# Patient Record
Sex: FEMALE | Race: WHITE | NOT HISPANIC OR LATINO | Employment: OTHER | URBAN - METROPOLITAN AREA
[De-identification: names, ages, dates, MRNs, and addresses within clinical notes are randomized per-mention and may not be internally consistent; named-entity substitution may affect disease eponyms.]

---

## 2017-04-10 ENCOUNTER — GENERIC CONVERSION - ENCOUNTER (OUTPATIENT)
Dept: OTHER | Facility: OTHER | Age: 72
End: 2017-04-10

## 2017-04-19 DIAGNOSIS — I10 ESSENTIAL (PRIMARY) HYPERTENSION: ICD-10-CM

## 2017-04-19 DIAGNOSIS — E03.9 HYPOTHYROIDISM: ICD-10-CM

## 2017-04-19 DIAGNOSIS — R31.9 HEMATURIA: ICD-10-CM

## 2017-04-19 DIAGNOSIS — D47.3 ESSENTIAL HEMORRHAGIC THROMBOCYTHEMIA (HCC): ICD-10-CM

## 2017-04-19 DIAGNOSIS — Z12.11 ENCOUNTER FOR SCREENING FOR MALIGNANT NEOPLASM OF COLON: ICD-10-CM

## 2017-05-02 ENCOUNTER — TRANSCRIBE ORDERS (OUTPATIENT)
Dept: ADMINISTRATIVE | Age: 72
End: 2017-05-02

## 2017-05-02 ENCOUNTER — APPOINTMENT (OUTPATIENT)
Dept: LAB | Age: 72
End: 2017-05-02
Payer: COMMERCIAL

## 2017-05-02 DIAGNOSIS — I10 ESSENTIAL (PRIMARY) HYPERTENSION: ICD-10-CM

## 2017-05-02 DIAGNOSIS — D47.3 ESSENTIAL HEMORRHAGIC THROMBOCYTHEMIA (HCC): ICD-10-CM

## 2017-05-02 DIAGNOSIS — E03.9 HYPOTHYROIDISM: ICD-10-CM

## 2017-05-02 LAB
ALBUMIN SERPL BCP-MCNC: 3.5 G/DL (ref 3.5–5)
ALP SERPL-CCNC: 88 U/L (ref 46–116)
ALT SERPL W P-5'-P-CCNC: 23 U/L (ref 12–78)
ANION GAP SERPL CALCULATED.3IONS-SCNC: 9 MMOL/L (ref 4–13)
AST SERPL W P-5'-P-CCNC: 17 U/L (ref 5–45)
BACTERIA UR QL AUTO: NORMAL /HPF
BASOPHILS # BLD AUTO: 0.09 THOUSANDS/ΜL (ref 0–0.1)
BASOPHILS NFR BLD AUTO: 1 % (ref 0–1)
BILIRUB SERPL-MCNC: 0.62 MG/DL (ref 0.2–1)
BILIRUB UR QL STRIP: NEGATIVE
BUN SERPL-MCNC: 15 MG/DL (ref 5–25)
CALCIUM SERPL-MCNC: 9.7 MG/DL (ref 8.3–10.1)
CHLORIDE SERPL-SCNC: 102 MMOL/L (ref 100–108)
CHOLEST SERPL-MCNC: 182 MG/DL (ref 50–200)
CLARITY UR: CLEAR
CO2 SERPL-SCNC: 26 MMOL/L (ref 21–32)
COLOR UR: YELLOW
CREAT SERPL-MCNC: 0.85 MG/DL (ref 0.6–1.3)
EOSINOPHIL # BLD AUTO: 0.21 THOUSAND/ΜL (ref 0–0.61)
EOSINOPHIL NFR BLD AUTO: 2 % (ref 0–6)
ERYTHROCYTE [DISTWIDTH] IN BLOOD BY AUTOMATED COUNT: 13.9 % (ref 11.6–15.1)
GFR SERPL CREATININE-BSD FRML MDRD: >60 ML/MIN/1.73SQ M
GLUCOSE P FAST SERPL-MCNC: 99 MG/DL (ref 65–99)
GLUCOSE UR STRIP-MCNC: NEGATIVE MG/DL
HCT VFR BLD AUTO: 44.4 % (ref 34.8–46.1)
HDLC SERPL-MCNC: 44 MG/DL (ref 40–60)
HGB BLD-MCNC: 15.1 G/DL (ref 11.5–15.4)
HGB UR QL STRIP.AUTO: ABNORMAL
HYALINE CASTS #/AREA URNS LPF: NORMAL /LPF
KETONES UR STRIP-MCNC: NEGATIVE MG/DL
LDLC SERPL CALC-MCNC: 115 MG/DL (ref 0–100)
LEUKOCYTE ESTERASE UR QL STRIP: NEGATIVE
LYMPHOCYTES # BLD AUTO: 1.82 THOUSANDS/ΜL (ref 0.6–4.47)
LYMPHOCYTES NFR BLD AUTO: 19 % (ref 14–44)
MCH RBC QN AUTO: 30.8 PG (ref 26.8–34.3)
MCHC RBC AUTO-ENTMCNC: 34 G/DL (ref 31.4–37.4)
MCV RBC AUTO: 91 FL (ref 82–98)
MONOCYTES # BLD AUTO: 0.86 THOUSAND/ΜL (ref 0.17–1.22)
MONOCYTES NFR BLD AUTO: 9 % (ref 4–12)
NEUTROPHILS # BLD AUTO: 6.36 THOUSANDS/ΜL (ref 1.85–7.62)
NEUTS SEG NFR BLD AUTO: 69 % (ref 43–75)
NITRITE UR QL STRIP: NEGATIVE
NON-SQ EPI CELLS URNS QL MICRO: NORMAL /HPF
NRBC BLD AUTO-RTO: 0 /100 WBCS
PH UR STRIP.AUTO: 6.5 [PH] (ref 4.5–8)
PLATELET # BLD AUTO: 678 THOUSANDS/UL (ref 149–390)
PMV BLD AUTO: 10 FL (ref 8.9–12.7)
POTASSIUM SERPL-SCNC: 3.3 MMOL/L (ref 3.5–5.3)
PROT SERPL-MCNC: 7.5 G/DL (ref 6.4–8.2)
PROT UR STRIP-MCNC: NEGATIVE MG/DL
RBC # BLD AUTO: 4.9 MILLION/UL (ref 3.81–5.12)
RBC #/AREA URNS AUTO: NORMAL /HPF
SODIUM SERPL-SCNC: 137 MMOL/L (ref 136–145)
SP GR UR STRIP.AUTO: 1.01 (ref 1–1.03)
TRIGL SERPL-MCNC: 114 MG/DL
TSH SERPL DL<=0.05 MIU/L-ACNC: 3.48 UIU/ML (ref 0.36–3.74)
UROBILINOGEN UR QL STRIP.AUTO: 0.2 E.U./DL
WBC # BLD AUTO: 9.37 THOUSAND/UL (ref 4.31–10.16)
WBC #/AREA URNS AUTO: NORMAL /HPF

## 2017-05-02 PROCEDURE — 80053 COMPREHEN METABOLIC PANEL: CPT

## 2017-05-02 PROCEDURE — 84443 ASSAY THYROID STIM HORMONE: CPT

## 2017-05-02 PROCEDURE — 36415 COLL VENOUS BLD VENIPUNCTURE: CPT

## 2017-05-02 PROCEDURE — 81001 URINALYSIS AUTO W/SCOPE: CPT

## 2017-05-02 PROCEDURE — 80061 LIPID PANEL: CPT

## 2017-05-02 PROCEDURE — 85025 COMPLETE CBC W/AUTO DIFF WBC: CPT

## 2017-05-05 ENCOUNTER — ALLSCRIPTS OFFICE VISIT (OUTPATIENT)
Dept: OTHER | Facility: OTHER | Age: 72
End: 2017-05-05

## 2017-05-06 ENCOUNTER — APPOINTMENT (OUTPATIENT)
Dept: LAB | Age: 72
End: 2017-05-06
Payer: COMMERCIAL

## 2017-05-06 ENCOUNTER — TRANSCRIBE ORDERS (OUTPATIENT)
Dept: ADMINISTRATIVE | Age: 72
End: 2017-05-06

## 2017-05-06 DIAGNOSIS — R31.9 HEMATURIA: ICD-10-CM

## 2017-05-06 LAB
BILIRUB UR QL STRIP: NEGATIVE
CLARITY UR: CLEAR
COLOR UR: NORMAL
GLUCOSE UR STRIP-MCNC: NEGATIVE MG/DL
HGB UR QL STRIP.AUTO: NEGATIVE
KETONES UR STRIP-MCNC: NEGATIVE MG/DL
LEUKOCYTE ESTERASE UR QL STRIP: NEGATIVE
NITRITE UR QL STRIP: NEGATIVE
PH UR STRIP.AUTO: 6 [PH] (ref 4.5–8)
PROT UR STRIP-MCNC: NEGATIVE MG/DL
SP GR UR STRIP.AUTO: 1.02 (ref 1–1.03)
UROBILINOGEN UR QL STRIP.AUTO: 0.2 E.U./DL

## 2017-05-06 PROCEDURE — 81003 URINALYSIS AUTO W/O SCOPE: CPT

## 2017-06-14 ENCOUNTER — ALLSCRIPTS OFFICE VISIT (OUTPATIENT)
Dept: OTHER | Facility: OTHER | Age: 72
End: 2017-06-14

## 2017-06-14 DIAGNOSIS — D47.3 ESSENTIAL HEMORRHAGIC THROMBOCYTHEMIA (HCC): ICD-10-CM

## 2017-08-16 ENCOUNTER — APPOINTMENT (OUTPATIENT)
Dept: LAB | Age: 72
End: 2017-08-16
Payer: COMMERCIAL

## 2017-08-16 ENCOUNTER — HOSPITAL ENCOUNTER (OUTPATIENT)
Dept: RADIOLOGY | Age: 72
Discharge: HOME | End: 2017-08-16
Payer: COMMERCIAL

## 2017-08-16 ENCOUNTER — TRANSCRIBE ORDERS (OUTPATIENT)
Dept: ADMINISTRATIVE | Age: 72
End: 2017-08-16

## 2017-08-16 DIAGNOSIS — D47.3 THROMBOCYTHEMIA, ESSENTIAL (HCC): Primary | ICD-10-CM

## 2017-08-16 DIAGNOSIS — D47.3 ESSENTIAL HEMORRHAGIC THROMBOCYTHEMIA (HCC): ICD-10-CM

## 2017-08-16 DIAGNOSIS — D47.3 THROMBOCYTHEMIA, ESSENTIAL (HCC): ICD-10-CM

## 2017-08-16 LAB
BASOPHILS # BLD AUTO: 0.06 THOUSANDS/ΜL (ref 0–0.1)
BASOPHILS NFR BLD AUTO: 1 % (ref 0–1)
EOSINOPHIL # BLD AUTO: 0.41 THOUSAND/ΜL (ref 0–0.61)
EOSINOPHIL NFR BLD AUTO: 5 % (ref 0–6)
ERYTHROCYTE [DISTWIDTH] IN BLOOD BY AUTOMATED COUNT: 14.3 % (ref 11.6–15.1)
HCT VFR BLD AUTO: 46.6 % (ref 34.8–46.1)
HGB BLD-MCNC: 15.9 G/DL (ref 11.5–15.4)
LYMPHOCYTES # BLD AUTO: 1.69 THOUSANDS/ΜL (ref 0.6–4.47)
LYMPHOCYTES NFR BLD AUTO: 21 % (ref 14–44)
MCH RBC QN AUTO: 30.8 PG (ref 26.8–34.3)
MCHC RBC AUTO-ENTMCNC: 34.1 G/DL (ref 31.4–37.4)
MCV RBC AUTO: 90 FL (ref 82–98)
MONOCYTES # BLD AUTO: 0.74 THOUSAND/ΜL (ref 0.17–1.22)
MONOCYTES NFR BLD AUTO: 9 % (ref 4–12)
NEUTROPHILS # BLD AUTO: 5.2 THOUSANDS/ΜL (ref 1.85–7.62)
NEUTS SEG NFR BLD AUTO: 64 % (ref 43–75)
NRBC BLD AUTO-RTO: 0 /100 WBCS
PLATELET # BLD AUTO: 666 THOUSANDS/UL (ref 149–390)
PMV BLD AUTO: 9.9 FL (ref 8.9–12.7)
RBC # BLD AUTO: 5.16 MILLION/UL (ref 3.81–5.12)
WBC # BLD AUTO: 8.12 THOUSAND/UL (ref 4.31–10.16)

## 2017-08-16 PROCEDURE — 36415 COLL VENOUS BLD VENIPUNCTURE: CPT

## 2017-08-16 PROCEDURE — 85025 COMPLETE CBC W/AUTO DIFF WBC: CPT

## 2017-08-16 PROCEDURE — 76700 US EXAM ABDOM COMPLETE: CPT

## 2017-08-16 PROCEDURE — 81270 JAK2 GENE: CPT

## 2017-08-16 PROCEDURE — 86038 ANTINUCLEAR ANTIBODIES: CPT

## 2017-08-16 PROCEDURE — 86430 RHEUMATOID FACTOR TEST QUAL: CPT

## 2017-08-17 LAB — RHEUMATOID FACT SER QL LA: NEGATIVE

## 2017-08-18 LAB — RYE IGE QN: NEGATIVE

## 2017-08-22 LAB — SCAN RESULT: NORMAL

## 2017-08-23 ENCOUNTER — ALLSCRIPTS OFFICE VISIT (OUTPATIENT)
Dept: OTHER | Facility: OTHER | Age: 72
End: 2017-08-23

## 2017-08-23 DIAGNOSIS — D47.3 ESSENTIAL HEMORRHAGIC THROMBOCYTHEMIA (HCC): ICD-10-CM

## 2017-10-02 ENCOUNTER — TRANSCRIBE ORDERS (OUTPATIENT)
Dept: ADMINISTRATIVE | Age: 72
End: 2017-10-02

## 2017-10-02 ENCOUNTER — APPOINTMENT (OUTPATIENT)
Dept: LAB | Age: 72
End: 2017-10-02
Payer: COMMERCIAL

## 2017-10-02 DIAGNOSIS — E03.9 HYPOTHYROIDISM: ICD-10-CM

## 2017-10-02 DIAGNOSIS — I10 ESSENTIAL (PRIMARY) HYPERTENSION: ICD-10-CM

## 2017-10-02 DIAGNOSIS — D47.3 ESSENTIAL HEMORRHAGIC THROMBOCYTHEMIA (HCC): ICD-10-CM

## 2017-10-02 LAB
ALBUMIN SERPL BCP-MCNC: 3.4 G/DL (ref 3.5–5)
ALP SERPL-CCNC: 80 U/L (ref 46–116)
ALT SERPL W P-5'-P-CCNC: 21 U/L (ref 12–78)
ANION GAP SERPL CALCULATED.3IONS-SCNC: 7 MMOL/L (ref 4–13)
AST SERPL W P-5'-P-CCNC: 16 U/L (ref 5–45)
BASOPHILS # BLD AUTO: 0.08 THOUSANDS/ΜL (ref 0–0.1)
BASOPHILS NFR BLD AUTO: 1 % (ref 0–1)
BILIRUB SERPL-MCNC: 0.29 MG/DL (ref 0.2–1)
BUN SERPL-MCNC: 14 MG/DL (ref 5–25)
CALCIUM SERPL-MCNC: 9.7 MG/DL (ref 8.3–10.1)
CHLORIDE SERPL-SCNC: 104 MMOL/L (ref 100–108)
CO2 SERPL-SCNC: 26 MMOL/L (ref 21–32)
CREAT SERPL-MCNC: 0.79 MG/DL (ref 0.6–1.3)
EOSINOPHIL # BLD AUTO: 0.26 THOUSAND/ΜL (ref 0–0.61)
EOSINOPHIL NFR BLD AUTO: 3 % (ref 0–6)
ERYTHROCYTE [DISTWIDTH] IN BLOOD BY AUTOMATED COUNT: 14.2 % (ref 11.6–15.1)
GFR SERPL CREATININE-BSD FRML MDRD: 76 ML/MIN/1.73SQ M
GLUCOSE SERPL-MCNC: 86 MG/DL (ref 65–140)
HCT VFR BLD AUTO: 46.6 % (ref 34.8–46.1)
HGB BLD-MCNC: 15.7 G/DL (ref 11.5–15.4)
LYMPHOCYTES # BLD AUTO: 2.7 THOUSANDS/ΜL (ref 0.6–4.47)
LYMPHOCYTES NFR BLD AUTO: 27 % (ref 14–44)
MCH RBC QN AUTO: 30.6 PG (ref 26.8–34.3)
MCHC RBC AUTO-ENTMCNC: 33.7 G/DL (ref 31.4–37.4)
MCV RBC AUTO: 91 FL (ref 82–98)
MONOCYTES # BLD AUTO: 0.7 THOUSAND/ΜL (ref 0.17–1.22)
MONOCYTES NFR BLD AUTO: 7 % (ref 4–12)
NEUTROPHILS # BLD AUTO: 6.37 THOUSANDS/ΜL (ref 1.85–7.62)
NEUTS SEG NFR BLD AUTO: 62 % (ref 43–75)
NRBC BLD AUTO-RTO: 0 /100 WBCS
PLATELET # BLD AUTO: 649 THOUSANDS/UL (ref 149–390)
PMV BLD AUTO: 9.8 FL (ref 8.9–12.7)
POTASSIUM SERPL-SCNC: 4 MMOL/L (ref 3.5–5.3)
PROT SERPL-MCNC: 7.5 G/DL (ref 6.4–8.2)
RBC # BLD AUTO: 5.13 MILLION/UL (ref 3.81–5.12)
SODIUM SERPL-SCNC: 137 MMOL/L (ref 136–145)
TSH SERPL DL<=0.05 MIU/L-ACNC: 1.02 UIU/ML (ref 0.36–3.74)
WBC # BLD AUTO: 10.14 THOUSAND/UL (ref 4.31–10.16)

## 2017-10-02 PROCEDURE — 84443 ASSAY THYROID STIM HORMONE: CPT

## 2017-10-02 PROCEDURE — 36415 COLL VENOUS BLD VENIPUNCTURE: CPT

## 2017-10-02 PROCEDURE — 85025 COMPLETE CBC W/AUTO DIFF WBC: CPT

## 2017-10-02 PROCEDURE — 80053 COMPREHEN METABOLIC PANEL: CPT

## 2017-10-06 ENCOUNTER — ALLSCRIPTS OFFICE VISIT (OUTPATIENT)
Dept: OTHER | Facility: OTHER | Age: 72
End: 2017-10-06

## 2017-10-06 DIAGNOSIS — R73.03 PREDIABETES: ICD-10-CM

## 2017-10-06 DIAGNOSIS — I10 ESSENTIAL (PRIMARY) HYPERTENSION: ICD-10-CM

## 2017-10-06 DIAGNOSIS — E03.9 HYPOTHYROIDISM: ICD-10-CM

## 2017-10-07 NOTE — PROGRESS NOTES
Assessment  1  Benign essential hypertension (401 1) (I10)   2  Hypothyroidism (244 9) (E03 9)   3  Essential thrombocythemia (238 71) (D47 3)   4  Fatty liver (571 8) (K76 0)    Plan  Benign essential hypertension    · Follow-up visit in 6 months Evaluation and Treatment  AWV + F/U  Status: Hold For - Scheduling   Requested for: 14FHV2592   · (1) COMPREHENSIVE METABOLIC PANEL; Status:Active; Requested for:06Oct2017;    · (1) LIPID PANEL FASTING W DIRECT LDL REFLEX; Status:Active; Requested for:06Oct2017;   Hypothyroidism    · (1) TSH WITH FT4 REFLEX; Status:Active; Requested for:06Oct2017;   Prediabetes    · (1) HEMOGLOBIN A1C; Status:Active; Requested for:06Oct2017;     Discussion/Summary  Discussion Summary:   essential HTN - controlled and stable on amlodipine 5mg daily and triamterene/hctz 37 5/25mg daily  Encouraged weight loss  ET - on observation with ASA per Heme hypothyroidism - TSH WNL  Continue on levothyroxine 125mcg daily  fatty liver - will recheck lipid panel  Discussed likely statin therapy in the future  hypokalemia - secondary to diuretics  On potassium supplement daily  prediabetes - recheck A1c at next visit  Encouraged to lose weight  Medication SE Review and Pt Understands Tx: Possible side effects of new medications were reviewed with the patient/guardian today  The treatment plan was reviewed with the patient/guardian  The patient/guardian understands and agrees with the treatment plan      Chief Complaint  Chief [facility]: Patient is here today for follow up of chronic conditions described in HPI  History of Present Illness  HPI: 71yo female with HTN, hypothyroidism, insomnia, prediabetes, fatty liver, osteopenia and ET here for follow up care  was diagnosed with ET with Jacinto 2 mutation  She gets hot flashes and feels fatigued  Abd u/s negative for splenomegaly but showed fatty liver  She is on ASA 81mg two tabs daily       Hypertension (Follow-Up):  The patient presents for follow-up of essential hypertension  The patient states she has been stable with her blood pressure control since the last visit  She has no comorbid illnesses  She has no significant interval events  Symptoms: The patient is currently asymptomatic  Home monitoring: The patient checks her blood pressure sporadically  Medications: the patient is adherent with her medication regimen -She denies medication side effects  Medication(s): a diuretic-and-a calcium channel blocker  Additional History: Home bp reportedly good  Hypothyroidism (Follow-Up): The patient is being seen for follow-up of hypothyroidism of undetermined etiology  The patient reports doing well  Interval symptoms:  denies weight gain-and-denies fatigue-   The patient presents with complaints of new onset of sweats  Symptoms are resolved  Associated symptoms: no palpitations  Medications include levothyroxine  Medications:  the patient is adherent to her medication regimen, but-she denies medication side effects  Review of Systems  Complete-Female:   Constitutional: feeling tired-and-weight stable, but-no fever  Cardiovascular: No complaints of slow heart rate, no fast heart rate, no chest pain, no palpitations, no leg claudication, no lower extremity edema  Respiratory: No complaints of shortness of breath, no wheezing, no cough, no SOB on exertion, no orthopnea, no PND  Gastrointestinal: No complaints of abdominal pain, no constipation, no nausea or vomiting, no diarrhea, no bloody stools  Genitourinary: No complaints of dysuria, no incontinence, no pelvic pain, no dysmenorrhea, no vaginal discharge or bleeding  Neurological: headache-and-dizziness  Endocrine: hot flashes  Active Problems  1  Benign essential hypertension (401 1) (I10)   2  Breast cancer screening (V76 10) (Z12 39)   3  Encounter for screening for malignant neoplasm of colon (V76 51) (Z12 11)   4   Essential thrombocythemia (238 71) (D47 3)   5  Hypokalemia (276 8) (E87 6)   6  Hypothyroidism (244 9) (E03 9)   7  Impaired fasting glucose (790 21) (R73 01)   8  Insomnia (780 52) (G47 00)   9  Need for immunization against influenza (V04 81) (Z23)   10  Osteopenia (733 90) (M85 80)   11  Prediabetes (790 29) (R73 03)   12  Screening for genitourinary condition (V81 6) (Z13 89)   13  Screening for neurological condition (V80 09) (Z13 89)   14  Thrombocytosis (238 71) (D47 3)    Past Medical History  1  History of biliary colic (D31 18) (O08 68)   2  History of hematuria (V13 09) (Z87 448)   3  History of leukocytosis (V12 3) (Z86 2)   4  History of Toxic nodular goiter (242 30) (E05 20)   5  History of Uterine fibroid (218 9) (D25 9)  Active Problems And Past Medical History Reviewed: The active problems and past medical history were reviewed and updated today  Surgical History  1  History of Cholecystectomy   2  History of Total Abdominal Hysterectomy With Removal Of Both Ovaries   3  History of Tubal Ligation  Surgical History Reviewed: The surgical history was reviewed and updated today  Family History  Mother    1  Family history of hypothyroidism (V18 19) (Z83 49)  Brother    2  Family history of cardiac disorder (V17 49) (Z82 49)  Family History Reviewed: The family history was reviewed and updated today  Social History   · Former smoker (J34 85) (T88 222)   · No alcohol use   · Retired   ·   Social History Reviewed: The social history was reviewed and is unchanged  Current Meds   1  AmLODIPine Besylate 5 MG Oral Tablet; TAKE 1 TABLET DAILY; Therapy: 28AQC9611 to (Evaluate:56Ilo0263)  Requested for: 94Bli2605; Last Rx:89Ixi4607   Ordered   2  Aspirin 81 MG TABS; TAKE 1 TABLET DAILY; Therapy: 34SGY3491 to Recorded   3  Aspirin 81 MG TABS; take 2 tablets daily; Therapy: (Recorded:06Oct2017) to Recorded   4   Klor-Con M20 20 MEQ Oral Tablet Extended Release; TAKE 1 TABLET EVERY DAY;   Therapy: 94JAN8884 to (Evaluate:13Nov2017)  Requested for: 62JFP4628; Last Rx:67Brf6930   Ordered   5  Levothyroxine Sodium 125 MCG Oral Tablet; TAKE 1 TABLET DAILY; Therapy: 63WQV9608 to (Evaluate:17Nov2017)  Requested for: 26Wsf4945; Last Rx:21Xqv1114   Ordered   6  Multivitamins Oral Capsule Recorded   7  Triamterene-HCTZ 37 5-25 MG Oral Tablet; TAKE 1 TABLET DAILY AS DIRECTED; Therapy: 23BQY1227 to (Evaluate:11Jan2018)  Requested for: 64VWA0955; Last Rx:16Jan2017   Ordered  Medication List Reviewed: The medication list was reviewed and updated today  Allergies  1  No Known Drug Allergies    Vitals  Vital Signs    Recorded: 86YVQ3585 09:48AM   Temperature 98 F   Heart Rate 91   Respiration 16   Systolic 346   Diastolic 80   Height 5 ft 3 5 in   Weight 179 lb 2 0 oz   BMI Calculated 31 23   BSA Calculated 1 86   O2 Saturation 94     Physical Exam    Constitutional   General appearance: No acute distress, well appearing and well nourished  Eyes wears glasses  Ears, Nose, Mouth, and Throat   Oropharynx: Normal with no erythema, edema, exudate or lesions  Pulmonary   Respiratory effort: No increased work of breathing or signs of respiratory distress  Auscultation of lungs: Clear to auscultation  Cardiovascular   Auscultation of heart: Normal rate and rhythm, normal S1 and S2, without murmurs  Examination of extremities for edema and/or varicosities: Normal  -post tib 2/4 bilaterally  Abdomen   Abdomen: Non-tender, no masses  Lymphatic   Palpation of lymph nodes in neck: No lymphadenopathy  Neurologic No focal deficit     Psychiatric   Orientation to person, place, and time: Normal     Mood and affect: Normal          Results/Data  (1) COMPREHENSIVE METABOLIC PANEL 97AQY2557 92:33BM Yari ROY Order Number: [id number]     Test Name Result Flag Reference   GLUCOSE,RANDM 86 mg/dL     If the patient is fasting, the ADA then defines impaired fasting glucose as > 100 mg/dL and diabetes as > or equal to 123 mg/dL  Specimen collection should occur prior to Sulfasalazine administration due to the potential for falsely depressed results  Specimen collection should occur prior to Sulfapyridine administration due to the potential for falsely elevated results  SODIUM 137 mmol/L  136-145   POTASSIUM 4 0 mmol/L  3 5-5 3   CHLORIDE 104 mmol/L  100-108   CARBON DIOXIDE 26 mmol/L  21-32   ANION GAP (CALC) 7 mmol/L  4-13   BLOOD UREA NITROGEN 14 mg/dL  5-25   CREATININE 0 79 mg/dL  0 60-1 30   Standardized to IDMS reference method   CALCIUM 9 7 mg/dL  8 3-10 1   BILI, TOTAL 0 29 mg/dL  0 20-1 00   ALK PHOSPHATAS 80 U/L     ALT (SGPT) 21 U/L  12-78   Specimen collection should occur prior to Sulfasalazine and/or Sulfapyridine administration due to the potential for falsely depressed results  AST(SGOT) 16 U/L  5-45   Specimen collection should occur prior to Sulfasalazine administration due to the potential for falsely depressed results  ALBUMIN 3 4 g/dL L 3 5-5 0   TOTAL PROTEIN 7 5 g/dL  6 4-8 2   eGFR 76 ml/min/1 73sq m     [facility] Disease Education Program recommendations are as follows:  GFR calculation is accurate only with a steady state creatinine  Chronic Kidney disease less than 60 ml/min/1 73 sq  meters  Kidney failure less than 15 ml/min/1 73 sq  meters  (1) TSH WITH FT4 REFLEX 02Oct2017 11:42AM Clair Husbands    Order Number: [id number]     Test Name Result Flag Reference   TSH 1 020 uIU/mL  0 358-3 740   Patients undergoing fluorescein dye angiography may retain small amounts of fluorescein in the body for 48-72 hours post procedure  Samples containing fluorescein can produce falsely depressed TSH values  If the patient had this procedure,a specimen should be resubmitted post fluorescein clearance            The recommended reference ranges for TSH during pregnancy are as follows:  First trimester 0 1 to 2 5 uIU/mL  Second trimester  0 2 to 3 0 uIU/mL  Third trimester 0 3 to 3 0 uIU/m     (1) CBC/PLT/DIFF 02Oct2017 11:42AM Tahmina Carrero    Order Number: [id number]     Test Name Result Flag Reference   WBC COUNT 10 14 Thousand/uL  4 31-10 16   RBC COUNT 5 13 Million/uL H 3 81-5 12   HEMOGLOBIN 15 7 g/dL H 11 5-15 4   HEMATOCRIT 46 6 % H 34 8-46  1   MCV 91 fL  82-98   MCH 30 6 pg  26 8-34 3   MCHC 33 7 g/dL  31 4-37 4   RDW 14 2 %  11 6-15 1   MPV 9 8 fL  8 9-12 7   PLATELET COUNT 970 Thousands/uL H 149-390   nRBC AUTOMATED 0 /100 WBCs     NEUTROPHILS RELATIVE PERCENT 62 %  43-75   LYMPHOCYTES RELATIVE PERCENT 27 %  14-44   MONOCYTES RELATIVE PERCENT 7 %  4-12   EOSINOPHILS RELATIVE PERCENT 3 %  0-6   BASOPHILS RELATIVE PERCENT 1 %  0-1   NEUTROPHILS ABSOLUTE COUNT 6 37 Thousands/? ??L  1 85-7 62   LYMPHOCYTES ABSOLUTE COUNT 2 70 Thousands/? ??L  0 60-4 47   MONOCYTES ABSOLUTE COUNT 0 70 Thousand/? ??L  0 17-1 22   EOSINOPHILS ABSOLUTE COUNT 0 26 Thousand/? ??L  0 00-0 61   BASOPHILS ABSOLUTE COUNT 0 08 Thousands/? ??L  0 00-0 10     Health Management  Encounter for screening for malignant neoplasm of colon   COLONOSCOPY; every 1 year; Last 27PIQ6368; Next Due: 39PWX6332; Overdue  Health Maintenance   COLONOSCOPY; every 1 year; Last 31OBN8682; Next Due: 65JRG6727; Overdue  Medicare Annual Wellness Visit; every 1 year; Last 38RKE1323; Next Due: 83BQU0170;  Overdue    Future Appointments    Date/Time Provider Specialty Site   12/20/2017 09:00 AM Yumiko Gee [facility] Hematology [facility]     Signatures   Electronically signed by : Suzan Arriola DO; Oct  6 2017 10:21AM EST                       (Author)

## 2018-01-11 ENCOUNTER — TRANSCRIBE ORDERS (OUTPATIENT)
Dept: ADMINISTRATIVE | Facility: HOSPITAL | Age: 73
End: 2018-01-11

## 2018-01-11 ENCOUNTER — APPOINTMENT (OUTPATIENT)
Dept: LAB | Age: 73
End: 2018-01-11
Payer: COMMERCIAL

## 2018-01-11 DIAGNOSIS — D47.3 ESSENTIAL HEMORRHAGIC THROMBOCYTHEMIA (HCC): ICD-10-CM

## 2018-01-11 LAB
BASOPHILS # BLD AUTO: 0.07 THOUSANDS/ΜL (ref 0–0.1)
BASOPHILS NFR BLD AUTO: 1 % (ref 0–1)
EOSINOPHIL # BLD AUTO: 0.15 THOUSAND/ΜL (ref 0–0.61)
EOSINOPHIL NFR BLD AUTO: 2 % (ref 0–6)
ERYTHROCYTE [DISTWIDTH] IN BLOOD BY AUTOMATED COUNT: 14.1 % (ref 11.6–15.1)
HCT VFR BLD AUTO: 47.1 % (ref 34.8–46.1)
HGB BLD-MCNC: 16.1 G/DL (ref 11.5–15.4)
LYMPHOCYTES # BLD AUTO: 1.99 THOUSANDS/ΜL (ref 0.6–4.47)
LYMPHOCYTES NFR BLD AUTO: 21 % (ref 14–44)
MCH RBC QN AUTO: 31 PG (ref 26.8–34.3)
MCHC RBC AUTO-ENTMCNC: 34.2 G/DL (ref 31.4–37.4)
MCV RBC AUTO: 91 FL (ref 82–98)
MONOCYTES # BLD AUTO: 0.81 THOUSAND/ΜL (ref 0.17–1.22)
MONOCYTES NFR BLD AUTO: 8 % (ref 4–12)
NEUTROPHILS # BLD AUTO: 6.53 THOUSANDS/ΜL (ref 1.85–7.62)
NEUTS SEG NFR BLD AUTO: 68 % (ref 43–75)
NRBC BLD AUTO-RTO: 0 /100 WBCS
PLATELET # BLD AUTO: 732 THOUSANDS/UL (ref 149–390)
PMV BLD AUTO: 9.6 FL (ref 8.9–12.7)
RBC # BLD AUTO: 5.19 MILLION/UL (ref 3.81–5.12)
URATE SERPL-MCNC: 4.6 MG/DL (ref 2–6.8)
WBC # BLD AUTO: 9.59 THOUSAND/UL (ref 4.31–10.16)

## 2018-01-11 PROCEDURE — 36415 COLL VENOUS BLD VENIPUNCTURE: CPT

## 2018-01-11 PROCEDURE — 84550 ASSAY OF BLOOD/URIC ACID: CPT

## 2018-01-11 PROCEDURE — 85025 COMPLETE CBC W/AUTO DIFF WBC: CPT

## 2018-01-11 NOTE — RESULT NOTES
Verified Results  (1) TSH WITH FT4 REFLEX 93DUY3697 10:20AM Cathryne Lent   TW Order Number: [id number]  TW Order Number: [id number]     Test Name Result Flag Reference   TSH 8 330 uIU/mL H 0 358-3 740   The recommended reference ranges for TSH during pregnancy are as follows:  First trimester 0 1 to 2 5 uIU/mL  Second trimester  0 2 to 3 0 uIU/mL  Third trimester 0 3 to 3 0 uIU/m   T4,FREE 1 07 ng/dL  0 76-1 46     (1) CBC/PLT/DIFF 05SWP9524 10:20AM Cathryne Lent   TW Order Number: [id number]  TW Order Number: [id number]     Test Name Result Flag Reference   WBC COUNT 10 13 Thousand/uL  4 31-10 16   RBC COUNT 4 65 Million/uL  3 81-5 12   HEMOGLOBIN 14 5 g/dL  11 5-15 4   HEMATOCRIT 42 8 %  34 8-46  1   MCV 92 fL  82-98   MCH 31 2 pg  26 8-34 3   MCHC 33 9 g/dL  31 4-37 4   RDW 14 4 %  11 6-15 1   MPV 9 9 fL  8 9-12 7   PLATELET COUNT 213 Thousands/uL H 149-390   nRBC AUTOMATED 0 /100 WBCs     NEUTROPHILS RELATIVE PERCENT 69 %  43-75   LYMPHOCYTES RELATIVE PERCENT 20 %  14-44   MONOCYTES RELATIVE PERCENT 9 %  4-12   EOSINOPHILS RELATIVE PERCENT 1 %  0-6   BASOPHILS RELATIVE PERCENT 1 %  0-1   NEUTROPHILS ABSOLUTE COUNT 7 02 Thousands/?L  1 85-7 62   LYMPHOCYTES ABSOLUTE COUNT 2 00 Thousands/?L  0 60-4 47   MONOCYTES ABSOLUTE COUNT 0 89 Thousand/?L  0 17-1 22   EOSINOPHILS ABSOLUTE COUNT 0 14 Thousand/?L  0 00-0 61   BASOPHILS ABSOLUTE COUNT 0 05 Thousands/?L  0 00-0 10       Discussion/Summary   discussed results with patient, now TSH is elevated  Plan to return to levothyroxine 125mcg daily  Has lab slips to repeat TFTs prior to next visit in July

## 2018-01-12 VITALS
WEIGHT: 182 LBS | HEIGHT: 64 IN | TEMPERATURE: 98.7 F | HEART RATE: 98 BPM | DIASTOLIC BLOOD PRESSURE: 80 MMHG | BODY MASS INDEX: 31.07 KG/M2 | OXYGEN SATURATION: 96 % | RESPIRATION RATE: 16 BRPM | SYSTOLIC BLOOD PRESSURE: 130 MMHG

## 2018-01-13 VITALS
HEIGHT: 64 IN | RESPIRATION RATE: 16 BRPM | TEMPERATURE: 98 F | SYSTOLIC BLOOD PRESSURE: 120 MMHG | OXYGEN SATURATION: 94 % | WEIGHT: 179.13 LBS | DIASTOLIC BLOOD PRESSURE: 80 MMHG | BODY MASS INDEX: 30.58 KG/M2 | HEART RATE: 91 BPM

## 2018-01-13 VITALS
HEIGHT: 64 IN | BODY MASS INDEX: 30.22 KG/M2 | SYSTOLIC BLOOD PRESSURE: 122 MMHG | OXYGEN SATURATION: 97 % | HEART RATE: 96 BPM | WEIGHT: 177 LBS | TEMPERATURE: 96.9 F | RESPIRATION RATE: 16 BRPM | DIASTOLIC BLOOD PRESSURE: 80 MMHG

## 2018-01-14 VITALS
OXYGEN SATURATION: 96 % | RESPIRATION RATE: 16 BRPM | DIASTOLIC BLOOD PRESSURE: 80 MMHG | WEIGHT: 182.13 LBS | SYSTOLIC BLOOD PRESSURE: 142 MMHG | BODY MASS INDEX: 31.09 KG/M2 | HEIGHT: 64 IN | TEMPERATURE: 97.3 F | HEART RATE: 110 BPM

## 2018-01-15 NOTE — PROGRESS NOTES
History of Present Illness  Care Coordination Encounter Information:   Type of Encounter: Telephonic   Contact: Initial Contact   Last Office Visit: 1/5/2016   Spoke to Patient   Outreached patient to see how she is doing post OP cholecystectomy from 3/9/2016  She sates she is doing good  taking it easy with the diet  Eating more of a liquid diet right now  Will increase as she feels necessary  Right side of abdomen hurts more than other side  No vomiting  To follow up with Dr Roman Douglas on 3/22/16  Will call his office if she has any questions or concerns about her surgery or incision  She will call primary care office as soon as she gets her mammogram done and takes care of some other appointments  She also knows she has to get blood work before she comes to see Dr Kaia Mcdonald  Active Problems    1  Benign essential hypertension (401 1) (I10)   2  Biliary colic (674 64) (D48 69)   3  Breast cancer screening (V76 10) (Z12 39)   4  Encounter for screening for malignant neoplasm of colon (V76 51) (Z12 11)   5  Hypokalemia (276 8) (E87 6)   6  Hypothyroidism (244 9) (E03 9)   7  Impaired fasting glucose (790 21) (R73 01)   8  Insomnia (780 52) (G47 00)   9  Need for immunization against influenza (V04 81) (Z23)   10  Osteopenia (733 90) (M85 80)   11  Screening for genitourinary condition (V81 6) (Z13 89)   12  Screening for glaucoma (V80 1) (Z13 5)   13  Screening for neurological condition (V80 09) (Z13 89)    Past Medical History    1  History of Toxic nodular goiter (242 30) (E05 20)   2  History of Uterine fibroid (218 9) (D25 9)    Surgical History    1  History of Total Abdominal Hysterectomy With Removal Of Both Ovaries   2  History of Tubal Ligation    Family History    1  Family history of hypothyroidism (V18 19) (Z83 49)    2  Family history of cardiac disorder (V17 49) (Z82 49)    Social History    · Former smoker (Q63 97) (Q18 300)   · No alcohol use   · Retired   ·     Current Meds    1   AmLODIPine Besylate 5 MG Oral Tablet; TAKE 1 TABLET DAILY; Therapy: 19EJR6992 to (Evaluate:13Jun2016)  Requested for: 62YEE7991; Last   Rx:10Lvx2489 Ordered   2  Triamterene-HCTZ 37 5-25 MG Oral Tablet; TAKE 1 TABLET DAILY AS DIRECTED; Therapy: 20XSO5670 to (Jacquiline December)  Requested for: 20-33-70-48; Last   Rx:12Vij3297 Ordered    3  Aspirin 81 MG Oral Tablet; TAKE 1 TABLET DAILY; Therapy: 69QQR3248 to Recorded    4  Klor-Con M20 20 MEQ Oral Tablet Extended Release; TAKE 2 TABLETS DAILY; Therapy: 25XKV0379 to (Evaluate:16Jun2016)  Requested for: 18HTX4759; Last   Rx:57Yzd1600 Ordered    5  Levothyroxine Sodium 125 MCG Oral Tablet; TAKE 1 TABLET DAILY; Therapy: 39NFH5362 to (Jacquiline December)  Requested for: 20-33-70-48; Last   Rx:50Coj6389 Ordered    6  Centrum Oral Tablet; Therapy: 30MXS5725 to Recorded    Allergies    1  No Known Drug Allergies    Health Management   COLONOSCOPY; every 1 year; Last 48ZAL9372; Next Due: 30HCZ4079; Overdue   COLONOSCOPY; every 1 year; Last 33WYB0085; Next Due: 77CWP6772; Overdue  Medicare Annual Wellness Visit; every 1 year; Last 51JCZ2368; Next Due: 14AOE4091; Overdue    End of Encounter Meds    1  AmLODIPine Besylate 5 MG Oral Tablet; TAKE 1 TABLET DAILY; Therapy: 45CJM4452 to (Evaluate:13Jun2016)  Requested for: 84BDS1211; Last   Rx:65Wzp6473 Ordered   2  Triamterene-HCTZ 37 5-25 MG Oral Tablet; TAKE 1 TABLET DAILY AS DIRECTED; Therapy: 37MSC3517 to (Jacquiline December)  Requested for: 20-33-70-48; Last   Rx:83Vlr7347 Ordered    3  Aspirin 81 MG Oral Tablet; TAKE 1 TABLET DAILY; Therapy: 52JUF2110 to Recorded    4  Klor-Con M20 20 MEQ Oral Tablet Extended Release; TAKE 2 TABLETS DAILY; Therapy: 66OFD3664 to (Evaluate:16Jun2016)  Requested for: 38YOU3136; Last   Rx:27Hog9109 Ordered    5  Levothyroxine Sodium 125 MCG Oral Tablet; TAKE 1 TABLET DAILY; Therapy: 12OQV9810 to (Jacquiline December)  Requested for: 20-33-70-48; Last   Rx:28Oct2015 Ordered    6   Centrum Oral Tablet;    Therapy: 94VRW5943 to Recorded    Signatures   Electronically signed by : Rodríguez Larry RN; Mar 11 2016 10:54AM EST                       (Author)

## 2018-01-15 NOTE — MISCELLANEOUS
History of Present Illness  TCM Communication [facility]: The patient is being contacted for [facility]  Hospital records were reviewed  She was hospitalized at and [facility]  The date of admission: 1/21/2016, date of discharge: 1/24/2016  She was discharged to home  She refused a follow up appointment due to Dennie Jabs with waiting until March for next appt  Follow-up appointments with other specialists: Dr Mary Pringle  The patient is currently experiencing symptoms  Some diarhia (Dr Mary Pringle said she will return to hospital in 4-6 weeks to have gall blader removed) Referrals Needed:  None  Counseling was provided to the patient  Communication performed and completed by Romana Miranda   HPI: [id number]      Active Problems    1  Benign essential hypertension (401 1) (I10)   2  Biliary colic (750 44) (X95 80)   3  Breast cancer screening (V76 10) (Z12 39)   4  Encounter for screening for malignant neoplasm of colon (V76 51) (Z12 11)   5  Hypokalemia (276 8) (E87 6)   6  Hypothyroidism (244 9) (E03 9)   7  Impaired fasting glucose (790 21) (R73 01)   8  Insomnia (780 52) (G47 00)   9  Need for immunization against influenza (V04 81) (Z23)   10  Osteopenia (733 90) (M85 80)   11  Screening for genitourinary condition (V81 6) (Z13 89)   12  Screening for glaucoma (V80 1) (Z13 5)   13  Screening for neurological condition (V80 09) (Z13 89)    Past Medical History    1  History of Toxic nodular goiter (242 30) (E05 20)   2  History of Uterine fibroid (218 9) (D25 9)    Surgical History    1  History of Total Abdominal Hysterectomy With Removal Of Both Ovaries   2  History of Tubal Ligation    Family History    1  Family history of hypothyroidism (V18 19) (Z83 49)    2  Family history of cardiac disorder (V17 49) (Z82 49)    Social History    · Former smoker (Q46 84) (M32 420)   · No alcohol use   · Retired   ·     Current Meds   1  AmLODIPine Besylate 5 MG Oral Tablet; TAKE 1 TABLET DAILY;    Therapy: 88LUQ5875 to (Evaluate:13Jun2016)  Requested for: 50DGF9191; Last   Rx:78Nit1857 Ordered   2  Aspirin 81 MG Oral Tablet; TAKE 1 TABLET DAILY; Therapy: 55CAR0730 to Recorded   3  Centrum Oral Tablet; Therapy: 32VRR4631 to Recorded   4  Klor-Con M20 20 MEQ Oral Tablet Extended Release; take 3 tablets x 2 days then one   tablet thereafter; Therapy: 06XDR8706 to (Evaluate:44Huw1188)  Requested for: 37JUI0260; Last   Rx:13Jan2016 Ordered   5  Levothyroxine Sodium 125 MCG Oral Tablet; TAKE 1 TABLET DAILY; Therapy: 07KTB8068 to (Socorro Manzano)  Requested for: 20-33-70-48; Last   Rx:28Oct2015 Ordered   6  Triamterene-HCTZ 37 5-25 MG Oral Tablet; TAKE 1 TABLET DAILY AS DIRECTED; Therapy: 60HDA0762 to (Socorro Manzano)  Requested for: 20-33-70-48; Last   Rx:28Oct2015 Ordered    Allergies    1  No Known Drug Allergies    Health Management  Encounter for screening for malignant neoplasm of colon   COLONOSCOPY; every 1 year; Last 79DMQ8174; Next Due: 75IVU9391; Overdue  Health Maintenance   COLONOSCOPY; every 1 year; Last 75JOK1808; Next Due: 13BGG9980; Overdue  Medicare Annual Wellness Visit; every 1 year; Last 61GLM0251; Next Due: 78WSY6861;  Overdue    Future Appointments    Date/Time Provider Specialty Site   03/09/2016 10:00 AM Faviola Kidd DO Internal Medicine [facility]     Signatures   Electronically signed by : Alfredo Almodovar OM; Jan 25 2016 11:10AM EST                       (Author)    Electronically signed by : Anushka Asencio DO; Jan 26 2016 12:01PM EST                       (Author)

## 2018-01-16 NOTE — RESULT NOTES
Verified Results  * [facility]PAT[facility]ILI[facility] [id number] 02:11PM Vini Cheung     Test Name Result Flag Reference   [facility] HEPAT[facility]ILI[facility] (Report)     HEPATOBILIARY SCAN     INDICATION: Right upper quadrant pain     COMPARISON: None available     TECHNIQUE:  Following the intravenous administration of 5 2 mCi Tc-99m labeled mebrofenin, dynamic abdominal imaging was obtained in the anterior projection over a 60 minute time period  FINDINGS:      There is prompt, uniform accumulation of radiotracer within the liver  There were no regions of photopenia  There is prompt clearance into the biliary tree and small bowel  The gallbladder was not visualized at the end of the 60 minutes of imaging and   therefore additional static images were acquired over a 4 hour timeframe  There is suggestion of faint activity within the expected location of the gallbladder during these delayed images suggesting chronic cholecystitis  Underlying acute component    cannot be excluded  If clinically warranted ultrasound could be performed for further evaluation  Abnormal filling of the gallbladder may suggest a chronic cholecystitis however underlying acute component cannot be completely excluded on this exam  Recommend ultrasound for further evaluation if clinically warranted  ##sigslh##sigslh     (1) COMPREHENSIVE METABOLIC PANEL [id number] 93:12EC Martha, 164Dionne Mejias Kidney Disease Education Program recommendations are as follows:rnGFR calculation is accurate only with a steady state creatininernChronic Kidney disease less than 60 ml/min/1 73 sq  metersrnKidney failure less than 15 ml/min/1 73 sq  meters  Test Name Result Flag Reference   GLUCOSE,RANDM 99 mg/dL     If the patient is fasting, the ADA then defines impaired fasting glucose as > 100 mg/dL and diabetes as > or equal to 123 mg/dL     SODIUM 136 mmol/L  136-145   POTASSIUM 2 8 mmol/L L 3 5-5 3   CHLORIDE 97 mmol/L L 100-108   CARBON DIOXIDE 29 mmol/L 21-32   ANION GAP (CALC) 10 mmol/L  4-13   BLOOD UREA NITROGEN 14 mg/dL  5-25   CREATININE 1 08 mg/dL  0 60-1 30   Standardized to IDMS reference method   CALCIUM 9 5 mg/dL  8 3-10 1   BILI, TOTAL 0 46 mg/dL  0 20-1 00   ALK PHOSPHATAS 84 U/L     ALT (SGPT) 25 U/L  12-78   AST(SGOT) 15 U/L  5-45   ALBUMIN 3 6 g/dL  3 5-5 0   TOTAL PROTEIN 7 6 g/dL  6 4-8 2   eGFR Non-African American 50 5 ml/min/1 73sq m         Plan  Biliary colic    · 2 - Carlos CARABALLO, Tony Cotter  (General Surgery) Physician Referral  Consult  Status: Hold For -  Scheduling  Requested for: [id number]  Care Summary provided  : Yes  Hypokalemia    · Klor-Con M20 20 MEQ Oral Tablet Extended Release; take 3 tablets x 2 days then  one tablet thereafter   · (1) MAGNESIUM; Status:Active; Requested GEB:[id number];    · (1) POTASSIUM; Status:Active; Requested [id number];     Discussion/Summary   Discussed results with patient  Has hypokalemia, potassium of 2 8  She feels fatigued  No chest pain  Advised holding triamterene/hctz x2 days, will send rx to her pharmacy for potassium 60meq x2 days  She will restart triamterene/hctz after two days along with maintenance potassium of 20meq daily  She will check potassium and magnesium level in 3 days  Biliary colic - HIDA scan with possible chronic cholecystitis  Will refer to general surgery for further evaluation       Signatures   Electronically signed by : Luz Marina Willams DO; 2016  3:15PM EST                       (Author)

## 2018-01-18 NOTE — RESULT NOTES
Verified Results  (1) CBC/ PLT (NO DIFF) [id number] 09:43AM [facility]     Test Name Result Flag Reference   HEMATOCRIT 42 9 %  34 8-46 1   HEMOGLOBIN 14 5 g/dL  11 5-15 4   MCHC 33 8 g/dL  31 4-37 4   MCH 30 7 pg  26 8-34 3   MCV 91 fL  82-98   PLATELET COUNT 466 Thousands/uL H 149-390   RBC COUNT 4 72 Million/uL  3 81-5 12   RDW 13 8 %  11 6-15 1   WBC COUNT 11 55 Thousand/uL H 4 31-10 16   MPV 9 7 fL  8 9-12 7     (1) LIPID PANEL FASTING W DIRECT LDL REFLEX [id number] 09:43AM [facility]   Triglyceride:         Normal              <150 mg/dl       Borderline High    150-199 mg/dl       High               200-499 mg/dl       Very High          >499 mg/dl  Cholesterol:         Desirable        <200 mg/dl      Borderline High  200-239 mg/dl      High             >239 mg/dl  HDL Cholesterol:        High    >59 mg/dL      Low     <41 mg/dL  LDL Cholesterol:        Optimal          <100 mg/dl         Near Optimal     100-129 mg/dl        Above Optimal          Borderline High   130-159 mg/dl          High              160-189 mg/dl          Very High        >189 mg/dl  LDL CALCULATED:    This screening LDL is a calculated result  It does not have the accuracy of the Direct Measured LDL in the monitoring of patients with hyperlipidemia and/or statin therapy  Direct Measure LDL ([id number]) must be ordered separately in these patients  Test Name Result Flag Reference   CHOLESTEROL 200 mg/dL     LDL CHOLESTEROL CALCULATED 131 mg/dL H 0-100   TRIGLYCERIDES 114 mg/dL  <=150   Specimen collection should occur prior to N-Acetylcysteine or Metamizole administration due to the potential for falsely depressed results  HDL,DIRECT 46 mg/dL  40-60     (1) COMPREHENSIVE METABOLIC PANEL [id number] 82:28GR Martha, [place] Mejias Kidney Disease Education Program recommendations are as follows:  GFR calculation is accurate only with a steady state creatinine  Chronic Kidney disease less than 60 ml/min/1 73 sq  meters  Kidney failure less than 15 ml/min/1 73 sq  meters  Test Name Result Flag Reference   GLUCOSE,RANDM 95 mg/dL     If the patient is fasting, the ADA then defines impaired fasting glucose as > 100 mg/dL and diabetes as > or equal to 123 mg/dL  SODIUM 138 mmol/L  136-145   POTASSIUM 4 1 mmol/L  3 5-5 3   CHLORIDE 105 mmol/L  100-108   CARBON DIOXIDE 27 mmol/L  21-32   ANION GAP (CALC) 6 mmol/L  4-13   BLOOD UREA NITROGEN 16 mg/dL  5-25   CREATININE 0 68 mg/dL  0 60-1 30   Standardized to IDMS reference method   CALCIUM 9 7 mg/dL  8 3-10 1   BILI, TOTAL 0 35 mg/dL  0 20-1 00   ALK PHOSPHATAS 91 U/L     ALT (SGPT) 32 U/L  12-78   AST(SGOT) 18 U/L  5-45   ALBUMIN 3 6 g/dL  3 5-5 0   TOTAL PROTEIN 7 1 g/dL  6 4-8 2   eGFR Non-African American      >60 0 ml/min/1 73sq m     (1) TSH WITH FT4 REFLEX [id number] 09:43AM [facility]   Patients undergoing fluorescein dye angiography may retain small amounts of fluorescein in the body for 48-72 hours post procedure  Samples containing fluorescein can produce falsely depressed TSH values  If the patient had this procedure,a specimen should be resubmitted post fluorescein clearance          The recommended reference ranges for TSH during pregnancy are as follows:  First trimester 0 1 to 2 5 uIU/mL  Second trimester  0 2 to 3 0 uIU/mL  Third trimester 0 3 to 3 0 uIU/m     Test Name Result Flag Reference   TSH 0 210 uIU/mL L 0 358-3 740     (1) TSH WITH FT4 REFLEX [id number] 09:43AM [facility]     Test Name Result Flag Reference   T4,FREE 1 48 ng/dL H 0 76-1 46     (1) URINALYSIS w URINE C/S REFLEX (will reflex a microscopy if leukocytes, occult blood, or nitrites are not within normal limits) [id number] 09:43AM [facility]     Test Name Result Flag Reference   COLOR Yellow     CLARITY Clear     PH UA 7 0  4 5-8 0   LEUKOCYTE ESTERASE UA Negative  Negative   NITRITE UA Negative  Negative   PROTEIN UA Negative mg/dl  Negative   GLUCOSE UA Negative mg/dl Negative   KETONES UA Negative mg/dl  Negative   UROBILINOGEN UA 0 2 E U /dl  0 2, 1 0 E U /dl   BILIRUBIN UA Negative  Negative   BLOOD UA Negative  Negative   SPECIFIC GRAVITY UA 1 006  1 003-1 030       Discussion/Summary   will discuss at upcoming appointment 4/4

## 2018-02-05 NOTE — PROGRESS NOTES
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 manifested with thrombocythemia, increased hemoglobin  She is on aspirin 81 mg by mouth twice a day  Platelet count has been 500- 600,000 range  Anagrelide or hydroxyurea or Ruxolitinib not initiated for platelet count in this range  She has no B symptoms  She agreed to an observational study  No need for phlebotomy unless hematocrit above 45 persistently  Reviewed that both HCT and platelets are increasing  Still under 48% and 800,000 respectively  We did discuss that she is likely to see Hydrea in the future  Literature given from [employer]    At this time, will continue with observation and re-evaluate in 3 months  Stressed importance of her aspirin therapy and she verbalizes she is compliant with daily use  Patient seen and treatment plan made with Dr Bobby Reyes  HPI:  81-year-old  female who was seen initially 6/2017 regarding persistent thrombocytosis going back to at least March 2015  May 2017 WBC 9 3, hemoglobin 15 1, MCV 91, it gets 678,000, 69% neutrophils, 19% lymphocytes, 9% monocytes, 2% eosinophilsNovember 2016 WBC 9 6, hemoglobin 15 1, platelets 928,428  June 2016 platelets 061,675  March 2016 platelets 223,997  March 2015 platelets 748,921  She has a history of high blood pressure, hypothyroidism,  cholecystectomy in March 2016  She is a smoker - used to smoke half pack of cigarettes every day for 40 years quit in 2014  She does not drink alcohol  History negative for blood disorders of cancer  FADIA, rheumatoid factor normal   Ultrasound of the abdomen did not identify hepatosplenomegaly  DEAN 2 mutation present  Interval History:  She reports she is feeling well  No complaints           Test Results:    Imaging: No results found  Labs:   Lab Results   Component Value Date    WBC 9 59 01/11/2018    HGB 16 1 (H) 01/11/2018    HCT 47 1 (H) 01/11/2018    MCV 91 01/11/2018     (H) 01/11/2018     Lab Results   Component Value Date     10/02/2017    K 4 0 10/02/2017     10/02/2017    CO2 26 10/02/2017    ANIONGAP 7 10/02/2017    BUN 14 10/02/2017    CREATININE 0 79 10/02/2017    GLUCOSE 86 10/02/2017    GLUF 99 05/02/2017    CALCIUM 9 7 10/02/2017    AST 16 10/02/2017    ALT 21 10/02/2017    ALKPHOS 80 10/02/2017    PROT 7 5 10/02/2017    BILITOT 0 29 10/02/2017    EGFR 76 10/02/2017       No results found for: IRON, TIBC, FERRITIN    No results found for: [id number]      ROS:   Review of Systems   Constitutional: Negative for activity change, appetite change, chills, diaphoresis, fatigue, fever and unexpected weight change  HENT: Negative for congestion, dental problem, ear pain, facial swelling, hearing loss, mouth sores, nosebleeds, rhinorrhea, trouble swallowing and voice change  Eyes: Negative for photophobia, pain, discharge, redness, itching and visual disturbance  Respiratory: Negative for cough, chest tightness, shortness of breath, wheezing and stridor  Cardiovascular: Negative for chest pain and palpitations  Gastrointestinal: Negative for abdominal distention, abdominal pain, anal bleeding, blood in stool, constipation, diarrhea, nausea, rectal pain and vomiting  Endocrine: Negative for cold intolerance and heat intolerance  Genitourinary: Negative for decreased urine volume, difficulty urinating, dysuria, flank pain, frequency, hematuria and urgency  Musculoskeletal: Negative for arthralgias, back pain, gait problem, joint swelling, myalgias, neck pain and neck stiffness  Skin: Negative for color change, pallor, rash and wound  Neurological: Negative for dizziness, tremors, seizures, syncope, weakness, light-headedness, numbness and headaches  Hematological: Negative for adenopathy  Does not bruise/bleed easily  Psychiatric/Behavioral: Negative for agitation, confusion, decreased concentration and sleep disturbance  The patient is not nervous/anxious  Current Medications: Reviewed  Allergies: Reviewed  PMH/FH/SH:  Reviewed      Physical Exam:    There is no height or weight on file to calculate BSA  Wt Readings from Last 3 Encounters:   10/06/17 81 3 kg (179 lb 2 1 oz)   08/23/17 80 3 kg (177 lb)   06/14/17 82 6 kg (182 lb 2 1 oz)        Temp Readings from Last 3 Encounters:   10/06/17 98 °F (36 7 °C)   08/23/17 (!) 96 9 °F (36 1 °C)   06/14/17 (!) 97 3 °F (36 3 °C)        BP Readings from Last 3 Encounters:   10/06/17 120/80   08/23/17 122/80   06/14/17 142/80           Physical Exam   Constitutional: She is oriented to person, place, and time  She appears well-developed and well-nourished  No distress  HENT:   Head: Normocephalic and atraumatic  Nose: Nose normal    Mouth/Throat: Oropharynx is clear and moist    Eyes: Conjunctivae and EOM are normal  Pupils are equal, round, and reactive to light  No scleral icterus  Neck: Normal range of motion  Neck supple  No tracheal deviation present  No thyromegaly present  Cardiovascular: Normal rate, regular rhythm, normal heart sounds and intact distal pulses  Exam reveals no gallop and no friction rub  No murmur heard  Pulmonary/Chest: Effort normal and breath sounds normal  No stridor  No respiratory distress  She has no wheezes  She has no rales  She exhibits no tenderness  Abdominal: Soft  Bowel sounds are normal  She exhibits no distension and no mass  There is no tenderness  There is no rebound and no guarding  Musculoskeletal: Normal range of motion  She exhibits no edema, tenderness or deformity  Lymphadenopathy:     She has no cervical adenopathy  Neurological: She is alert and oriented to person, place, and time  No cranial nerve deficit   Coordination normal  Skin: Skin is warm and dry  No rash noted  She is not diaphoretic  No erythema  No pallor  Psychiatric: She has a normal mood and affect  Her behavior is normal  Judgment and thought content normal            Goals and Barriers:  Current Goal:  Remain free from falls  Barriers: None  Patient's Capacity to Self Care:  Patient is able to self care

## 2018-02-06 ENCOUNTER — OFFICE VISIT (OUTPATIENT)
Dept: HEMATOLOGY ONCOLOGY | Facility: CLINIC | Age: 73
End: 2018-02-06
Payer: COMMERCIAL

## 2018-02-06 VITALS
RESPIRATION RATE: 14 BRPM | SYSTOLIC BLOOD PRESSURE: 142 MMHG | WEIGHT: 177 LBS | HEART RATE: 86 BPM | DIASTOLIC BLOOD PRESSURE: 88 MMHG | OXYGEN SATURATION: 98 % | BODY MASS INDEX: 31.36 KG/M2 | TEMPERATURE: 97 F | HEIGHT: 63 IN

## 2018-02-06 DIAGNOSIS — D47.1 MYELOPROLIFERATIVE DISEASE (HCC): Primary | ICD-10-CM

## 2018-02-06 PROCEDURE — 99215 OFFICE O/P EST HI 40 MIN: CPT | Performed by: PHYSICIAN ASSISTANT

## 2018-02-12 DIAGNOSIS — I10 BENIGN ESSENTIAL HYPERTENSION: Primary | ICD-10-CM

## 2018-02-12 RX ORDER — TRIAMTERENE AND HYDROCHLOROTHIAZIDE 37.5; 25 MG/1; MG/1
1 CAPSULE ORAL EVERY MORNING
Qty: 90 CAPSULE | Refills: 1 | Status: SHIPPED | OUTPATIENT
Start: 2018-02-12 | End: 2018-08-27 | Stop reason: SDUPTHER

## 2018-03-12 DIAGNOSIS — I10 BENIGN ESSENTIAL HYPERTENSION: Primary | ICD-10-CM

## 2018-03-12 RX ORDER — AMLODIPINE BESYLATE AND BENAZEPRIL HYDROCHLORIDE 5; 10 MG/1; MG/1
1 CAPSULE ORAL DAILY
Qty: 90 CAPSULE | Refills: 3 | Status: SHIPPED | OUTPATIENT
Start: 2018-03-12 | End: 2018-04-13 | Stop reason: CLARIF

## 2018-03-14 DIAGNOSIS — I10 BENIGN ESSENTIAL HYPERTENSION: ICD-10-CM

## 2018-03-14 DIAGNOSIS — I10 BENIGN HYPERTENSION: Primary | ICD-10-CM

## 2018-03-14 RX ORDER — AMLODIPINE BESYLATE 5 MG/1
5 TABLET ORAL DAILY
Qty: 90 TABLET | Refills: 1 | Status: SHIPPED | OUTPATIENT
Start: 2018-03-14 | End: 2018-09-20 | Stop reason: SDUPTHER

## 2018-04-10 ENCOUNTER — APPOINTMENT (OUTPATIENT)
Dept: LAB | Age: 73
End: 2018-04-10
Payer: COMMERCIAL

## 2018-04-10 DIAGNOSIS — E03.9 HYPOTHYROIDISM: ICD-10-CM

## 2018-04-10 DIAGNOSIS — I10 ESSENTIAL (PRIMARY) HYPERTENSION: ICD-10-CM

## 2018-04-10 DIAGNOSIS — R73.03 PREDIABETES: ICD-10-CM

## 2018-04-10 LAB
ALBUMIN SERPL BCP-MCNC: 3.6 G/DL (ref 3.5–5)
ALP SERPL-CCNC: 93 U/L (ref 46–116)
ALT SERPL W P-5'-P-CCNC: 19 U/L (ref 12–78)
ANION GAP SERPL CALCULATED.3IONS-SCNC: 6 MMOL/L (ref 4–13)
AST SERPL W P-5'-P-CCNC: 18 U/L (ref 5–45)
BILIRUB SERPL-MCNC: 0.45 MG/DL (ref 0.2–1)
BUN SERPL-MCNC: 15 MG/DL (ref 5–25)
CALCIUM SERPL-MCNC: 9 MG/DL
CHLORIDE SERPL-SCNC: 103 MMOL/L (ref 100–108)
CHOLEST SERPL-MCNC: 159 MG/DL (ref 50–200)
CO2 SERPL-SCNC: 28 MMOL/L (ref 21–32)
CREAT SERPL-MCNC: 0.85 MG/DL (ref 0.6–1.3)
EST. AVERAGE GLUCOSE BLD GHB EST-MCNC: 117 MG/DL
GFR SERPL CREATININE-BSD FRML MDRD: 70 ML/MIN/1.73SQ M
GLUCOSE P FAST SERPL-MCNC: 87 MG/DL (ref 65–99)
HBA1C MFR BLD: 5.7 % (ref 4.2–6.3)
HDLC SERPL-MCNC: 50 MG/DL (ref 40–60)
LDLC SERPL CALC-MCNC: 96 MG/DL (ref 0–100)
POTASSIUM SERPL-SCNC: 3.3 MMOL/L (ref 3.5–5.3)
PROT SERPL-MCNC: 7.7 G/DL (ref 6.4–8.2)
SODIUM SERPL-SCNC: 137 MMOL/L (ref 136–145)
TRIGL SERPL-MCNC: 63 MG/DL
TSH SERPL DL<=0.05 MIU/L-ACNC: 1.05 UIU/ML (ref 0.36–3.74)

## 2018-04-10 PROCEDURE — 83036 HEMOGLOBIN GLYCOSYLATED A1C: CPT

## 2018-04-10 PROCEDURE — 80053 COMPREHEN METABOLIC PANEL: CPT

## 2018-04-10 PROCEDURE — 36415 COLL VENOUS BLD VENIPUNCTURE: CPT

## 2018-04-10 PROCEDURE — 80061 LIPID PANEL: CPT

## 2018-04-10 PROCEDURE — 84443 ASSAY THYROID STIM HORMONE: CPT

## 2018-04-13 ENCOUNTER — OFFICE VISIT (OUTPATIENT)
Dept: INTERNAL MEDICINE CLINIC | Facility: CLINIC | Age: 73
End: 2018-04-13
Payer: COMMERCIAL

## 2018-04-13 VITALS
OXYGEN SATURATION: 98 % | HEART RATE: 96 BPM | TEMPERATURE: 98.4 F | RESPIRATION RATE: 16 BRPM | SYSTOLIC BLOOD PRESSURE: 130 MMHG | DIASTOLIC BLOOD PRESSURE: 84 MMHG | BODY MASS INDEX: 32.11 KG/M2 | HEIGHT: 63 IN | WEIGHT: 181.2 LBS

## 2018-04-13 DIAGNOSIS — E87.6 HYPOKALEMIA: ICD-10-CM

## 2018-04-13 DIAGNOSIS — R73.03 PREDIABETES: ICD-10-CM

## 2018-04-13 DIAGNOSIS — E03.9 ACQUIRED HYPOTHYROIDISM: ICD-10-CM

## 2018-04-13 DIAGNOSIS — I10 BENIGN ESSENTIAL HYPERTENSION: Primary | ICD-10-CM

## 2018-04-13 DIAGNOSIS — Z12.39 SCREENING FOR MALIGNANT NEOPLASM OF BREAST: ICD-10-CM

## 2018-04-13 DIAGNOSIS — D47.3 ESSENTIAL THROMBOCYTHEMIA (HCC): ICD-10-CM

## 2018-04-13 DIAGNOSIS — Z00.00 MEDICARE ANNUAL WELLNESS VISIT, SUBSEQUENT: ICD-10-CM

## 2018-04-13 DIAGNOSIS — Z11.59 NEED FOR HEPATITIS C SCREENING TEST: ICD-10-CM

## 2018-04-13 PROBLEM — K76.0 FATTY LIVER: Status: ACTIVE | Noted: 2017-10-06

## 2018-04-13 PROCEDURE — G0439 PPPS, SUBSEQ VISIT: HCPCS | Performed by: INTERNAL MEDICINE

## 2018-04-13 PROCEDURE — 3725F SCREEN DEPRESSION PERFORMED: CPT | Performed by: INTERNAL MEDICINE

## 2018-04-13 PROCEDURE — 99214 OFFICE O/P EST MOD 30 MIN: CPT | Performed by: INTERNAL MEDICINE

## 2018-04-13 PROCEDURE — 1101F PT FALLS ASSESS-DOCD LE1/YR: CPT | Performed by: INTERNAL MEDICINE

## 2018-04-13 NOTE — PROGRESS NOTES
HPI:  Mathew Valentine is a 81 y o  female here for her Subsequent Wellness Visit  Patient Active Problem List   Diagnosis    Hypokalemia    [facility] [address] [facility]    Benign essential hypertension    Essential thrombocythemia ([facility] [address] [facility]    Fatty liver    Hypothyroidism    Impaired fasting glucose    Insomnia    Osteopenia    Prediabetes     Past Medical History:   Diagnosis Date    Biliary colic     last assessed: 01/05/16    Cholecystitis     Disease of thyroid gland     hypo    Hematuria     last assessed: 05/05/17    Hypertension     Leukocytosis     last assessed: 04/05/16    Toxic nodular goiter     Uterine fibroid      Past Surgical History:   Procedure Laterality Date    HYSTERECTOMY      INGRIS, with removal of both ovaries    NE LAP,CHOLECYSTECTOMY N/A 3/9/2016    Procedure: LAPAROSCOPIC CHOLECYSTECTOMY ;  Surgeon: Johanny Fernandez MD;  Location: [facility];  Service: General    TUBAL LIGATION       Family History   Problem Relation Age of Onset    Hypothyroidism Mother     Heart disease Brother      Cardiac disorder     History   Smoking Status    Former Smoker    Packs/day: 0 50    Years: 40 00    Quit date: 1/1/2012   Smokeless Tobacco    Never Used     History   Alcohol Use No      History   Drug Use No     /84 (BP Location: Left arm, Patient Position: Sitting)   Pulse 96   Temp 98 4 °F (36 9 °C)   Resp 16   Ht 5' 3" (1 6 m)   Wt 82 2 kg (181 lb 3 2 oz)   SpO2 98%   BMI 32 10 kg/m²       Current Outpatient Prescriptions   Medication Sig Dispense Refill    amLODIPine (NORVASC) 5 mg tablet Take 1 tablet (5 mg total) by mouth daily 90 tablet 1    aspirin 81 MG tablet Take 81 mg by mouth daily   levothyroxine 125 mcg tablet Take 125 mcg by mouth daily   Multiple Vitamin (MULTIVITAMIN) capsule Take 1 capsule by mouth daily   potassium chloride (K-DUR) 10 mEq tablet Take 20 mEq by mouth daily Indications: High Blood Pressure        triamterene-hydrochlorothiazide (DYAZIDE) 37 5-25 mg per capsule Take 1 capsule by mouth every morning 90 capsule 1     No current facility-administered medications for this visit        No Known Allergies  Immunization History   Administered Date(s) Administered    Influenza Split High Dose Preservative Free IM 12/11/2013, 11/03/2014, 09/09/2015, 11/10/2016, 10/06/2017    Influenza TIV (IM) 11/07/2012    Pneumococcal Conjugate 13-Valent 11/03/2014    Pneumococcal Polysaccharide PPV23 11/07/2012       Patient Care Team:  Alvarez Martel DO as PCP - General  Ramírez Magana MD      Medicare Screening Tests and Risk Assessments:  AWV Clinical     ISAR:   Previous hospitalizations?:  No       Once in a Lifetime Medicare Screening:   EKG performed?:  No    AAA screening performed? (if performed, please add date to Health Maintenance):  No       Medicare Screening Tests and Risk Assessment:   AAA Risk Assessment    None Indicated:  Yes    Osteoporosis Risk Assessment     Female:  Yes   :  Yes    Age over 48:  Yes    Tobacco use:  No Alcohol use:  No   HIV Risk Assessment    None indicated:  Yes        Drug and Alcohol Use:   Tobacco use    Cigarettes:  former smoker    Smokeless:  never used smokeless tobacco    Tobacco use duration    Tobacco Cessation Readiness    Alcohol use    Alcohol use:  never    Alcohol Treatment Readiness   Illicit Drug Use    Drug use:  never        Diet & Exercise:   Diet   What is your diet?:  Regular   How many servings a day of the following:   Fruits and Vegetables:  1-2 Meat:  1-2   Whole Grains:  1 Simple Carbs:  0   Dairy:  2 Soda:  0   Coffee:  3 Tea:  0   Exercise    Do you currently exercise?:  currently not exercising       Cognitive Impairment Screening:   Depression screening preformed:  Yes Depression screen score:  0    PHQ-9 Depression scale score:  0   Cognitive Impairment Screening    Do you have difficulty learning or retaining new information?:  No Do you have difficulty handling new tasks?:  No   Do you have difficulty with reasoning?:  No Do you have difficulty with spatial ability and orientation?:  No   Do you have difficulty with language?:  No Do you have difficulty with behavior?:  No       Functional Ability/Level of Safety:   Hearing    Hearing difficulties:  No    Hearing Impairment Assessment    Hearing status:  No impairment   Do your family members ever complain that you turn on the radio or T V  too loudly?:  No Do you find that other people have to repeat themselves when talking to you?:  No   Do you have difficulty hearing while talking on the phone?:  No Has anyone ever told you that you are speaking too loudly when talking with them?:  No   Do you have trouble hearing the doorbell or phone ringing?:  No Do you have difficulty hearing such that you feel frustrated talking to people?:  No   Current Activities    Status:  unlimited ADL's, unlimited driving, unlimited IADL's, unlimited social activities   Help needed with the folllowing:    Using the phone:  No Transportation:  No   Shopping:  No Preparing Meals:  No   Doing Housework:  No Doing Laundry:  No   Managing Medications:  No Managing Money:  No   ADL    Feeding:  Independant   Oral hygiene and Facial grooming:  Independant   Bathing:  Independant   Upper Body Dressing:  Independant   Lower Body Dressing:  Independant   Toileting:  Independant   Bed Mobility:  Independant   Fall Risk   Have you fallen in the last 12 months?:  No Are you unsteady on your feet?:  No   How many times?:  0 Are you taking any medications that may cause fatigue or dizziness?:  No    Do you rush to the bathroom potentially risking a fall?:  No   Injury History       Home Safety:   Are there hazards in your environment?:  No   If you fell, would you need help to get back up from the ground?:  No Do you have problems or concerns getting in/out of a bed, chair, tub, or toilet?:  No   Do you feel unsteady when walking?:  No Is your activity limited by pain?:  No   Do you have handrails and grab-bars in the home?:  No Are emergency numbers kept by the phone and regularly updated?:  Yes   Are you and/or family members aware of the dangers of smoking in bed?:  Yes    Do you have working smoke alarms and fire extinguisher?:  Yes Do all household members know how to use them?:  Yes   Have you left the stove on unsupervised?:  No    Home Safety Risk Factors   Unfamilar with surroundings:  No Uneven floors:  No   Stairs or handrail saftey risk:  No Loose rugs:  No   Household clutter:  No Poor household lighting:  No   No grab bars in bathroom:  No Further evaluation needed:  No       Advanced Directives:   Advanced Directives    Living Will:  No Durable POA for healthcare:  Yes   Patient's End of Life Decisions    End of life decisions comments:  Medical POADeanne       Urinary Incontinence:   Do you have urinary incontinence?:  No Do you have incomplete emptying?:  No   Do you urinate frequently?:  No Do you have urinary urgency?:  No   Do you have urinary hesitancy?:  No Do you have dysuria (painful and/or difficult urination)?:  No   Do you have nocturia (waking up to urinate)?:  No Do you strain when urinating (have to push to urinate)?:  No   Do you have a weak stream when urinating?:  No Do you have intermittent streaming when urinating?:  No   Do you dribble urine after finishing?:  Yes    Do you have vaginal pressure?:  No Do you have vaginal dryness?:  No       Glaucoma:            Provider Screening     Preventative Screening/Counseling:   Cardiovascular Screening/Counseling:   (Labs Q5 years, EKG optional one-time)   General:  Risks and Benefits Discussed, Screening Current           Diabetes Screening/Counseling:   (2 tests/year if Pre-Diabetes or 1 test/year if no Diabetes)   General:  Risks and Benefits Discussed, Screening Current           Colorectal Cancer Screening/Counseling:   (FOBT Q1 yr; Flex Sig Q4 yrs or Q10 yrs after Screening Colonoscopy; Screening Colonoscpy Q2 yrs High Risk or Q10 yrs Low Risk; Barium Enema Q2 yrs High Risk or Q4 yrs Low Risk)   General:  Risks and Benefits Discussed, Screening Current           Prostate Cancer Screening/Counseling:   (Annual)          Breast Cancer Screening/Counseling:   (Baseline Age 28 - 43; Annual Age 36+)   General:  Risks and Benefits Discussed Due for: Studies:  mammogram         Cervical Cancer Screening/Counseling:   (Annual for High Risk or Childbearing Age with Abnormal Pap in Last 3 yrs; Every 2 all others)   General:  Risks and Benefits Discussed, Patient Declines           Osteoporosis Screening/Counseling:   (Every 2 Yrs if at risk or more if medically necessary)   General:  Risks and Benefits Discussed, Patient Declines           AAA Screening/Counseling:   (Once per Lifetime with risk factors)    General:  Screening Not Indicated           Glaucoma Screening/Counseling:   (Annual)   General:  Risks and Benefits Discussed Due for: Referrals:  referral to ophthalmology         HIV Screening/Counseling:   (Voluntary; Once annually for high risk OR 3 times for Pregnancy at diagnosis of IUP; 3rd trimester; and at Labor   General:  Screening Not Indicated           Hepatitis C Screening:   Hepatitis C Counseling Provided:  Yes Hepatitis C Screening Accepted: Yes              Immunizations:   Influenza (annual):   Influenza UTD This Year, Influenza Recommended Annually   Pneumococcal (Once in a Lifetime):  Lifetime Vaccine Completed   Hepatitis B Series (low risk patients):  Series Not Indicated   Zostavax (Medicare D Coverage, Pt >70 yo):  Risks & Benefits Discussed, Patient Declines   TD (Non-Medicare Wellness  Visit required):  Risks & Benefits Discussed, Patient Declines   Tdap (Non-Medicare Wellness Visit required):  Risks & Benefits Discussed, Patient Declines       Other Preventative Couseling (Non-Medicare Wellness Visit Required):   nutrition counseling performed, fall prevention education provided, weight reduction was discussed, Increased physical activity counseling given       Referrals (Non-Medicare Wellness Visit Required):       Medical Equipment/Suppliers:   none today           No exam data present  Reviewed Updated [facility] Prior Wellness Visits:   Last Medicare wellness visit information was reviewed, patient interviewed , no change since last AWVyes  Last Medicare wellness visit information was reviewed, patient interviewed and updates made to the record today yes    Assessment and Plan:  1  Medicare annual wellness visit, subsequent     2  Need for hepatitis C screening test  Hepatitis C antibody   3   Screening for malignant neoplasm of breast  Mammo screening bilateral w cad       Health Maintenance Due   Topic Date Due    Hepatitis C Screening  1945    SLP PLAN OF CARE  1945    Depression Screening PHQ-9  05/16/1959    DTaP,Tdap,and Td Vaccines (1 - Tdap) 05/16/1968    Fall Risk  05/16/2012    Urinary Incontinence Screening  05/16/2012    COLONOSCOPY  01/16/2014    GLAUCOMA SCREENING 67+ YR  05/16/2014

## 2018-04-13 NOTE — ASSESSMENT & PLAN NOTE
bp adequate in the office  Will continue on amlodipine 5mg daily and triamterene-hctz 37 5/25mg daily  Monitor BMP

## 2018-04-13 NOTE — PROGRESS NOTES
Assessment/Plan:    Benign essential hypertension  bp adequate in the office  Will continue on amlodipine 5mg daily and triamterene-hctz 37 5/25mg daily  Monitor BMP  Essential thrombocythemia ([address] )  Asymptomatic  She is on observation by Heme  Hypothyroidism  TSH WNL  Continue on levothyroxine 125mcg daily  Prediabetes  A1c stable at 5 7  Patient encouraged to lose weight  Hypokalemia  Patient noted to be on diuretics  She has been maintained on Kdur 20meq daily, reports compliance  Advised to increase intake of potassium containing foods  Will repeat    1  Benign essential hypertension  Comprehensive metabolic panel    Lipid Panel with Direct LDL reflex   2  Medicare annual wellness visit, subsequent     3  Need for hepatitis C screening test  Hepatitis C antibody   4  Screening for malignant neoplasm of breast  Mammo screening bilateral w cad   5  Essential thrombocythemia ([address] )     6  Hypokalemia     7  Acquired hypothyroidism  TSH, 3rd generation with T4 reflex   8  Prediabetes  Comprehensive metabolic panel       Medicare annual wellness visit, subsequent    Need for hepatitis C screening test  -     Hepatitis C antibody; Future    Screening for malignant neoplasm of breast  -     Mammo screening bilateral w cad; Future        Subjective:      Patient ID: Ernesto Epstein is a 81 y o  female  71yo female with HTN, Et, Hypothyroidism, fatty liver, hypokalemia and prediabetes here for follow up care  Hypertension   This is a chronic problem  The current episode started more than 1 year ago  The problem has been gradually improving since onset  The problem is controlled (she does not monitor home bp)  Past treatments include calcium channel blockers and diuretics  Hypertensive end-organ damage includes a thyroid problem  Thyroid Problem   Presents for follow-up (on levothyroxine 125mcg daily) visit  The symptoms have been stable  She is on observation for new dx of Et    She denies Serjio Hogan, weight loss  The following portions of the patient's history were reviewed and updated as appropriate: allergies, current medications, past family history, past medical history, past social history, past surgical history and problem list     Current Outpatient Prescriptions:     amLODIPine (NORVASC) 5 mg tablet, Take 1 tablet (5 mg total) by mouth daily, Disp: 90 tablet, Rfl: 1    aspirin 81 MG tablet, Take 81 mg by mouth daily  , Disp: , Rfl:     levothyroxine 125 mcg tablet, Take 125 mcg by mouth daily  , Disp: , Rfl:     Multiple Vitamin (MULTIVITAMIN) capsule, Take 1 capsule by mouth daily  , Disp: , Rfl:     potassium chloride (K-DUR) 10 mEq tablet, Take 20 mEq by mouth daily Indications: High Blood Pressure , Disp: , Rfl:     triamterene-hydrochlorothiazide (DYAZIDE) 37 5-25 mg per capsule, Take 1 capsule by mouth every morning, Disp: 90 capsule, Rfl: 1    Review of Systems   Constitutional: Negative  HENT: Negative  Respiratory: Negative  Cardiovascular: Negative  Gastrointestinal: Negative  Genitourinary: Negative  Neurological: Negative  Psychiatric/Behavioral: Negative  Objective:    /84 (BP Location: Left arm, Patient Position: Sitting)   Pulse 96   Temp 98 4 °F (36 9 °C)   Resp 16   Ht 5' 3" (1 6 m)   Wt 82 2 kg (181 lb 3 2 oz)   SpO2 98%   BMI 32 10 kg/m²      Physical Exam   Constitutional: She appears well-developed and well-nourished  No distress  HENT:   Right Ear: External ear normal    Left Ear: External ear normal    Nose: Nose normal    Mouth/Throat: Oropharynx is clear and moist  No oropharyngeal exudate  Eyes: Conjunctivae and EOM are normal  Pupils are equal, round, and reactive to light  Wears glasses   Neck: Neck supple  Thyromegaly present  Cardiovascular: Normal rate, regular rhythm, normal heart sounds and intact distal pulses  Pulmonary/Chest: Effort normal and breath sounds normal  No respiratory distress  She has no wheezes  Breast exam deferred by patient   Vitals reviewed        Recent Results (from the past 336 hour(s))   Comprehensive metabolic panel    Collection Time: 04/10/18  9:35 AM   Result Value Ref Range    Sodium 137 136 - 145 mmol/L    Potassium 3 3 (L) 3 5 - 5 3 mmol/L    Chloride 103 100 - 108 mmol/L    CO2 28 21 - 32 mmol/L    Anion Gap 6 4 - 13 mmol/L    BUN 15 5 - 25 mg/dL    Creatinine 0 85 0 60 - 1 30 mg/dL    Glucose, Fasting 87 65 - 99 mg/dL    Calcium 9 0 mg/dL    AST 18 5 - 45 U/L    ALT 19 12 - 78 U/L    Alkaline Phosphatase 93 46 - 116 U/L    Total Protein 7 7 6 4 - 8 2 g/dL    Albumin 3 6 3 5 - 5 0 g/dL    Total Bilirubin 0 45 0 20 - 1 00 mg/dL    eGFR 70 ml/min/1 73sq m   TSH, 3rd generation with T4 reflex    Collection Time: 04/10/18  9:35 AM   Result Value Ref Range    TSH 3RD GENERATON 1 050 0 358 - 3 740 uIU/mL   Lipid Panel with Direct LDL reflex    Collection Time: 04/10/18  9:35 AM   Result Value Ref Range    Cholesterol 159 50 - 200 mg/dL    Triglycerides 63 <=150 mg/dL    HDL, Direct 50 40 - 60 mg/dL    LDL Calculated 96 0 - 100 mg/dL   Hemoglobin A1c    Collection Time: 04/10/18  9:35 AM   Result Value Ref Range    Hemoglobin A1C 5 7 4 2 - 6 3 %     mg/dl

## 2018-04-13 NOTE — PATIENT INSTRUCTIONS
Obesity   AMBULATORY CARE:   Obesity  is when your body mass index (BMI) is greater than 30  Your healthcare provider will use your height and weight to measure your BMI  The risks of obesity include  many health problems, such as injuries or physical disability  You may need tests to check for the following:  · Diabetes     · High blood pressure or high cholesterol     · Heart disease     · Gallbladder or liver disease     · Cancer of the colon, breast, prostate, liver, or kidney     · Sleep apnea     · Arthritis or gout  Seek care immediately if:   · You have a severe headache, confusion, or difficulty speaking  · You have weakness on one side of your body  · You have chest pain, sweating, or shortness of breath  Contact your healthcare provider if:   · You have symptoms of gallbladder or liver disease, such as pain in your upper abdomen  · You have knee or hip pain and discomfort while walking  · You have symptoms of diabetes, such as intense hunger and thirst, and frequent urination  · You have symptoms of sleep apnea, such as snoring or daytime sleepiness  · You have questions or concerns about your condition or care  Treatment for obesity  focuses on helping you lose weight to improve your health  Even a small decrease in BMI can reduce the risk for many health problems  Your healthcare provider will help you set a weight-loss goal   · Lifestyle changes  are the first step in treating obesity  These include making healthy food choices and getting regular physical activity  Your healthcare provider may suggest a weight-loss program that involves coaching, education, and therapy  · Medicine  may help you lose weight when it is used with a healthy diet and physical activity  · Surgery  can help you lose weight if you are very obese and have other health problems  There are several types of weight-loss surgery  Ask your healthcare provider for more information    Be successful losing weight:   · Set small, realistic goals  An example of a small goal is to walk for 20 minutes 5 days a week  Anther goal is to lose 5% of your body weight  · Tell friends, family members, and coworkers about your goals  and ask for their support  Ask a friend to lose weight with you, or join a weight-loss support group  · Identify foods or triggers that may cause you to overeat , and find ways to avoid them  Remove tempting high-calorie foods from your home and workplace  Place a bowl of fresh fruit on your kitchen counter  If stress causes you to eat, then find other ways to cope with stress  · Keep a diary to track what you eat and drink  Also write down how many minutes of physical activity you do each day  Weigh yourself once a week and record it in your diary  Eating changes: You will need to eat 500 to 1,000 fewer calories each day than you currently eat to lose 1 to 2 pounds a week  The following changes will help you cut calories:  · Eat smaller portions  Use small plates, no larger than 9 inches in diameter  Fill your plate half full of fruits and vegetables  Measure your food using measuring cups until you know what a serving size looks like  · Eat 3 meals and 1 or 2 snacks each day  Plan your meals in advance  Krista Hewitt and eat at home most of the time  Eat slowly  · Eat fruits and vegetables at every meal   They are low in calories and high in fiber, which makes you feel full  Do not add butter, margarine, or cream sauce to vegetables  Use herbs to season steamed vegetables  · Eat less fat and fewer fried foods  Eat more baked or grilled chicken and fish  These protein sources are lower in calories and fat than red meat  Limit fast food  Dress your salads with olive oil and vinegar instead of bottled dressing  · Limit the amount of sugar you eat  Do not drink sugary beverages  Limit alcohol  Activity changes:  Physical activity is good for your body in many ways   It helps you burn calories and build strong muscles  It decreases stress and depression, and improves your mood  It can also help you sleep better  Talk to your healthcare provider before you begin an exercise program   · Exercise for at least 30 minutes 5 days a week  Start slowly  Set aside time each day for physical activity that you enjoy and that is convenient for you  It is best to do both weight training and an activity that increases your heart rate, such as walking, bicycling, or swimming  · Find ways to be more active  Do yard work and housecleaning  Walk up the stairs instead of using elevators  Spend your leisure time going to events that require walking, such as outdoor festivals or fairs  This extra physical activity can help you lose weight and keep it off  Follow up with your healthcare provider as directed: You may need to meet with a dietitian  Write down your questions so you remember to ask them during your visits  © 2017 [address] Information is for End User's use only and may not be sold, redistributed or otherwise used for commercial purposes  All illustrations and images included in CareNotes® are the copyrighted property of [employer]  or Servando Campbell  The above information is an  only  It is not intended as medical advice for individual conditions or treatments  Talk to your doctor, nurse or pharmacist before following any medical regimen to see if it is safe and effective for you  Urinary Incontinence   WHAT YOU NEED TO KNOW:   What is urinary incontinence? Urinary incontinence (UI) is when you lose control of your bladder  What causes UI? UI occurs because your bladder cannot store or empty urine properly  The following are the most common types of UI:  · Stress incontinence  is when you leak urine due to increased bladder pressure  This may happen when you cough, sneeze, or exercise       · Urge incontinence  is when you feel the need to urinate right away and leak urine accidentally  · Mixed incontinence  is when you have both stress and urge UI  What are the signs and symptoms of UI?   · You feel like your bladder does not empty completely when you urinate  · You urinate often and need to urinate immediately  · You leak urine when you sleep, or you wake up with the urge to urinate  · You leak urine when you cough, sneeze, exercise, or laugh  How is UI diagnosed? Your healthcare provider will ask how often you leak urine and whether you have stress or urge symptoms  Tell him which medicines you take, how often you urinate, and how much liquid you drink each day  You may need any of the following tests:  · Urine tests  may show infection or kidney function  · A pelvic exam  may be done to check for blockages  A pelvic exam will also show if your bladder, uterus, or other organs have moved out of place  · An x-ray, ultrasound, or CT  may show problems with parts of your urinary system  You may be given contrast liquid to help your organs show up better in the pictures  Tell the healthcare provider if you have ever had an allergic reaction to contrast liquid  Do not enter the MRI room with anything metal  Metal can cause serious injury  Tell the healthcare provider if you have any metal in or on your body  · A bladder scan  will show how much urine is left in your bladder after you urinate  You will be asked to urinate and then healthcare providers will use a small ultrasound machine to check the urine left in your bladder  · Cystometry  is used to check the function of your urinary system  Your healthcare provider checks the pressure in your bladder while filling it with fluid  Your bladder pressure may also be tested when your bladder is full and while you urinate  How is UI treated? · Medicines  can help strengthen your bladder control      · Electrical stimulation  is used to send a small amount of electrical energy to your pelvic floor muscles  This helps control your bladder function  Electrodes may be placed outside your body or in your rectum  For women, the electrodes may be placed in the vagina  · A bulking agent  may be injected into the wall of your urethra to make it thicker  This helps keep your urethra closed and decreases urine leakage  · Devices  such as a clamp, pessary, or tampon may help stop urine leaks  Ask your healthcare provider for more information about these and other devices  · Surgery  may be needed if other treatments do not work  Several types of surgery can help improve your bladder control  Ask your healthcare provider for more information about the surgery you may need  How can I manage my symptoms? · Do pelvic muscle exercises often  Your pelvic muscles help you stop urinating  Squeeze these muscles tight for 5 seconds, then relax for 5 seconds  Gradually work up to squeezing for 10 seconds  Do 3 sets of 15 repetitions a day, or as directed  This will help strengthen your pelvic muscles and improve bladder control  · A catheter  may be used to help empty your bladder  A catheter is a tiny, plastic tube that is put into your bladder to drain your urine  Your healthcare provider may tell you to use a catheter to prevent your bladder from getting too full and leaking urine  · Keep a UI record  Write down how often you leak urine and how much you leak  Make a note of what you were doing when you leaked urine  · Train your bladder  Go to the bathroom at set times, such as every 2 hours, even if you do not feel the urge to go  You can also try to hold your urine when you feel the urge to go  For example, hold your urine for 5 minutes when you feel the urge to go  As that becomes easier, hold your urine for 10 minutes  · Drink liquids as directed  Ask your healthcare provider how much liquid to drink each day and which liquids are best for you   You may need to limit the amount of liquid you drink to help control your urine leakage  Limit or do not have drinks that contain caffeine or alcohol  Do not drink any liquid right before you go to bed  · Prevent constipation  Eat a variety of high-fiber foods  Good examples are high-fiber cereals, beans, vegetables, and whole-grain breads  Prune juice may help make your bowel movement softer  Walking is the best way to trigger your intestines to have a bowel movement  · Exercise regularly and maintain a healthy weight  Ask your healthcare provider how much you should weigh and about the best exercise plan for you  Weight loss and exercise will decrease pressure on your bladder and help you control your leakage  Ask him to help you create a weight loss plan if you are overweight  When should I seek immediate care? · You have severe pain  · You are confused or cannot think clearly  When should I contact my healthcare provider? · You have a fever  · You see blood in your urine  · You have pain when you urinate  · You have new or worse pain, even after treatment  · Your mouth feels dry or you have vision changes  · Your urine is cloudy or smells bad  · You have questions or concerns about your condition or care  CARE AGREEMENT:   You have the right to help plan your care  Learn about your health condition and how it may be treated  Discuss treatment options with your caregivers to decide what care you want to receive  You always have the right to refuse treatment  The above information is an  only  It is not intended as medical advice for individual conditions or treatments  Talk to your doctor, nurse or pharmacist before following any medical regimen to see if it is safe and effective for you  © 2017 [address] Information is for End User's use only and may not be sold, redistributed or otherwise used for commercial purposes   All illustrations and images included in CareNotes® are the copyrighted property of [employer] M [employer] Inc  or Servando Campbell  Cigarette Smoking and Your Health   AMBULATORY CARE:   Risks to your health if you smoke:  Nicotine and other chemicals found in tobacco damage every cell in your body  Even if you are a light smoker, you have an increased risk for cancer, heart disease, and lung disease  If you are pregnant or have diabetes, smoking increases your risk for complications  Benefits to your health if you stop smoking:   · You decrease respiratory symptoms such as coughing, wheezing, and shortness of breath  · You reduce your risk for cancers of the lung, mouth, throat, kidney, bladder, pancreas, stomach, and cervix  If you already have cancer, you increase the benefits of chemotherapy  You also reduce your risk for cancer returning or a second cancer from developing  · You reduce your risk for heart disease, blood clots, heart attack, and stroke  · You reduce your risk for lung infections, and diseases such as pneumonia, asthma, chronic bronchitis, and emphysema  · Your circulation improves  More oxygen can be delivered to your body  If you have diabetes, you lower your risk for complications, such as kidney, artery, and eye diseases  You also lower your risk for nerve damage  Nerve damage can lead to amputations, poor vision, and blindness  · You improve your body's ability to heal and to fight infections  Benefits to the health of others if you stop smoking:  Tobacco is harmful to nonsmokers who breathe in your secondhand smoke  The following are ways the health of others around you may improve when you stop smoking:  · You lower the risks for lung cancer and heart disease in nonsmoking adults  · If you are pregnant, you lower the risk for miscarriage, early delivery, low birth weight, and stillbirth  You also lower your baby's risk for SIDS, obesity, developmental delay, and neurobehavioral problems, such as ADHD  · If you have children, you lower their risk for ear infections, colds, pneumonia, bronchitis, and asthma  For more information and support to stop smoking:   · Smokefree  gov  Phone: 6- 045 - 046-9732  Web Address: www smokefree  gov  Follow up with your healthcare provider as directed:  Write down your questions so you remember to ask them during your visits  © 2017 [address] Information is for End User's use only and may not be sold, redistributed or otherwise used for commercial purposes  All illustrations and images included in CareNotes® are the copyrighted property of A [employer] M [employer]  or Servando Campbell  The above information is an  only  It is not intended as medical advice for individual conditions or treatments  Talk to your doctor, nurse or pharmacist before following any medical regimen to see if it is safe and effective for you  Fall Prevention   AMBULATORY CARE:   Fall prevention  includes ways to make your home and other areas safer  It also includes ways you can move more carefully to prevent a fall  Health conditions that cause changes in your blood pressure, vision, or muscle strength and coordination may increase your risk for falls  Medicines may also increase your risk for falls if they make you dizzy, weak, or sleepy  Call 911 or have someone else call if:   · You have fallen and are unconscious  · You have fallen and cannot move part of your body  Contact your healthcare provider if:   · You have fallen and have pain or a headache  · You have questions or concerns about your condition or care  Fall prevention tips:   · Stand or sit up slowly  This may help you keep your balance and prevent falls  · Use assistive devices as directed  Your healthcare provider may suggest that you use a cane or walker to help you keep your balance  You may need to have grab bars put in your bathroom near the toilet or in the shower      · Wear shoes that fit well and have soles that   Wear shoes both inside and outside  Use slippers with good   Do not wear shoes with high heels  · Wear a personal alarm  This is a device that allows you to call 911 if you fall and need help  Ask your healthcare provider for more information  · Stay active  Exercise can help strengthen your muscles and improve your balance  Your healthcare provider may recommend water aerobics or walking  He or she may also recommend physical therapy to improve your coordination  Never start an exercise program without talking to your healthcare provider first      · Manage your medical conditions  Keep all appointments with your healthcare providers  Visit your eye doctor as directed  Home safety tips:   · Add items to prevent falls in the bathroom  Put nonslip strips on your bath or shower floor to prevent you from slipping  Use a bath mat if you do not have carpet in the bathroom  This will prevent you from falling when you step out of the bath or shower  Use a shower seat so you do not need to stand while you shower  Sit on the toilet or a chair in your bathroom to dry yourself and put on clothing  This will prevent you from losing your balance from drying or dressing yourself while you are standing  · Keep paths clear  Remove books, shoes, and other objects from walkways and stairs  Place cords for telephones and lamps out of the way so that you do not need to walk over them  Tape them down if you cannot move them  Remove small rugs  If you cannot remove a rug, secure it with double-sided tape  This will prevent you from tripping  · Install bright lights in your home  Use night lights to help light paths to the bathroom or kitchen  Always turn on the light before you start walking  · Keep items you use often on shelves within reach  Do not use a step stool to help you reach an item  · Paint or place reflective tape on the edges of your stairs    This will help you see the stairs better  Follow up with your healthcare provider as directed:  Write down your questions so you remember to ask them during your visits  © 2017 [facility] Information is for End User's use only and may not be sold, redistributed or otherwise used for commercial purposes  All illustrations and images included in CareNotes® are the copyrighted property of A [employer] M , Inc  or Servando Campbell  The above information is an  only  It is not intended as medical advice for individual conditions or treatments  Talk to your doctor, nurse or pharmacist before following any medical regimen to see if it is safe and effective for you  Advance Directives   WHAT YOU NEED TO KNOW:   What are advance directives? Advance directives are legal documents that state your wishes and plans for medical care  These plans are made ahead of time in case you lose your ability to make decisions for yourself  Advance directives can apply to any medical decision, such as the treatments you want, and if you want to donate organs  What are the types of advance directives? There are many types of advance directives, and each state has rules about how to use them  You may choose a combination of any of the following:  · Living will: This is a written record of the treatment you want  You can also choose which treatments you do not want, which to limit, and which to stop at a certain time  This includes surgery, medicine, IV fluid, and tube feedings  · Durable power of  for healthcare [place] [facility]): This is a written record that states who you want to make healthcare choices for you when you are unable to make them for yourself  This person, called a proxy, is usually a family member or a friend  You may choose more than 1 proxy  · Do not resuscitate (DNR) order:  A DNR order is used in case your heart stops beating or you stop breathing   It is a request not to have certain forms of treatment, such as CPR  A DNR order may be included in other types of advance directives  · Medical directive: This covers the care that you want if you are in a coma, near death, or unable to make decisions for yourself  You can list the treatments you want for each condition  Treatment may include pain medicine, surgery, blood transfusions, dialysis, IV or tube feedings, and a ventilator (breathing machine)  · Values history: This document has questions about your views, beliefs, and how you feel and think about life  This information can help others choose the care that you would choose  Why are advance directives important? An advance directive helps you control your care  Although spoken wishes may be used, it is better to have your wishes written down  Spoken wishes can be misunderstood, or not followed  Treatments may be given even if you do not want them  An advance directive may make it easier for your family to make difficult choices about your care  How do I decide what to put in my advance directives? · Make informed decisions:  Make sure you fully understand treatments or care you may receive  Think about the benefits and problems your decisions could cause for you or your family  Talk to healthcare providers if you have concerns or questions before you write down your wishes  You may also want to talk with your Oriental orthodox or , or a   Check your state laws to make sure that what you put in your advance directive is legal      · Sign all forms:  Sign and date your advance directive when you have finished  You may also need 2 witnesses to sign the forms  Witnesses cannot be your doctor or his staff, your spouse, heirs or beneficiaries, people you owe money to, or your chosen proxy  Talk to your family, proxy, and healthcare providers about your advance directive  Give each person a copy, and keep one for yourself in a place you can get to easily   Do not keep it hidden or locked away  · Review and revise your plans: You can revise your advance directive at any time, as long as you are able to make decisions  Review your plan every year, and when there are changes in your life, or your health  When you make changes, let your family, proxy, and healthcare providers know  Give each a new copy  Where can I find more information? · [employer]  [address] , [place] Juan[address]en 47  Phone: 6- 384 - [phone]  Phone: 1- 095 - [phone]  Web Address: http://www  aafp org  · [address] [facility])  [address] [address] [facility], [address] [facility] , [address] [place] [address]  Phone: 9- [phone]  Phone: [phone]  Web Address: Erin solis  CARE AGREEMENT:   You have the right to help plan your care  To help with this plan, you must learn about your health condition and treatment options  You must also learn about advance directives and how they are used  Work with your healthcare providers to decide what care will be used to treat you  You always have the right to refuse treatment  The above information is an  only  It is not intended as medical advice for individual conditions or treatments  Talk to your doctor, nurse or pharmacist before following any medical regimen to see if it is safe and effective for you  © 2017 [address]  Information is for End User's use only and may not be sold, redistributed or otherwise used for commercial purposes  All illustrations and images included in CareNotes® are the copyrighted property of [employer]  or Servando Campbell  Thank you for enrolling in [address] [place] [address]  Please follow the instructions below to securely access your online medical record  markeduphart allows you to send messages to your doctor, view your test results, renew your prescriptions, schedule appointments, and more      [address] uses Single Sign on (SSO) Technology for you to log in and access our [facility], including MyChart  No more remembering multiple user names and passwords! We are going to guide you through, step by step, to help you set up your Ishaan Duckworth account which will provide access to your MyChart account  How Do I Sign Up? 1  In your Internet browser, go to [email or web address]       2  Click on the [facility] patient account and then click Dont have an                 Account? Create one now      3  Enter your demographic information and chose a user name (email address) and password  Think of one that is secure and easy to remember  Enter a Referral code if you have one (this is not your MyChart code ) Accept the Terms and Conditions and the Privacy Policy  4  Select your security questions that you will use to reset your password should you forget it  Click Submit  5  Enter your MyChart Activation Code exactly as it appears below  You will not need to use this code after you have completed the sign-up process  If you do not sign up before the expiration date, you must request a new code  [employer] Activation Code: Activation code not generated  Current [employer] Status: Patient Declined    6  Confirm your email address  An email confirmation was sent to you  Please open that email and click Confirm your Email   You should then be redirected to our Ishaan Duckworth Single sign on page, where you will log on with the user name and password you created! Proceed to the [employer] to view your personal health information  Additional Information  If you have questions, you can e-mail [email or web address] or call [phone] to talk to our customer support staff  Remember, [employer] is NOT to be used for urgent needs  For medical emergencies, dial 911

## 2018-05-05 ENCOUNTER — APPOINTMENT (OUTPATIENT)
Dept: LAB | Age: 73
End: 2018-05-05
Payer: COMMERCIAL

## 2018-05-05 ENCOUNTER — TRANSCRIBE ORDERS (OUTPATIENT)
Dept: ADMINISTRATIVE | Age: 73
End: 2018-05-05

## 2018-05-05 DIAGNOSIS — D47.1 MYELOPROLIFERATIVE DISEASE (HCC): ICD-10-CM

## 2018-05-05 LAB
ALBUMIN SERPL BCP-MCNC: 3.6 G/DL (ref 3.5–5)
ALP SERPL-CCNC: 79 U/L (ref 46–116)
ALT SERPL W P-5'-P-CCNC: 21 U/L (ref 12–78)
ANION GAP SERPL CALCULATED.3IONS-SCNC: 8 MMOL/L (ref 4–13)
AST SERPL W P-5'-P-CCNC: 19 U/L (ref 5–45)
BASOPHILS # BLD AUTO: 0.07 THOUSANDS/ΜL (ref 0–0.1)
BASOPHILS NFR BLD AUTO: 1 % (ref 0–1)
BILIRUB SERPL-MCNC: 0.47 MG/DL (ref 0.2–1)
BUN SERPL-MCNC: 11 MG/DL (ref 5–25)
CALCIUM SERPL-MCNC: 9.7 MG/DL (ref 8.3–10.1)
CHLORIDE SERPL-SCNC: 104 MMOL/L (ref 100–108)
CO2 SERPL-SCNC: 26 MMOL/L (ref 21–32)
CREAT SERPL-MCNC: 0.84 MG/DL (ref 0.6–1.3)
EOSINOPHIL # BLD AUTO: 0.27 THOUSAND/ΜL (ref 0–0.61)
EOSINOPHIL NFR BLD AUTO: 3 % (ref 0–6)
ERYTHROCYTE [DISTWIDTH] IN BLOOD BY AUTOMATED COUNT: 14.3 % (ref 11.6–15.1)
GFR SERPL CREATININE-BSD FRML MDRD: 71 ML/MIN/1.73SQ M
GLUCOSE P FAST SERPL-MCNC: 91 MG/DL (ref 65–99)
HCT VFR BLD AUTO: 45.6 % (ref 34.8–46.1)
HGB BLD-MCNC: 15.1 G/DL (ref 11.5–15.4)
LYMPHOCYTES # BLD AUTO: 1.75 THOUSANDS/ΜL (ref 0.6–4.47)
LYMPHOCYTES NFR BLD AUTO: 21 % (ref 14–44)
MCH RBC QN AUTO: 31 PG (ref 26.8–34.3)
MCHC RBC AUTO-ENTMCNC: 33.1 G/DL (ref 31.4–37.4)
MCV RBC AUTO: 94 FL (ref 82–98)
MONOCYTES # BLD AUTO: 0.53 THOUSAND/ΜL (ref 0.17–1.22)
MONOCYTES NFR BLD AUTO: 6 % (ref 4–12)
NEUTROPHILS # BLD AUTO: 5.63 THOUSANDS/ΜL (ref 1.85–7.62)
NEUTS SEG NFR BLD AUTO: 69 % (ref 43–75)
NRBC BLD AUTO-RTO: 0 /100 WBCS
PLATELET # BLD AUTO: 688 THOUSANDS/UL (ref 149–390)
PMV BLD AUTO: 9.6 FL (ref 8.9–12.7)
POTASSIUM SERPL-SCNC: 3.3 MMOL/L (ref 3.5–5.3)
PROT SERPL-MCNC: 7.4 G/DL (ref 6.4–8.2)
RBC # BLD AUTO: 4.87 MILLION/UL (ref 3.81–5.12)
SODIUM SERPL-SCNC: 138 MMOL/L (ref 136–145)
URATE SERPL-MCNC: 4.9 MG/DL (ref 2–6.8)
WBC # BLD AUTO: 8.27 THOUSAND/UL (ref 4.31–10.16)

## 2018-05-05 PROCEDURE — 85025 COMPLETE CBC W/AUTO DIFF WBC: CPT

## 2018-05-05 PROCEDURE — 80053 COMPREHEN METABOLIC PANEL: CPT

## 2018-05-05 PROCEDURE — 84550 ASSAY OF BLOOD/URIC ACID: CPT

## 2018-05-05 PROCEDURE — 36415 COLL VENOUS BLD VENIPUNCTURE: CPT

## 2018-05-08 ENCOUNTER — OFFICE VISIT (OUTPATIENT)
Dept: HEMATOLOGY ONCOLOGY | Facility: CLINIC | Age: 73
End: 2018-05-08
Payer: COMMERCIAL

## 2018-05-08 VITALS
WEIGHT: 182 LBS | BODY MASS INDEX: 32.24 KG/M2 | SYSTOLIC BLOOD PRESSURE: 140 MMHG | TEMPERATURE: 98 F | DIASTOLIC BLOOD PRESSURE: 80 MMHG | RESPIRATION RATE: 16 BRPM | HEART RATE: 72 BPM

## 2018-05-08 DIAGNOSIS — D47.3 ESSENTIAL THROMBOCYTHEMIA (HCC): Primary | ICD-10-CM

## 2018-05-08 PROCEDURE — 99213 OFFICE O/P EST LOW 20 MIN: CPT | Performed by: INTERNAL MEDICINE

## 2018-05-08 NOTE — PROGRESS NOTES
Hematology Outpatient Follow - Up Note  Shena Hensley 81 y o  female MRN: @ Encounter: [id number]        Date:  5/8/2018        Assessment/ Plan:    Essential thrombocythemia positive for JAK2 mutation, platelets count 077808, stable, continue aspirin 81 mg p o  Daily no need for hydroxyurea unless platelet count above 800,000  Normal hemoglobin and WBC  Follow-up in 4 months with CBC and diff          HPI:  60-year-old  female who was seen initially 6/2017 regarding persistent thrombocytosis going back to at least March 2015  May 2017 WBC 9 3, hemoglobin 15 1, MCV 91, it gets 678,000, 69% neutrophils, 19% lymphocytes, 9% monocytes, 2% eosinophilsNovember 2016 WBC 9 6, hemoglobin 15 1, platelets 317,710  June 2016 platelets 604,973  March 2016 platelets 537,765  March 2015 platelets 320,061  She has a history of high blood pressure, hypothyroidism,  cholecystectomy in March 2016  She is a smoker - used to smoke half pack of cigarettes every day for 40 years quit in 2014  She does not drink alcohol  History negative for blood disorders of cancer  FADIA, rheumatoid factor normal   Ultrasound of the abdomen did not identify hepatosplenomegaly  DEAN 2 mutation present  Interval History:   No changes from the last visit       aspirin 81 mg p o  Daily  ECOG score 0        Test Results:    Imaging: No results found      Labs:   Lab Results   Component Value Date    WBC 8 27 05/05/2018    HGB 15 1 05/05/2018    HCT 45 6 05/05/2018    MCV 94 05/05/2018     (H) 05/05/2018     Lab Results   Component Value Date     05/05/2018    K 3 3 (L) 05/05/2018     05/05/2018    CO2 26 05/05/2018    ANIONGAP 8 05/05/2018    BUN 11 05/05/2018    CREATININE 0 84 05/05/2018    GLUCOSE 86 10/02/2017    GLUF 91 05/05/2018    CALCIUM 9 7 05/05/2018    AST 19 05/05/2018    ALT 21 05/05/2018    ALKPHOS 79 05/05/2018    PROT 7 4 05/05/2018    BILITOT 0 47 05/05/2018    EGFR 71 05/05/2018       No results found for: IRON, TIBC, FERRITIN    No results found for: [id number]      ROS:   Review of Systems   Constitutional: Negative for activity change, appetite change, diaphoresis, fatigue, fever and unexpected weight change  HENT: Negative for facial swelling, hearing loss, rhinorrhea, sinus pain, sinus pressure, sneezing, sore throat and tinnitus  Eyes: Negative for photophobia, pain, discharge, redness, itching and visual disturbance  Respiratory: Negative for apnea and chest tightness  Cardiovascular: Negative for chest pain, palpitations and leg swelling  Gastrointestinal: Negative for abdominal distention, abdominal pain, blood in stool, constipation, diarrhea, nausea, rectal pain and vomiting  Endocrine: Negative for cold intolerance, heat intolerance, polydipsia and polyphagia  Genitourinary: Negative for difficulty urinating, dyspareunia, frequency, hematuria, pelvic pain and urgency  Musculoskeletal: Negative for arthralgias, back pain, gait problem, joint swelling and myalgias  Skin: Negative for color change, pallor and rash  Allergic/Immunologic: Negative for environmental allergies and food allergies  Neurological: Negative for dizziness, tremors, seizures, syncope, speech difficulty, numbness and headaches  Hematological: Negative for adenopathy  Does not bruise/bleed easily  Psychiatric/Behavioral: Negative for agitation, confusion, dysphoric mood, hallucinations and suicidal ideas  Current Medications: Reviewed  Allergies: Reviewed  PMH/FH/SH:  Reviewed      Physical Exam:    Body surface area is 1 86 meters squared      Wt Readings from Last 3 Encounters:   05/08/18 82 6 kg (182 lb)   04/13/18 82 2 kg (181 lb 3 2 oz)   02/06/18 80 3 kg (177 lb)        Temp Readings from Last 3 Encounters:   05/08/18 98 °F (36 7 °C)   04/13/18 98 4 °F (36 9 °C)   02/06/18 (!) 97 °F (36 1 °C)        BP Readings from Last 3 Encounters:   05/08/18 140/80   04/13/18 130/84   02/06/18 142/88 Pulse Readings from Last 3 Encounters:   05/08/18 72   04/13/18 96   02/06/18 86        Physical Exam   Constitutional: She is oriented to person, place, and time  She appears well-developed and well-nourished  No distress  Flushed face and upper chest area   HENT:   Head: Normocephalic and atraumatic  Mouth/Throat: Oropharynx is clear and moist  No oropharyngeal exudate  Eyes: Conjunctivae and EOM are normal  Pupils are equal, round, and reactive to light  Neck: Normal range of motion  Neck supple  No tracheal deviation present  No thyromegaly present  Cardiovascular: Normal rate and regular rhythm  Exam reveals no gallop and no friction rub  No murmur heard  Pulmonary/Chest: Effort normal and breath sounds normal  No respiratory distress  She has no wheezes  She has no rales  She exhibits no tenderness  Abdominal: Soft  Bowel sounds are normal  She exhibits no distension and no mass  There is no tenderness  There is no rebound and no guarding  Musculoskeletal: Normal range of motion  Lymphadenopathy:     She has no cervical adenopathy  Neurological: She is alert and oriented to person, place, and time  Skin: Skin is warm and dry  No rash noted  She is not diaphoretic  No erythema  No pallor  Psychiatric: She has a normal mood and affect  Her behavior is normal  Judgment and thought content normal    Vitals reviewed  Goals and Barriers:  Current Goal: Minimize effects of disease  Barriers: None  Patient's Capacity to Self Care:  Patient is able to self care      Code Status: [unfilled]

## 2018-05-08 NOTE — LETTER
May 8, 2018     April Bargerch,   [address]  [address] [place] [address], [address] [place] [address],[address]    Patient: Shena Hensley   YOB: 1945   Date of Visit: 5/8/2018       Dear Dr Erna Benson: Thank you for referring Nick Hernandez to me for evaluation  Below are my notes for this consultation  If you have questions, please do not hesitate to call me  I look forward to following your patient along with you  Sincerely,        Katja Paul MD        CC: No Recipients  Katja Paul MD  5/8/2018  8:12 AM  Sign at close encounter  Hematology Outpatient Follow - Up Note  Shena Hensley 81 y o  female MRN: @ Encounter: [id number]        Date:  5/8/2018        Assessment/ Plan:    Essential thrombocythemia positive for JAK2 mutation, platelets count 467989, stable, continue aspirin 81 mg p o  Daily no need for hydroxyurea unless platelet count above 800,000  Normal hemoglobin and WBC  Follow-up in 4 months with CBC and diff          HPI:  60-year-old  female who was seen initially 6/2017 regarding persistent thrombocytosis going back to at least March 2015  May 2017 WBC 9 3, hemoglobin 15 1, MCV 91, it gets 678,000, 69% neutrophils, 19% lymphocytes, 9% monocytes, 2% eosinophilsNovember 2016 WBC 9 6, hemoglobin 15 1, platelets 746,355  June 2016 platelets 756,551  March 2016 platelets 451,155  March 2015 platelets 552,708  She has a history of high blood pressure, hypothyroidism,  cholecystectomy in March 2016  She is a smoker - used to smoke half pack of cigarettes every day for 40 years quit in 2014  She does not drink alcohol  History negative for blood disorders of cancer  FADIA, rheumatoid factor normal   Ultrasound of the abdomen did not identify hepatosplenomegaly  DEAN 2 mutation present  Interval History:   No changes from the last visit       aspirin 81 mg p o  Daily  ECOG score 0        Test Results:    Imaging: No results found      Labs:   Lab Results   Component Value Date WBC 8 27 05/05/2018    HGB 15 1 05/05/2018    HCT 45 6 05/05/2018    MCV 94 05/05/2018     (H) 05/05/2018     Lab Results   Component Value Date     05/05/2018    K 3 3 (L) 05/05/2018     05/05/2018    CO2 26 05/05/2018    ANIONGAP 8 05/05/2018    BUN 11 05/05/2018    CREATININE 0 84 05/05/2018    GLUCOSE 86 10/02/2017    GLUF 91 05/05/2018    CALCIUM 9 7 05/05/2018    AST 19 05/05/2018    ALT 21 05/05/2018    ALKPHOS 79 05/05/2018    PROT 7 4 05/05/2018    BILITOT 0 47 05/05/2018    EGFR 71 05/05/2018       No results found for: IRON, TIBC, FERRITIN    No results found for: [id number]      ROS:   Review of Systems   Constitutional: Negative for activity change, appetite change, diaphoresis, fatigue, fever and unexpected weight change  HENT: Negative for facial swelling, hearing loss, rhinorrhea, sinus pain, sinus pressure, sneezing, sore throat and tinnitus  Eyes: Negative for photophobia, pain, discharge, redness, itching and visual disturbance  Respiratory: Negative for apnea and chest tightness  Cardiovascular: Negative for chest pain, palpitations and leg swelling  Gastrointestinal: Negative for abdominal distention, abdominal pain, blood in stool, constipation, diarrhea, nausea, rectal pain and vomiting  Endocrine: Negative for cold intolerance, heat intolerance, polydipsia and polyphagia  Genitourinary: Negative for difficulty urinating, dyspareunia, frequency, hematuria, pelvic pain and urgency  Musculoskeletal: Negative for arthralgias, back pain, gait problem, joint swelling and myalgias  Skin: Negative for color change, pallor and rash  Allergic/Immunologic: Negative for environmental allergies and food allergies  Neurological: Negative for dizziness, tremors, seizures, syncope, speech difficulty, numbness and headaches  Hematological: Negative for adenopathy  Does not bruise/bleed easily     Psychiatric/Behavioral: Negative for agitation, confusion, dysphoric mood, hallucinations and suicidal ideas  Current Medications: Reviewed  Allergies: Reviewed  PMH/FH/SH:  Reviewed      Physical Exam:    Body surface area is 1 86 meters squared  Wt Readings from Last 3 Encounters:   05/08/18 82 6 kg (182 lb)   04/13/18 82 2 kg (181 lb 3 2 oz)   02/06/18 80 3 kg (177 lb)        Temp Readings from Last 3 Encounters:   05/08/18 98 °F (36 7 °C)   04/13/18 98 4 °F (36 9 °C)   02/06/18 (!) 97 °F (36 1 °C)        BP Readings from Last 3 Encounters:   05/08/18 140/80   04/13/18 130/84   02/06/18 142/88         Pulse Readings from Last 3 Encounters:   05/08/18 72   04/13/18 96   02/06/18 86        Physical Exam   Constitutional: She is oriented to person, place, and time  She appears well-developed and well-nourished  No distress  Flushed face and upper chest area   HENT:   Head: Normocephalic and atraumatic  Mouth/Throat: Oropharynx is clear and moist  No oropharyngeal exudate  Eyes: Conjunctivae and EOM are normal  Pupils are equal, round, and reactive to light  Neck: Normal range of motion  Neck supple  No tracheal deviation present  No thyromegaly present  Cardiovascular: Normal rate and regular rhythm  Exam reveals no gallop and no friction rub  No murmur heard  Pulmonary/Chest: Effort normal and breath sounds normal  No respiratory distress  She has no wheezes  She has no rales  She exhibits no tenderness  Abdominal: Soft  Bowel sounds are normal  She exhibits no distension and no mass  There is no tenderness  There is no rebound and no guarding  Musculoskeletal: Normal range of motion  Lymphadenopathy:     She has no cervical adenopathy  Neurological: She is alert and oriented to person, place, and time  Skin: Skin is warm and dry  No rash noted  She is not diaphoretic  No erythema  No pallor  Psychiatric: She has a normal mood and affect  Her behavior is normal  Judgment and thought content normal    Vitals reviewed            Goals and Barriers: Current Goal: Minimize effects of disease  Barriers: None  Patient's Capacity to Self Care:  Patient is able to self care      Code [facility]

## 2018-06-13 DIAGNOSIS — E87.6 HYPOKALEMIA: Primary | ICD-10-CM

## 2018-06-13 RX ORDER — POTASSIUM CHLORIDE 1500 MG/1
20 TABLET, EXTENDED RELEASE ORAL DAILY
Qty: 90 TABLET | Refills: 1 | Status: SHIPPED | OUTPATIENT
Start: 2018-06-13 | End: 2019-03-11 | Stop reason: SDUPTHER

## 2018-06-13 RX ORDER — POTASSIUM CHLORIDE 1500 MG/1
TABLET, EXTENDED RELEASE ORAL
COMMUNITY
Start: 2018-04-05 | End: 2018-06-13 | Stop reason: SDUPTHER

## 2018-08-27 DIAGNOSIS — I10 BENIGN ESSENTIAL HYPERTENSION: ICD-10-CM

## 2018-08-27 DIAGNOSIS — E03.9 ACQUIRED HYPOTHYROIDISM: Primary | ICD-10-CM

## 2018-08-27 RX ORDER — LEVOTHYROXINE SODIUM 0.12 MG/1
125 TABLET ORAL DAILY
Qty: 90 TABLET | Refills: 1 | Status: SHIPPED | OUTPATIENT
Start: 2018-08-27 | End: 2019-02-28 | Stop reason: SDUPTHER

## 2018-08-27 RX ORDER — TRIAMTERENE AND HYDROCHLOROTHIAZIDE 37.5; 25 MG/1; MG/1
1 CAPSULE ORAL EVERY MORNING
Qty: 90 CAPSULE | Refills: 1 | Status: SHIPPED | OUTPATIENT
Start: 2018-08-27 | End: 2018-10-17

## 2018-09-08 ENCOUNTER — APPOINTMENT (OUTPATIENT)
Dept: LAB | Age: 73
End: 2018-09-08
Payer: COMMERCIAL

## 2018-09-08 DIAGNOSIS — D47.3 ESSENTIAL THROMBOCYTHEMIA (HCC): ICD-10-CM

## 2018-09-08 LAB
BASOPHILS # BLD AUTO: 0.11 THOUSANDS/ΜL (ref 0–0.1)
BASOPHILS NFR BLD AUTO: 1 % (ref 0–1)
EOSINOPHIL # BLD AUTO: 0.18 THOUSAND/ΜL (ref 0–0.61)
EOSINOPHIL NFR BLD AUTO: 2 % (ref 0–6)
ERYTHROCYTE [DISTWIDTH] IN BLOOD BY AUTOMATED COUNT: 14.5 % (ref 11.6–15.1)
HCT VFR BLD AUTO: 45.2 % (ref 34.8–46.1)
HGB BLD-MCNC: 14.8 G/DL (ref 11.5–15.4)
IMM GRANULOCYTES # BLD AUTO: 0.05 THOUSAND/UL (ref 0–0.2)
IMM GRANULOCYTES NFR BLD AUTO: 1 % (ref 0–2)
LYMPHOCYTES # BLD AUTO: 1.77 THOUSANDS/ΜL (ref 0.6–4.47)
LYMPHOCYTES NFR BLD AUTO: 16 % (ref 14–44)
MCH RBC QN AUTO: 30.3 PG (ref 26.8–34.3)
MCHC RBC AUTO-ENTMCNC: 32.7 G/DL (ref 31.4–37.4)
MCV RBC AUTO: 92 FL (ref 82–98)
MONOCYTES # BLD AUTO: 1.03 THOUSAND/ΜL (ref 0.17–1.22)
MONOCYTES NFR BLD AUTO: 9 % (ref 4–12)
NEUTROPHILS # BLD AUTO: 7.97 THOUSANDS/ΜL (ref 1.85–7.62)
NEUTS SEG NFR BLD AUTO: 71 % (ref 43–75)
NRBC BLD AUTO-RTO: 0 /100 WBCS
PLATELET # BLD AUTO: 710 THOUSANDS/UL (ref 149–390)
PMV BLD AUTO: 9.5 FL (ref 8.9–12.7)
RBC # BLD AUTO: 4.89 MILLION/UL (ref 3.81–5.12)
WBC # BLD AUTO: 11.11 THOUSAND/UL (ref 4.31–10.16)

## 2018-09-08 PROCEDURE — 85025 COMPLETE CBC W/AUTO DIFF WBC: CPT

## 2018-09-08 PROCEDURE — 36415 COLL VENOUS BLD VENIPUNCTURE: CPT

## 2018-09-11 NOTE — PROGRESS NOTES
Hematology Outpatient Follow - Up Note  Nayana Perla 72 y o  female MRN: @ Encounter: [id number]        Date:  9/12/2018    Assessment/ Plan:    Essential thrombocythemia, positive for JAK2 mutation, platelets count 338454, stable, continue aspirin 81 mg p o  Daily  No need for hydroxyurea unless platelet count above 800,000  Normal hemoglobin and WBC  Follow-up in 4 months with CBC and diff  HPI:   74-year-old  female who was seen initially 6/2017 regarding persistent thrombocytosis going back to at least March 2015  May 2017 WBC 9 3, hemoglobin 15 1, MCV 91, it gets 678,000, 69% neutrophils, 19% lymphocytes, 9% monocytes, 2% eosinophilsNovember 2016 WBC 9 6, hemoglobin 15 1, platelets 007,629   June 2016 platelets 018,779   March 2016 platelets 443,361   March 2015 platelets 597,347  She has a history of high blood pressure, hypothyroidism,  cholecystectomy in March 2016   She is a smoker - used to smoke half pack of cigarettes every day for 40 years quit in 2014   She does not drink alcohol   History negative for blood disorders of cancer   FADIA, rheumatoid factor normal   Ultrasound of the abdomen did not identify hepatosplenomegaly   DEAN 2 mutation present  Interval History:  She reports no health changes  She is feeling well            Test Results:      Labs:   Lab Results   Component Value Date    HGB 14 8 09/08/2018    HCT 45 2 09/08/2018    MCV 92 09/08/2018     (H) 09/08/2018    WBC 11 11 (H) 09/08/2018    NRBC 0 09/08/2018     Lab Results   Component Value Date     05/05/2018    K 3 3 (L) 05/05/2018     05/05/2018    CO2 26 05/05/2018    ANIONGAP 13 12/29/2015    BUN 11 05/05/2018    CREATININE 0 84 05/05/2018    GLUCOSE 172 (H) 12/29/2015    GLUF 91 05/05/2018    CALCIUM 9 7 05/05/2018    AST 19 05/05/2018    ALT 21 05/05/2018    ALKPHOS 79 05/05/2018    PROT 7 9 09/05/2015    BILITOT 0 47 09/05/2015    EGFR 71 05/05/2018         Imaging: No results found       ROS:   As mentioned in HPI & Interval History otherwise 14 point ROS negative  Allergies: No Known Allergies  Current Medications: Reviewed  PMH/FH/SH:  Reviewed      Physical Exam:    There is no height or weight on file to calculate BSA  Ht Readings from Last 3 Encounters:   04/13/18 5' 3" (1 6 m)   02/06/18 5' 3" (1 6 m)   10/06/17 5' 3 5" (1 613 m)       Wt Readings from Last 3 Encounters:   05/08/18 82 6 kg (182 lb)   04/13/18 82 2 kg (181 lb 3 2 oz)   02/06/18 80 3 kg (177 lb)        Temp Readings from Last 3 Encounters:   05/08/18 98 °F (36 7 °C)   04/13/18 98 4 °F (36 9 °C)   02/06/18 (!) 97 °F (36 1 °C)        BP Readings from Last 3 Encounters:   05/08/18 140/80   04/13/18 130/84   02/06/18 142/88           Physical Exam   Constitutional: She is oriented to person, place, and time  She appears well-developed and well-nourished  No distress  HENT:   Head: Normocephalic and atraumatic  Nose: Nose normal    Mouth/Throat: Oropharynx is clear and moist    Eyes: Conjunctivae and EOM are normal  Pupils are equal, round, and reactive to light  No scleral icterus  Neck: Normal range of motion  Neck supple  No tracheal deviation present  No thyromegaly present  Cardiovascular: Normal rate, regular rhythm, normal heart sounds and intact distal pulses  Exam reveals no gallop and no friction rub  No murmur heard  Pulmonary/Chest: Effort normal and breath sounds normal  No stridor  No respiratory distress  She has no wheezes  She has no rales  She exhibits no tenderness  Abdominal: Soft  Bowel sounds are normal  She exhibits no distension and no mass  There is no tenderness  There is no rebound and no guarding  Musculoskeletal: Normal range of motion  She exhibits no edema, tenderness or deformity  Lymphadenopathy:     She has no cervical adenopathy  Neurological: She is alert and oriented to person, place, and time  No cranial nerve deficit   Coordination normal    Skin: Skin is warm and dry  No rash noted  She is not diaphoretic  No erythema  No pallor  Psychiatric: She has a normal mood and affect  Her behavior is normal  Judgment and thought content normal          Goals and Barriers:  Current Goal:  Remain free from falls  Barriers: None  Patient's Capacity to Self Care:  Patient is able to self care

## 2018-09-12 ENCOUNTER — OFFICE VISIT (OUTPATIENT)
Dept: HEMATOLOGY ONCOLOGY | Facility: CLINIC | Age: 73
End: 2018-09-12
Payer: COMMERCIAL

## 2018-09-12 VITALS
BODY MASS INDEX: 32.11 KG/M2 | DIASTOLIC BLOOD PRESSURE: 80 MMHG | TEMPERATURE: 97.9 F | HEART RATE: 75 BPM | HEIGHT: 63 IN | OXYGEN SATURATION: 95 % | RESPIRATION RATE: 16 BRPM | SYSTOLIC BLOOD PRESSURE: 140 MMHG | WEIGHT: 181.2 LBS

## 2018-09-12 DIAGNOSIS — D47.3 ESSENTIAL THROMBOCYTHEMIA (HCC): Primary | ICD-10-CM

## 2018-09-12 PROCEDURE — 99212 OFFICE O/P EST SF 10 MIN: CPT | Performed by: PHYSICIAN ASSISTANT

## 2018-09-20 DIAGNOSIS — I10 BENIGN ESSENTIAL HYPERTENSION: ICD-10-CM

## 2018-09-20 DIAGNOSIS — I10 BENIGN HYPERTENSION: ICD-10-CM

## 2018-09-20 RX ORDER — AMLODIPINE BESYLATE 5 MG/1
5 TABLET ORAL DAILY
Qty: 90 TABLET | Refills: 1 | Status: SHIPPED | OUTPATIENT
Start: 2018-09-20 | End: 2019-03-11 | Stop reason: SDUPTHER

## 2018-10-14 ENCOUNTER — APPOINTMENT (OUTPATIENT)
Dept: LAB | Age: 73
End: 2018-10-14
Payer: COMMERCIAL

## 2018-10-14 DIAGNOSIS — E03.9 ACQUIRED HYPOTHYROIDISM: ICD-10-CM

## 2018-10-14 DIAGNOSIS — I10 BENIGN ESSENTIAL HYPERTENSION: ICD-10-CM

## 2018-10-14 DIAGNOSIS — D47.3 ESSENTIAL THROMBOCYTHEMIA (HCC): ICD-10-CM

## 2018-10-14 DIAGNOSIS — R73.03 PREDIABETES: ICD-10-CM

## 2018-10-14 DIAGNOSIS — Z11.59 NEED FOR HEPATITIS C SCREENING TEST: ICD-10-CM

## 2018-10-14 LAB
ALBUMIN SERPL BCP-MCNC: 3.8 G/DL (ref 3.5–5)
ALP SERPL-CCNC: 83 U/L (ref 46–116)
ALT SERPL W P-5'-P-CCNC: 28 U/L (ref 12–78)
ANION GAP SERPL CALCULATED.3IONS-SCNC: 4 MMOL/L (ref 4–13)
AST SERPL W P-5'-P-CCNC: 17 U/L (ref 5–45)
BASOPHILS # BLD AUTO: 0.11 THOUSANDS/ΜL (ref 0–0.1)
BASOPHILS NFR BLD AUTO: 1 % (ref 0–1)
BILIRUB SERPL-MCNC: 0.69 MG/DL (ref 0.2–1)
BUN SERPL-MCNC: 10 MG/DL (ref 5–25)
CALCIUM SERPL-MCNC: 9.3 MG/DL (ref 8.3–10.1)
CHLORIDE SERPL-SCNC: 100 MMOL/L (ref 100–108)
CHOLEST SERPL-MCNC: 177 MG/DL (ref 50–200)
CO2 SERPL-SCNC: 29 MMOL/L (ref 21–32)
CREAT SERPL-MCNC: 0.84 MG/DL (ref 0.6–1.3)
EOSINOPHIL # BLD AUTO: 0.25 THOUSAND/ΜL (ref 0–0.61)
EOSINOPHIL NFR BLD AUTO: 3 % (ref 0–6)
ERYTHROCYTE [DISTWIDTH] IN BLOOD BY AUTOMATED COUNT: 14 % (ref 11.6–15.1)
GFR SERPL CREATININE-BSD FRML MDRD: 70 ML/MIN/1.73SQ M
GLUCOSE P FAST SERPL-MCNC: 108 MG/DL (ref 65–99)
HCT VFR BLD AUTO: 47.1 % (ref 34.8–46.1)
HCV AB SER QL: NORMAL
HDLC SERPL-MCNC: 44 MG/DL (ref 40–60)
HGB BLD-MCNC: 15.7 G/DL (ref 11.5–15.4)
IMM GRANULOCYTES # BLD AUTO: 0.06 THOUSAND/UL (ref 0–0.2)
IMM GRANULOCYTES NFR BLD AUTO: 1 % (ref 0–2)
LDLC SERPL CALC-MCNC: 112 MG/DL (ref 0–100)
LYMPHOCYTES # BLD AUTO: 1.66 THOUSANDS/ΜL (ref 0.6–4.47)
LYMPHOCYTES NFR BLD AUTO: 17 % (ref 14–44)
MCH RBC QN AUTO: 30.7 PG (ref 26.8–34.3)
MCHC RBC AUTO-ENTMCNC: 33.3 G/DL (ref 31.4–37.4)
MCV RBC AUTO: 92 FL (ref 82–98)
MONOCYTES # BLD AUTO: 0.79 THOUSAND/ΜL (ref 0.17–1.22)
MONOCYTES NFR BLD AUTO: 8 % (ref 4–12)
NEUTROPHILS # BLD AUTO: 6.95 THOUSANDS/ΜL (ref 1.85–7.62)
NEUTS SEG NFR BLD AUTO: 70 % (ref 43–75)
NRBC BLD AUTO-RTO: 0 /100 WBCS
PLATELET # BLD AUTO: 799 THOUSANDS/UL (ref 149–390)
PMV BLD AUTO: 9.5 FL (ref 8.9–12.7)
POTASSIUM SERPL-SCNC: 3.6 MMOL/L (ref 3.5–5.3)
PROT SERPL-MCNC: 7.7 G/DL (ref 6.4–8.2)
RBC # BLD AUTO: 5.11 MILLION/UL (ref 3.81–5.12)
SODIUM SERPL-SCNC: 133 MMOL/L (ref 136–145)
TRIGL SERPL-MCNC: 103 MG/DL
TSH SERPL DL<=0.05 MIU/L-ACNC: 0.87 UIU/ML (ref 0.36–3.74)
WBC # BLD AUTO: 9.82 THOUSAND/UL (ref 4.31–10.16)

## 2018-10-14 PROCEDURE — 85025 COMPLETE CBC W/AUTO DIFF WBC: CPT

## 2018-10-14 PROCEDURE — 36415 COLL VENOUS BLD VENIPUNCTURE: CPT

## 2018-10-14 PROCEDURE — 80061 LIPID PANEL: CPT

## 2018-10-14 PROCEDURE — 80053 COMPREHEN METABOLIC PANEL: CPT

## 2018-10-14 PROCEDURE — 84443 ASSAY THYROID STIM HORMONE: CPT

## 2018-10-14 PROCEDURE — 86803 HEPATITIS C AB TEST: CPT

## 2018-10-16 NOTE — PROGRESS NOTES
Assessment/Plan:    Benign essential hypertension  She is normotensive on current regimen of amlodipine 5mg daily and triamterene/hctz 37 5/25mg but due to metabolic derangement, will d/c triamterene/hctz and start hctz 12 5mg daily  She is to call in 3 weeks with bp log for review  She is also to decrease dose of klor-con to 20meq 1/2 tab daily    Hyponatremia  Likely secondary to diuretics  Will d/c triamterene/hctz and start on hctz 12 5mg daily with repeat bmp in 5 days    Hypothyroidism  TSH is WNL  Continue on levothyroxine 125mcg daily    1  Need for influenza vaccination  influenza vaccine, 1101-5320, high-dose, PF 0 5 mL, for patients 65 yr+ (FLUZONE HIGH-DOSE)   2  Benign essential hypertension  Basic metabolic panel    Comprehensive metabolic panel   3  Essential thrombocythemia ([address] )      has heat flashes  Platelet count baseline around 600-700s  She is on observation per Heme   4  Acquired hypothyroidism     5  Hyponatremia  hydrochlorothiazide (MICROZIDE) 12 5 mg capsule    Basic metabolic panel   6  Grief reaction      secondary to loss of a long time friend  Pt advised to return if symptoms worsen  Subjective:      Patient ID: Mika Soto is a 72 y o  female  79yo female with HTN, ET, hypothyroidism, prediabetes, fatty liver here for follow up care  Hypertension   This is a chronic problem  The current episode started more than 1 year ago  The problem is controlled (she has not been monitoring her home bp)  Associated symptoms include headaches  Past treatments include diuretics and calcium channel blockers  Identifiable causes of hypertension include a thyroid problem  Thyroid Problem   Presents for follow-up visit  Patient reports no anxiety, weight gain or weight loss  (On levothyroxine 125mcg daily)     She has been feeling depressed the death of her friend whom she's known for 20 years  She continues to have sleeping difficulties      The following portions of the patient's history were reviewed and updated as appropriate: allergies, current medications, past family history, past medical history, past social history, past surgical history and problem list     Current Outpatient Prescriptions:     amLODIPine (NORVASC) 5 mg tablet, Take 1 tablet (5 mg total) by mouth daily, Disp: 90 tablet, Rfl: 1    aspirin 81 MG tablet, Take 81 mg by mouth daily  , Disp: , Rfl:     KLOR-CON M20 20 MEQ tablet, Take 1 tablet (20 mEq total) by mouth daily, Disp: 90 tablet, Rfl: 1    levothyroxine 125 mcg tablet, Take 1 tablet (125 mcg total) by mouth daily, Disp: 90 tablet, Rfl: 1    Multiple Vitamin (MULTIVITAMIN) capsule, Take 1 capsule by mouth daily  , Disp: , Rfl:     hydrochlorothiazide (MICROZIDE) 12 5 mg capsule, Take 1 capsule (12 5 mg total) by mouth every morning, Disp: 30 capsule, Rfl: 0      Review of Systems   Constitutional: Negative for weight gain and weight loss  Heat flashes   HENT: Negative  Respiratory: Negative  Cardiovascular: Negative  Gastrointestinal: Negative  Genitourinary: Negative  Musculoskeletal:        Occasional leg cramps   Neurological: Positive for headaches  Negative for numbness  Occasional dizziness   Psychiatric/Behavioral: Positive for dysphoric mood and sleep disturbance  The patient is not nervous/anxious  Objective:    /80 (BP Location: Left arm, Patient Position: Sitting)   Pulse 98   Temp 98 °F (36 7 °C)   Resp 16   Ht 5' 3" (1 6 m)   Wt 81 8 kg (180 lb 6 4 oz)   SpO2 97%   BMI 31 96 kg/m²      Physical Exam   Constitutional: She is oriented to person, place, and time  She appears well-developed and well-nourished  No distress  HENT:   Mouth/Throat: Oropharynx is clear and moist    Eyes:   Wears glasses   Neck: Neck supple  Cardiovascular: Normal rate, regular rhythm, normal heart sounds and intact distal pulses      Pulmonary/Chest: Effort normal and breath sounds normal  No respiratory distress  She has no wheezes  Neurological: She is alert and oriented to person, place, and time  Skin: Skin is dry  Psychiatric: She has a normal mood and affect  Her behavior is normal    Vitals reviewed        Recent Results (from the past 168 hour(s))   Hepatitis C antibody    Collection Time: 10/14/18  9:13 AM   Result Value Ref Range    Hepatitis C Ab Non-reactive Non-reactive   Lipid Panel with Direct LDL reflex    Collection Time: 10/14/18  9:13 AM   Result Value Ref Range    Cholesterol 177 50 - 200 mg/dL    Triglycerides 103 <=150 mg/dL    HDL, Direct 44 40 - 60 mg/dL    LDL Calculated 112 (H) 0 - 100 mg/dL   TSH, 3rd generation with T4 reflex    Collection Time: 10/14/18  9:13 AM   Result Value Ref Range    TSH 3RD GENERATON 0 869 0 358 - 3 740 uIU/mL   CBC and differential    Collection Time: 10/14/18  9:13 AM   Result Value Ref Range    WBC 9 82 4 31 - 10 16 Thousand/uL    RBC 5 11 3 81 - 5 12 Million/uL    Hemoglobin 15 7 (H) 11 5 - 15 4 g/dL    Hematocrit 47 1 (H) 34 8 - 46 1 %    MCV 92 82 - 98 fL    MCH 30 7 26 8 - 34 3 pg    MCHC 33 3 31 4 - 37 4 g/dL    RDW 14 0 11 6 - 15 1 %    MPV 9 5 8 9 - 12 7 fL    Platelets 394 (H) 813 - 390 Thousands/uL    nRBC 0 /100 WBCs    Neutrophils Relative 70 43 - 75 %    Immat GRANS % 1 0 - 2 %    Lymphocytes Relative 17 14 - 44 %    Monocytes Relative 8 4 - 12 %    Eosinophils Relative 3 0 - 6 %    Basophils Relative 1 0 - 1 %    Neutrophils Absolute 6 95 1 85 - 7 62 Thousands/µL    Immature Grans Absolute 0 06 0 00 - 0 20 Thousand/uL    Lymphocytes Absolute 1 66 0 60 - 4 47 Thousands/µL    Monocytes Absolute 0 79 0 17 - 1 22 Thousand/µL    Eosinophils Absolute 0 25 0 00 - 0 61 Thousand/µL    Basophils Absolute 0 11 (H) 0 00 - 0 10 Thousands/µL   Comprehensive metabolic panel    Collection Time: 10/14/18  9:13 AM   Result Value Ref Range    Sodium 133 (L) 136 - 145 mmol/L    Potassium 3 6 3 5 - 5 3 mmol/L    Chloride 100 100 - 108 mmol/L    CO2 29 21 - 32 mmol/L    ANION GAP 4 4 - 13 mmol/L    BUN 10 5 - 25 mg/dL    Creatinine 0 84 0 60 - 1 30 mg/dL    Glucose, Fasting 108 (H) 65 - 99 mg/dL    Calcium 9 3 8 3 - 10 1 mg/dL    AST 17 5 - 45 U/L    ALT 28 12 - 78 U/L    Alkaline Phosphatase 83 46 - 116 U/L    Total Protein 7 7 6 4 - 8 2 g/dL    Albumin 3 8 3 5 - 5 0 g/dL    Total Bilirubin 0 69 0 20 - 1 00 mg/dL    eGFR 70 ml/min/1 73sq m

## 2018-10-17 ENCOUNTER — OFFICE VISIT (OUTPATIENT)
Dept: INTERNAL MEDICINE CLINIC | Facility: CLINIC | Age: 73
End: 2018-10-17
Payer: COMMERCIAL

## 2018-10-17 VITALS
HEART RATE: 98 BPM | BODY MASS INDEX: 31.96 KG/M2 | WEIGHT: 180.4 LBS | DIASTOLIC BLOOD PRESSURE: 80 MMHG | RESPIRATION RATE: 16 BRPM | HEIGHT: 63 IN | OXYGEN SATURATION: 97 % | TEMPERATURE: 98 F | SYSTOLIC BLOOD PRESSURE: 110 MMHG

## 2018-10-17 DIAGNOSIS — E87.1 HYPONATREMIA: ICD-10-CM

## 2018-10-17 DIAGNOSIS — Z23 NEED FOR INFLUENZA VACCINATION: ICD-10-CM

## 2018-10-17 DIAGNOSIS — E03.9 ACQUIRED HYPOTHYROIDISM: ICD-10-CM

## 2018-10-17 DIAGNOSIS — I10 BENIGN ESSENTIAL HYPERTENSION: Primary | ICD-10-CM

## 2018-10-17 DIAGNOSIS — F43.21 GRIEF REACTION: ICD-10-CM

## 2018-10-17 DIAGNOSIS — D47.3 ESSENTIAL THROMBOCYTHEMIA (HCC): ICD-10-CM

## 2018-10-17 PROCEDURE — 3074F SYST BP LT 130 MM HG: CPT | Performed by: INTERNAL MEDICINE

## 2018-10-17 PROCEDURE — 3079F DIAST BP 80-89 MM HG: CPT | Performed by: INTERNAL MEDICINE

## 2018-10-17 PROCEDURE — 4040F PNEUMOC VAC/ADMIN/RCVD: CPT

## 2018-10-17 PROCEDURE — G0008 ADMIN INFLUENZA VIRUS VAC: HCPCS

## 2018-10-17 PROCEDURE — 3008F BODY MASS INDEX DOCD: CPT | Performed by: INTERNAL MEDICINE

## 2018-10-17 PROCEDURE — 90662 IIV NO PRSV INCREASED AG IM: CPT

## 2018-10-17 PROCEDURE — 99214 OFFICE O/P EST MOD 30 MIN: CPT | Performed by: INTERNAL MEDICINE

## 2018-10-17 RX ORDER — HYDROCHLOROTHIAZIDE 12.5 MG/1
12.5 CAPSULE, GELATIN COATED ORAL EVERY MORNING
Qty: 30 CAPSULE | Refills: 0 | Status: SHIPPED | OUTPATIENT
Start: 2018-10-17 | End: 2018-11-14 | Stop reason: SDUPTHER

## 2018-10-17 NOTE — ASSESSMENT & PLAN NOTE
She is normotensive on current regimen of amlodipine 5mg daily and triamterene/hctz 37 5/25mg but due to metabolic derangement, will d/c triamterene/hctz and start hctz 12 5mg daily  She is to call in 3 weeks with bp log for review    She is also to decrease dose of klor-con to 20meq 1/2 tab daily

## 2018-10-17 NOTE — ASSESSMENT & PLAN NOTE
Likely secondary to diuretics    Will d/c triamterene/hctz and start on hctz 12 5mg daily with repeat bmp in 5 days

## 2018-10-22 ENCOUNTER — TELEPHONE (OUTPATIENT)
Dept: INTERNAL MEDICINE CLINIC | Facility: CLINIC | Age: 73
End: 2018-10-22

## 2018-10-22 NOTE — TELEPHONE ENCOUNTER
Pt asked for a call back regarding her a changed Rx during the last appt  Her BP is through the roof  She has been feeling nevous and has increased heartrate  She can be reached at [phone]

## 2018-10-22 NOTE — TELEPHONE ENCOUNTER
BP med changes made at her last OV, she is now on amlodipine 5mg daily and HCTZ 12 5mg daily  Triamterene/hctz 37 5/25mg d/c'ed due to metabolic derangement  She picked up a bp cuff form her son yesterday and bp as 164/88  She repeated it one hour later and it was 155/98, was feeling nervous and had elevated HR  She has noted decreased urination as well  She is due for BMP tomorrow  Advised continuing to monitor her home bp and call with log in one week

## 2018-10-23 ENCOUNTER — APPOINTMENT (OUTPATIENT)
Dept: LAB | Age: 73
End: 2018-10-23
Payer: COMMERCIAL

## 2018-10-23 DIAGNOSIS — I10 BENIGN ESSENTIAL HYPERTENSION: ICD-10-CM

## 2018-10-23 DIAGNOSIS — E87.1 HYPONATREMIA: ICD-10-CM

## 2018-10-23 LAB
ANION GAP SERPL CALCULATED.3IONS-SCNC: 7 MMOL/L (ref 4–13)
BUN SERPL-MCNC: 13 MG/DL (ref 5–25)
CALCIUM SERPL-MCNC: 9.4 MG/DL (ref 8.3–10.1)
CHLORIDE SERPL-SCNC: 104 MMOL/L (ref 100–108)
CO2 SERPL-SCNC: 26 MMOL/L (ref 21–32)
CREAT SERPL-MCNC: 0.8 MG/DL (ref 0.6–1.3)
GFR SERPL CREATININE-BSD FRML MDRD: 74 ML/MIN/1.73SQ M
GLUCOSE P FAST SERPL-MCNC: 106 MG/DL (ref 65–99)
POTASSIUM SERPL-SCNC: 3.5 MMOL/L (ref 3.5–5.3)
SODIUM SERPL-SCNC: 137 MMOL/L (ref 136–145)

## 2018-10-23 PROCEDURE — 36415 COLL VENOUS BLD VENIPUNCTURE: CPT

## 2018-10-23 PROCEDURE — 80048 BASIC METABOLIC PNL TOTAL CA: CPT

## 2018-11-14 DIAGNOSIS — E87.1 HYPONATREMIA: ICD-10-CM

## 2018-11-14 RX ORDER — HYDROCHLOROTHIAZIDE 12.5 MG/1
12.5 CAPSULE, GELATIN COATED ORAL EVERY MORNING
Qty: 90 CAPSULE | Refills: 2 | Status: SHIPPED | OUTPATIENT
Start: 2018-11-14 | End: 2019-08-30 | Stop reason: SDUPTHER

## 2019-01-04 ENCOUNTER — TELEPHONE (OUTPATIENT)
Dept: INTERNAL MEDICINE CLINIC | Facility: CLINIC | Age: 74
End: 2019-01-04

## 2019-01-06 ENCOUNTER — TRANSCRIBE ORDERS (OUTPATIENT)
Dept: ADMINISTRATIVE | Age: 74
End: 2019-01-06

## 2019-01-06 ENCOUNTER — APPOINTMENT (OUTPATIENT)
Dept: LAB | Age: 74
End: 2019-01-06
Payer: COMMERCIAL

## 2019-01-06 DIAGNOSIS — D47.3 THROMBOCYTHEMIA, ESSENTIAL (HCC): Primary | ICD-10-CM

## 2019-01-06 DIAGNOSIS — D47.3 THROMBOCYTHEMIA, ESSENTIAL (HCC): ICD-10-CM

## 2019-01-06 LAB
ALBUMIN SERPL BCP-MCNC: 3.7 G/DL (ref 3.5–5)
ALP SERPL-CCNC: 93 U/L (ref 46–116)
ALT SERPL W P-5'-P-CCNC: 27 U/L (ref 12–78)
ANION GAP SERPL CALCULATED.3IONS-SCNC: 6 MMOL/L (ref 4–13)
AST SERPL W P-5'-P-CCNC: 16 U/L (ref 5–45)
BASOPHILS # BLD AUTO: 0.09 THOUSANDS/ΜL (ref 0–0.1)
BASOPHILS NFR BLD AUTO: 1 % (ref 0–1)
BILIRUB SERPL-MCNC: 0.53 MG/DL (ref 0.2–1)
BUN SERPL-MCNC: 14 MG/DL (ref 5–25)
CALCIUM SERPL-MCNC: 9.4 MG/DL (ref 8.3–10.1)
CHLORIDE SERPL-SCNC: 102 MMOL/L (ref 100–108)
CO2 SERPL-SCNC: 26 MMOL/L (ref 21–32)
CREAT SERPL-MCNC: 0.83 MG/DL (ref 0.6–1.3)
EOSINOPHIL # BLD AUTO: 0.21 THOUSAND/ΜL (ref 0–0.61)
EOSINOPHIL NFR BLD AUTO: 2 % (ref 0–6)
ERYTHROCYTE [DISTWIDTH] IN BLOOD BY AUTOMATED COUNT: 13.7 % (ref 11.6–15.1)
GFR SERPL CREATININE-BSD FRML MDRD: 71 ML/MIN/1.73SQ M
GLUCOSE P FAST SERPL-MCNC: 98 MG/DL (ref 65–99)
HCT VFR BLD AUTO: 46.3 % (ref 34.8–46.1)
HGB BLD-MCNC: 15.1 G/DL (ref 11.5–15.4)
IMM GRANULOCYTES # BLD AUTO: 0.03 THOUSAND/UL (ref 0–0.2)
IMM GRANULOCYTES NFR BLD AUTO: 0 % (ref 0–2)
LYMPHOCYTES # BLD AUTO: 1.49 THOUSANDS/ΜL (ref 0.6–4.47)
LYMPHOCYTES NFR BLD AUTO: 17 % (ref 14–44)
MCH RBC QN AUTO: 30 PG (ref 26.8–34.3)
MCHC RBC AUTO-ENTMCNC: 32.6 G/DL (ref 31.4–37.4)
MCV RBC AUTO: 92 FL (ref 82–98)
MONOCYTES # BLD AUTO: 0.77 THOUSAND/ΜL (ref 0.17–1.22)
MONOCYTES NFR BLD AUTO: 9 % (ref 4–12)
NEUTROPHILS # BLD AUTO: 6.39 THOUSANDS/ΜL (ref 1.85–7.62)
NEUTS SEG NFR BLD AUTO: 71 % (ref 43–75)
NRBC BLD AUTO-RTO: 0 /100 WBCS
PLATELET # BLD AUTO: 721 THOUSANDS/UL (ref 149–390)
PMV BLD AUTO: 9.5 FL (ref 8.9–12.7)
POTASSIUM SERPL-SCNC: 3.6 MMOL/L (ref 3.5–5.3)
PROT SERPL-MCNC: 7.6 G/DL (ref 6.4–8.2)
RBC # BLD AUTO: 5.03 MILLION/UL (ref 3.81–5.12)
SODIUM SERPL-SCNC: 134 MMOL/L (ref 136–145)
WBC # BLD AUTO: 8.98 THOUSAND/UL (ref 4.31–10.16)

## 2019-01-06 PROCEDURE — 80053 COMPREHEN METABOLIC PANEL: CPT

## 2019-01-06 PROCEDURE — 36415 COLL VENOUS BLD VENIPUNCTURE: CPT

## 2019-01-06 PROCEDURE — 85025 COMPLETE CBC W/AUTO DIFF WBC: CPT

## 2019-02-05 ENCOUNTER — OFFICE VISIT (OUTPATIENT)
Dept: HEMATOLOGY ONCOLOGY | Facility: CLINIC | Age: 74
End: 2019-02-05
Payer: COMMERCIAL

## 2019-02-05 VITALS
RESPIRATION RATE: 18 BRPM | HEART RATE: 98 BPM | TEMPERATURE: 96.8 F | OXYGEN SATURATION: 98 % | BODY MASS INDEX: 31.71 KG/M2 | DIASTOLIC BLOOD PRESSURE: 84 MMHG | HEIGHT: 63 IN | WEIGHT: 179 LBS | SYSTOLIC BLOOD PRESSURE: 150 MMHG

## 2019-02-05 DIAGNOSIS — D47.3 ESSENTIAL THROMBOCYTHEMIA (HCC): Primary | ICD-10-CM

## 2019-02-05 PROCEDURE — 99213 OFFICE O/P EST LOW 20 MIN: CPT | Performed by: INTERNAL MEDICINE

## 2019-02-05 NOTE — PROGRESS NOTES
Hematology Outpatient Follow - Up Note  Michelle Stewart 72 y o  female MRN: @ Encounter: [id number]        Date:  2/5/2019        Assessment/ Plan:    79-year-old  female with essential thrombocythemia positive for JAK2 mutation, current platelet count down to 721,000, continue aspirin 81 mg p o  Daily    The patient told me that she is taking 2-3 baby aspirin every day which is acceptable, no evidence of bleeding    Follow-up in 3 months with CBC and diff            HPI:  79-year-old  female who was seen initially 6/2017 regarding persistent thrombocytosis going back to at least March 2015  May 2017 WBC 9 3, hemoglobin 15 1, MCV 91, it gets 678,000, 69% neutrophils, 19% lymphocytes, 9% monocytes, 2% eosinophilsNovember 2016 WBC 9 6, hemoglobin 15 1, platelets 242,455   June 2016 platelets 149,654   March 2016 platelets 585,998   March 2015 platelets 515,264  She has a history of high blood pressure, hypothyroidism,  cholecystectomy in March 2016   She is a smoker - used to smoke half pack of cigarettes every day for 40 years quit in 2014   She does not drink alcohol   History negative for blood disorders of cancer   FADIA, rheumatoid factor normal   Ultrasound of the abdomen did not identify hepatosplenomegaly   DEAN 2 mutation present  Interval History:  No changes from the last visit       ECOG score 0       Test Results:    Imaging: No results found      Labs:   Lab Results   Component Value Date    WBC 8 98 01/06/2019    HGB 15 1 01/06/2019    HCT 46 3 (H) 01/06/2019    MCV 92 01/06/2019     (H) 01/06/2019     Lab Results   Component Value Date     (L) 12/29/2015    K 3 6 01/06/2019     01/06/2019    CO2 26 01/06/2019    ANIONGAP 13 12/29/2015    BUN 14 01/06/2019    CREATININE 0 83 01/06/2019    GLUCOSE 172 (H) 12/29/2015    GLUF 98 01/06/2019    CALCIUM 9 4 01/06/2019    AST 16 01/06/2019    ALT 27 01/06/2019    ALKPHOS 93 01/06/2019    PROT 7 9 09/05/2015    BILITOT 0 47 09/05/2015    EGFR 71 01/06/2019       No results found for: IRON, TIBC, FERRITIN    No results found for: [id number]      ROS:   Review of Systems   Constitutional: Negative for activity change, appetite change, diaphoresis, fatigue, fever and unexpected weight change  HENT: Negative for facial swelling, hearing loss, rhinorrhea, sinus pain, sinus pressure, sneezing, sore throat and tinnitus  Eyes: Negative for photophobia, pain, discharge, redness, itching and visual disturbance  Respiratory: Negative for apnea and chest tightness  Cardiovascular: Negative for chest pain, palpitations and leg swelling  Gastrointestinal: Negative for abdominal distention, abdominal pain, blood in stool, constipation, diarrhea, nausea, rectal pain and vomiting  Endocrine: Negative for cold intolerance, heat intolerance, polydipsia and polyphagia  Genitourinary: Negative for difficulty urinating, dyspareunia, frequency, hematuria, pelvic pain and urgency  Musculoskeletal: Negative for arthralgias, back pain, gait problem, joint swelling and myalgias  Skin: Negative for color change, pallor and rash  Allergic/Immunologic: Negative for environmental allergies and food allergies  Neurological: Negative for dizziness, tremors, seizures, syncope, speech difficulty, numbness and headaches  Hematological: Negative for adenopathy  Does not bruise/bleed easily  Psychiatric/Behavioral: Negative for agitation, confusion, dysphoric mood, hallucinations and suicidal ideas  Current Medications: Reviewed  Allergies: Reviewed  PMH/FH/SH:  Reviewed      Physical Exam:    Body surface area is 1 84 meters squared      Wt Readings from Last 3 Encounters:   02/05/19 81 2 kg (179 lb)   10/17/18 81 8 kg (180 lb 6 4 oz)   09/12/18 82 2 kg (181 lb 3 2 oz)        Temp Readings from Last 3 Encounters:   02/05/19 (!) 96 8 °F (36 °C) (Tympanic)   10/17/18 98 °F (36 7 °C)   09/12/18 97 9 °F (36 6 °C) (Tympanic)        BP Readings from Last 3 Encounters:   02/05/19 150/84   10/17/18 110/80   09/12/18 140/80         Pulse Readings from Last 3 Encounters:   02/05/19 98   10/17/18 98   09/12/18 75        Physical Exam   Constitutional: She is oriented to person, place, and time  She appears well-developed and well-nourished  No distress  HENT:   Head: Normocephalic and atraumatic  Mouth/Throat: Oropharynx is clear and moist  No oropharyngeal exudate  Eyes: Pupils are equal, round, and reactive to light  Conjunctivae and EOM are normal    Neck: Normal range of motion  Neck supple  No tracheal deviation present  No thyromegaly present  Cardiovascular: Normal rate and regular rhythm  Exam reveals no gallop and no friction rub  Murmur heard  Pulmonary/Chest: Effort normal and breath sounds normal  No respiratory distress  She has no wheezes  She has no rales  She exhibits no tenderness  Abdominal: Soft  Bowel sounds are normal  She exhibits no distension and no mass  There is no tenderness  There is no rebound and no guarding  Musculoskeletal: Normal range of motion  Lymphadenopathy:     She has no cervical adenopathy  Neurological: She is alert and oriented to person, place, and time  Skin: Skin is warm and dry  No rash noted  She is not diaphoretic  No erythema  No pallor  Psychiatric: She has a normal mood and affect  Her behavior is normal  Judgment and thought content normal    Vitals reviewed  Goals and Barriers:  Current Goal: Minimize effects of disease  Barriers: None  Patient's Capacity to Self Care:  Patient is able to self care      Code Status: [unfilled]

## 2019-02-14 ENCOUNTER — TRANSCRIBE ORDERS (OUTPATIENT)
Dept: ADMINISTRATIVE | Age: 74
End: 2019-02-14

## 2019-02-14 ENCOUNTER — APPOINTMENT (OUTPATIENT)
Dept: LAB | Age: 74
End: 2019-02-14
Payer: COMMERCIAL

## 2019-02-14 DIAGNOSIS — I10 BENIGN ESSENTIAL HYPERTENSION: ICD-10-CM

## 2019-02-14 LAB
ALBUMIN SERPL BCP-MCNC: 3.6 G/DL (ref 3.5–5)
ALP SERPL-CCNC: 98 U/L (ref 46–116)
ALT SERPL W P-5'-P-CCNC: 24 U/L (ref 12–78)
ANION GAP SERPL CALCULATED.3IONS-SCNC: 7 MMOL/L (ref 4–13)
AST SERPL W P-5'-P-CCNC: 17 U/L (ref 5–45)
BILIRUB SERPL-MCNC: 0.55 MG/DL (ref 0.2–1)
BUN SERPL-MCNC: 14 MG/DL (ref 5–25)
CALCIUM SERPL-MCNC: 9.6 MG/DL (ref 8.3–10.1)
CHLORIDE SERPL-SCNC: 104 MMOL/L (ref 100–108)
CO2 SERPL-SCNC: 26 MMOL/L (ref 21–32)
CREAT SERPL-MCNC: 0.83 MG/DL (ref 0.6–1.3)
GFR SERPL CREATININE-BSD FRML MDRD: 71 ML/MIN/1.73SQ M
GLUCOSE P FAST SERPL-MCNC: 99 MG/DL (ref 65–99)
POTASSIUM SERPL-SCNC: 3.7 MMOL/L (ref 3.5–5.3)
PROT SERPL-MCNC: 7.7 G/DL (ref 6.4–8.2)
SODIUM SERPL-SCNC: 137 MMOL/L (ref 136–145)

## 2019-02-14 PROCEDURE — 80053 COMPREHEN METABOLIC PANEL: CPT

## 2019-02-14 PROCEDURE — 36415 COLL VENOUS BLD VENIPUNCTURE: CPT

## 2019-02-17 NOTE — PROGRESS NOTES
Assessment/Plan:    Benign essential hypertension  Remains elevated on amlodipine 5mg daily and hctz 12 5mg daily, therefore will increase dose of amlodipine to 10mg daily and continue on hctz  She will call with bp readings in 3 weeks  She is asymptomatic  Hyponatremia  Has fluctuated but currently WNL  Will keep off triamterene     1  Benign essential hypertension  Lipid panel    TSH, 3rd generation with Free T4 reflex    Comprehensive metabolic panel   2  Essential thrombocythemia ([address] )      observation  Platelet counts slightly decreased  continue on ASA  3  Hyponatremia         Subjective:      Patient ID: Mckenna Nicholson is a 72 y o  female  HPI  79yo female with HTN, hypothyroidism, ET, prediabetes and fatty liver here for follow up care  Reports bp have remained high around 150/80s since dyazide was d/c'ed and placed on hctz 12 5mg, she has remained on amlodipine 5mg daily  She denies HA, dizziness, SOB, CP, LE edema  Triamterene was discontinued due to hyponatremia which has fluctuated but is currently normal     She remains on observation for ET  Reports she get hot flashes  Denies HA  The following portions of the patient's history were reviewed and updated as appropriate: allergies, current medications, past family history, past medical history, past social history, past surgical history and problem list     Current Outpatient Medications:     amLODIPine (NORVASC) 5 mg tablet, Take 1 tablet (5 mg total) by mouth daily, Disp: 90 tablet, Rfl: 1    aspirin 81 MG tablet, Take 81 mg by mouth daily  , Disp: , Rfl:     hydrochlorothiazide (MICROZIDE) 12 5 mg capsule, Take 1 capsule (12 5 mg total) by mouth every morning, Disp: 90 capsule, Rfl: 2    KLOR-CON M20 20 MEQ tablet, Take 1 tablet (20 mEq total) by mouth daily, Disp: 90 tablet, Rfl: 1    levothyroxine 125 mcg tablet, Take 1 tablet (125 mcg total) by mouth daily, Disp: 90 tablet, Rfl: 1    Multiple Vitamin (MULTIVITAMIN) capsule, Take 1 capsule by mouth daily  , Disp: , Rfl:     Review of Systems   Constitutional: Negative  HENT: Negative  Eyes:        Has cataracts   Respiratory: Negative  Cardiovascular: Negative  Gastrointestinal: Negative  Genitourinary: Negative  Neurological: Negative  Objective:    /90 (BP Location: Left arm, Patient Position: Sitting, Cuff Size: Large)   Pulse 101   Temp 98 1 °F (36 7 °C)   Resp 16   Ht 5' 3" (1 6 m)   Wt 82 1 kg (181 lb)   SpO2 98%   BMI 32 06 kg/m²      Physical Exam   Constitutional: She is oriented to person, place, and time  She appears well-developed and well-nourished  obese   HENT:   Mouth/Throat: Oropharynx is clear and moist    Neck: Neck supple  Cardiovascular: Normal rate, regular rhythm, normal heart sounds and intact distal pulses  Pulmonary/Chest: Effort normal and breath sounds normal  No stridor  No respiratory distress  Neurological: She is alert and oriented to person, place, and time  Vitals reviewed        Recent Results (from the past 336 hour(s))   Comprehensive metabolic panel    Collection Time: 02/14/19  9:36 AM   Result Value Ref Range    Sodium 137 136 - 145 mmol/L    Potassium 3 7 3 5 - 5 3 mmol/L    Chloride 104 100 - 108 mmol/L    CO2 26 21 - 32 mmol/L    ANION GAP 7 4 - 13 mmol/L    BUN 14 5 - 25 mg/dL    Creatinine 0 83 0 60 - 1 30 mg/dL    Glucose, Fasting 99 65 - 99 mg/dL    Calcium 9 6 8 3 - 10 1 mg/dL    AST 17 5 - 45 U/L    ALT 24 12 - 78 U/L    Alkaline Phosphatase 98 46 - 116 U/L    Total Protein 7 7 6 4 - 8 2 g/dL    Albumin 3 6 3 5 - 5 0 g/dL    Total Bilirubin 0 55 0 20 - 1 00 mg/dL    eGFR 71 ml/min/1 73sq m

## 2019-02-18 ENCOUNTER — OFFICE VISIT (OUTPATIENT)
Dept: INTERNAL MEDICINE CLINIC | Facility: CLINIC | Age: 74
End: 2019-02-18
Payer: COMMERCIAL

## 2019-02-18 VITALS
HEIGHT: 63 IN | SYSTOLIC BLOOD PRESSURE: 152 MMHG | DIASTOLIC BLOOD PRESSURE: 90 MMHG | TEMPERATURE: 98.1 F | OXYGEN SATURATION: 98 % | BODY MASS INDEX: 32.07 KG/M2 | WEIGHT: 181 LBS | HEART RATE: 101 BPM | RESPIRATION RATE: 16 BRPM

## 2019-02-18 DIAGNOSIS — D47.3 ESSENTIAL THROMBOCYTHEMIA (HCC): ICD-10-CM

## 2019-02-18 DIAGNOSIS — E87.1 HYPONATREMIA: ICD-10-CM

## 2019-02-18 DIAGNOSIS — I10 BENIGN ESSENTIAL HYPERTENSION: Primary | ICD-10-CM

## 2019-02-18 PROCEDURE — 99214 OFFICE O/P EST MOD 30 MIN: CPT | Performed by: INTERNAL MEDICINE

## 2019-02-18 PROCEDURE — 1036F TOBACCO NON-USER: CPT | Performed by: INTERNAL MEDICINE

## 2019-02-18 NOTE — ASSESSMENT & PLAN NOTE
Remains elevated on amlodipine 5mg daily and hctz 12 5mg daily, therefore will increase dose of amlodipine to 10mg daily and continue on hctz  She will call with bp readings in 3 weeks  She is asymptomatic

## 2019-02-28 DIAGNOSIS — E03.9 ACQUIRED HYPOTHYROIDISM: ICD-10-CM

## 2019-02-28 RX ORDER — LEVOTHYROXINE SODIUM 0.12 MG/1
125 TABLET ORAL DAILY
Qty: 90 TABLET | Refills: 3 | Status: SHIPPED | OUTPATIENT
Start: 2019-02-28 | End: 2020-03-10 | Stop reason: SDUPTHER

## 2019-03-11 DIAGNOSIS — I10 BENIGN HYPERTENSION: ICD-10-CM

## 2019-03-11 DIAGNOSIS — E87.6 HYPOKALEMIA: ICD-10-CM

## 2019-03-11 DIAGNOSIS — I10 BENIGN ESSENTIAL HYPERTENSION: ICD-10-CM

## 2019-03-11 RX ORDER — AMLODIPINE BESYLATE 5 MG/1
10 TABLET ORAL DAILY
Qty: 90 TABLET | Refills: 1 | Status: SHIPPED | OUTPATIENT
Start: 2019-03-11 | End: 2019-06-12 | Stop reason: SDUPTHER

## 2019-03-11 RX ORDER — POTASSIUM CHLORIDE 1500 MG/1
20 TABLET, EXTENDED RELEASE ORAL DAILY
Qty: 90 TABLET | Refills: 1 | Status: SHIPPED | OUTPATIENT
Start: 2019-03-11 | End: 2020-05-01 | Stop reason: SDUPTHER

## 2019-05-06 ENCOUNTER — TRANSCRIBE ORDERS (OUTPATIENT)
Dept: ADMINISTRATIVE | Age: 74
End: 2019-05-06

## 2019-05-06 ENCOUNTER — APPOINTMENT (OUTPATIENT)
Dept: LAB | Age: 74
End: 2019-05-06
Payer: COMMERCIAL

## 2019-05-06 DIAGNOSIS — D47.3 ESSENTIAL THROMBOCYTHEMIA (HCC): ICD-10-CM

## 2019-05-06 LAB
BASOPHILS # BLD AUTO: 0.14 THOUSANDS/ΜL (ref 0–0.1)
BASOPHILS NFR BLD AUTO: 1 % (ref 0–1)
EOSINOPHIL # BLD AUTO: 0.29 THOUSAND/ΜL (ref 0–0.61)
EOSINOPHIL NFR BLD AUTO: 3 % (ref 0–6)
ERYTHROCYTE [DISTWIDTH] IN BLOOD BY AUTOMATED COUNT: 14.8 % (ref 11.6–15.1)
HCT VFR BLD AUTO: 47.8 % (ref 34.8–46.1)
HGB BLD-MCNC: 15.3 G/DL (ref 11.5–15.4)
IMM GRANULOCYTES # BLD AUTO: 0.05 THOUSAND/UL (ref 0–0.2)
IMM GRANULOCYTES NFR BLD AUTO: 1 % (ref 0–2)
LYMPHOCYTES # BLD AUTO: 1.94 THOUSANDS/ΜL (ref 0.6–4.47)
LYMPHOCYTES NFR BLD AUTO: 19 % (ref 14–44)
MCH RBC QN AUTO: 29.9 PG (ref 26.8–34.3)
MCHC RBC AUTO-ENTMCNC: 32 G/DL (ref 31.4–37.4)
MCV RBC AUTO: 93 FL (ref 82–98)
MONOCYTES # BLD AUTO: 0.91 THOUSAND/ΜL (ref 0.17–1.22)
MONOCYTES NFR BLD AUTO: 9 % (ref 4–12)
NEUTROPHILS # BLD AUTO: 7.12 THOUSANDS/ΜL (ref 1.85–7.62)
NEUTS SEG NFR BLD AUTO: 67 % (ref 43–75)
NRBC BLD AUTO-RTO: 0 /100 WBCS
PLATELET # BLD AUTO: 790 THOUSANDS/UL (ref 149–390)
PMV BLD AUTO: 9.5 FL (ref 8.9–12.7)
RBC # BLD AUTO: 5.12 MILLION/UL (ref 3.81–5.12)
WBC # BLD AUTO: 10.45 THOUSAND/UL (ref 4.31–10.16)

## 2019-05-06 PROCEDURE — 85025 COMPLETE CBC W/AUTO DIFF WBC: CPT

## 2019-05-06 PROCEDURE — 36415 COLL VENOUS BLD VENIPUNCTURE: CPT

## 2019-05-08 ENCOUNTER — OFFICE VISIT (OUTPATIENT)
Dept: HEMATOLOGY ONCOLOGY | Facility: CLINIC | Age: 74
End: 2019-05-08
Payer: COMMERCIAL

## 2019-05-08 VITALS
WEIGHT: 180 LBS | OXYGEN SATURATION: 97 % | HEIGHT: 64 IN | DIASTOLIC BLOOD PRESSURE: 71 MMHG | SYSTOLIC BLOOD PRESSURE: 142 MMHG | HEART RATE: 87 BPM | BODY MASS INDEX: 30.73 KG/M2 | RESPIRATION RATE: 18 BRPM | TEMPERATURE: 97.9 F

## 2019-05-08 DIAGNOSIS — D47.3 ESSENTIAL THROMBOCYTHEMIA (HCC): Primary | ICD-10-CM

## 2019-05-08 PROCEDURE — 99213 OFFICE O/P EST LOW 20 MIN: CPT | Performed by: PHYSICIAN ASSISTANT

## 2019-06-12 DIAGNOSIS — I10 BENIGN HYPERTENSION: ICD-10-CM

## 2019-06-12 DIAGNOSIS — I10 BENIGN ESSENTIAL HYPERTENSION: ICD-10-CM

## 2019-06-12 RX ORDER — AMLODIPINE BESYLATE 5 MG/1
10 TABLET ORAL DAILY
Qty: 90 TABLET | Refills: 1 | Status: SHIPPED | OUTPATIENT
Start: 2019-06-12 | End: 2019-09-24 | Stop reason: SDUPTHER

## 2019-06-19 ENCOUNTER — APPOINTMENT (OUTPATIENT)
Dept: LAB | Age: 74
End: 2019-06-19
Payer: COMMERCIAL

## 2019-06-19 ENCOUNTER — TRANSCRIBE ORDERS (OUTPATIENT)
Dept: ADMINISTRATIVE | Age: 74
End: 2019-06-19

## 2019-06-19 DIAGNOSIS — I10 BENIGN ESSENTIAL HYPERTENSION: ICD-10-CM

## 2019-06-19 LAB
ALBUMIN SERPL BCP-MCNC: 3.8 G/DL (ref 3.5–5)
ALP SERPL-CCNC: 102 U/L (ref 46–116)
ALT SERPL W P-5'-P-CCNC: 25 U/L (ref 12–78)
ANION GAP SERPL CALCULATED.3IONS-SCNC: 4 MMOL/L (ref 4–13)
AST SERPL W P-5'-P-CCNC: 14 U/L (ref 5–45)
BILIRUB SERPL-MCNC: 0.44 MG/DL (ref 0.2–1)
BUN SERPL-MCNC: 16 MG/DL (ref 5–25)
CALCIUM SERPL-MCNC: 9.8 MG/DL (ref 8.3–10.1)
CHLORIDE SERPL-SCNC: 105 MMOL/L (ref 100–108)
CHOLEST SERPL-MCNC: 179 MG/DL (ref 50–200)
CO2 SERPL-SCNC: 28 MMOL/L (ref 21–32)
CREAT SERPL-MCNC: 0.74 MG/DL (ref 0.6–1.3)
GFR SERPL CREATININE-BSD FRML MDRD: 81 ML/MIN/1.73SQ M
GLUCOSE P FAST SERPL-MCNC: 97 MG/DL (ref 65–99)
HDLC SERPL-MCNC: 41 MG/DL (ref 40–60)
LDLC SERPL CALC-MCNC: 116 MG/DL (ref 0–100)
NONHDLC SERPL-MCNC: 138 MG/DL
POTASSIUM SERPL-SCNC: 3.7 MMOL/L (ref 3.5–5.3)
PROT SERPL-MCNC: 8 G/DL (ref 6.4–8.2)
SODIUM SERPL-SCNC: 137 MMOL/L (ref 136–145)
TRIGL SERPL-MCNC: 111 MG/DL
TSH SERPL DL<=0.05 MIU/L-ACNC: 0.45 UIU/ML (ref 0.36–3.74)

## 2019-06-19 PROCEDURE — 80061 LIPID PANEL: CPT

## 2019-06-19 PROCEDURE — 84443 ASSAY THYROID STIM HORMONE: CPT

## 2019-06-19 PROCEDURE — 80053 COMPREHEN METABOLIC PANEL: CPT

## 2019-06-19 PROCEDURE — 36415 COLL VENOUS BLD VENIPUNCTURE: CPT

## 2019-06-21 ENCOUNTER — OFFICE VISIT (OUTPATIENT)
Dept: INTERNAL MEDICINE CLINIC | Facility: CLINIC | Age: 74
End: 2019-06-21
Payer: COMMERCIAL

## 2019-06-21 VITALS
SYSTOLIC BLOOD PRESSURE: 140 MMHG | TEMPERATURE: 98.8 F | OXYGEN SATURATION: 98 % | BODY MASS INDEX: 30.9 KG/M2 | DIASTOLIC BLOOD PRESSURE: 82 MMHG | HEIGHT: 64 IN | HEART RATE: 92 BPM | WEIGHT: 181 LBS

## 2019-06-21 DIAGNOSIS — Z00.00 MEDICARE ANNUAL WELLNESS VISIT, SUBSEQUENT: ICD-10-CM

## 2019-06-21 DIAGNOSIS — E66.9 OBESITY (BMI 30.0-34.9): ICD-10-CM

## 2019-06-21 DIAGNOSIS — E03.9 ACQUIRED HYPOTHYROIDISM: ICD-10-CM

## 2019-06-21 DIAGNOSIS — D47.3 ESSENTIAL THROMBOCYTHEMIA (HCC): ICD-10-CM

## 2019-06-21 DIAGNOSIS — I10 BENIGN ESSENTIAL HYPERTENSION: Primary | ICD-10-CM

## 2019-06-21 PROBLEM — E66.811 OBESITY (BMI 30.0-34.9): Status: ACTIVE | Noted: 2019-06-21

## 2019-06-21 PROBLEM — E87.1 HYPONATREMIA: Status: RESOLVED | Noted: 2018-10-17 | Resolved: 2019-06-21

## 2019-06-21 PROCEDURE — 1036F TOBACCO NON-USER: CPT | Performed by: INTERNAL MEDICINE

## 2019-06-21 PROCEDURE — 3008F BODY MASS INDEX DOCD: CPT | Performed by: INTERNAL MEDICINE

## 2019-06-21 PROCEDURE — 1125F AMNT PAIN NOTED PAIN PRSNT: CPT | Performed by: INTERNAL MEDICINE

## 2019-06-21 PROCEDURE — 1160F RVW MEDS BY RX/DR IN RCRD: CPT | Performed by: INTERNAL MEDICINE

## 2019-06-21 PROCEDURE — 99214 OFFICE O/P EST MOD 30 MIN: CPT | Performed by: INTERNAL MEDICINE

## 2019-06-21 PROCEDURE — G0439 PPPS, SUBSEQ VISIT: HCPCS | Performed by: INTERNAL MEDICINE

## 2019-06-21 PROCEDURE — 1170F FXNL STATUS ASSESSED: CPT | Performed by: INTERNAL MEDICINE

## 2019-08-19 ENCOUNTER — APPOINTMENT (OUTPATIENT)
Dept: LAB | Age: 74
End: 2019-08-19
Payer: COMMERCIAL

## 2019-08-19 DIAGNOSIS — D47.3 ESSENTIAL THROMBOCYTHEMIA (HCC): ICD-10-CM

## 2019-08-19 LAB
BASOPHILS # BLD AUTO: 0.11 THOUSANDS/ΜL (ref 0–0.1)
BASOPHILS NFR BLD AUTO: 1 % (ref 0–1)
EOSINOPHIL # BLD AUTO: 0.3 THOUSAND/ΜL (ref 0–0.61)
EOSINOPHIL NFR BLD AUTO: 3 % (ref 0–6)
ERYTHROCYTE [DISTWIDTH] IN BLOOD BY AUTOMATED COUNT: 14.2 % (ref 11.6–15.1)
HCT VFR BLD AUTO: 47.2 % (ref 34.8–46.1)
HGB BLD-MCNC: 15.5 G/DL (ref 11.5–15.4)
IMM GRANULOCYTES # BLD AUTO: 0.04 THOUSAND/UL (ref 0–0.2)
IMM GRANULOCYTES NFR BLD AUTO: 0 % (ref 0–2)
LYMPHOCYTES # BLD AUTO: 2.3 THOUSANDS/ΜL (ref 0.6–4.47)
LYMPHOCYTES NFR BLD AUTO: 24 % (ref 14–44)
MCH RBC QN AUTO: 29.9 PG (ref 26.8–34.3)
MCHC RBC AUTO-ENTMCNC: 32.8 G/DL (ref 31.4–37.4)
MCV RBC AUTO: 91 FL (ref 82–98)
MONOCYTES # BLD AUTO: 0.96 THOUSAND/ΜL (ref 0.17–1.22)
MONOCYTES NFR BLD AUTO: 10 % (ref 4–12)
NEUTROPHILS # BLD AUTO: 6.03 THOUSANDS/ΜL (ref 1.85–7.62)
NEUTS SEG NFR BLD AUTO: 62 % (ref 43–75)
NRBC BLD AUTO-RTO: 0 /100 WBCS
PLATELET # BLD AUTO: 782 THOUSANDS/UL (ref 149–390)
PMV BLD AUTO: 9.6 FL (ref 8.9–12.7)
RBC # BLD AUTO: 5.18 MILLION/UL (ref 3.81–5.12)
WBC # BLD AUTO: 9.74 THOUSAND/UL (ref 4.31–10.16)

## 2019-08-19 PROCEDURE — 85025 COMPLETE CBC W/AUTO DIFF WBC: CPT

## 2019-08-19 PROCEDURE — 36415 COLL VENOUS BLD VENIPUNCTURE: CPT

## 2019-08-21 ENCOUNTER — OFFICE VISIT (OUTPATIENT)
Dept: HEMATOLOGY ONCOLOGY | Facility: CLINIC | Age: 74
End: 2019-08-21
Payer: COMMERCIAL

## 2019-08-21 VITALS
TEMPERATURE: 97.7 F | BODY MASS INDEX: 31.71 KG/M2 | HEIGHT: 63 IN | SYSTOLIC BLOOD PRESSURE: 146 MMHG | DIASTOLIC BLOOD PRESSURE: 80 MMHG | RESPIRATION RATE: 14 BRPM | HEART RATE: 79 BPM | WEIGHT: 179 LBS

## 2019-08-21 DIAGNOSIS — D47.3 ESSENTIAL THROMBOCYTHEMIA (HCC): Primary | ICD-10-CM

## 2019-08-21 PROCEDURE — 99213 OFFICE O/P EST LOW 20 MIN: CPT | Performed by: PHYSICIAN ASSISTANT

## 2019-08-21 NOTE — PROGRESS NOTES
Hematology/Oncology Outpatient Follow- up Note  Cassy Lester 69 y o  female MRN: @ Encounter: [id number]        Date:  8/21/2019      Assessment / Plan:    62-year-old  female with essential thrombocythemia, positive for JAK2 mutation, current platelet count  825,952, stable      The patient mentioned that she is taking 2-3 baby aspirin every day which is acceptable, no evidence of bleeding  Will continue with observation  Intermittent insomnia  We discussed sleep hygiene strategies      Follow-up in 3-4 months with CBC and diff               HPI:  72year-old  female who was seen initially 6/2017 regarding persistent thrombocytosis going back to at least March 2015  May 2017 WBC 9 3, hemoglobin 15 1, MCV 91, it gets 678,000, 69% neutrophils, 19% lymphocytes, 9% monocytes, 2% eosinophilsNovember 2016 WBC 9 6, hemoglobin 15 1, platelets 793,345   June 2016 platelets 654,800   March 2016 platelets 364,762   March 2015 platelets 410,182  She has a history of high blood pressure, hypothyroidism,  cholecystectomy in March 2016   She is a smoker - used to smoke half pack of cigarettes every day for 40 years quit in 2014   She does not drink alcohol   History negative for blood disorders of cancer   FADIA, rheumatoid factor normal   Ultrasound of the abdomen did not identify hepatosplenomegaly   DEAN 2 mutation present         Interval History:  Feels well  Occassional insomnia episodes              Test Results:        Labs:   Lab Results   Component Value Date    HGB 15 5 (H) 08/19/2019    HCT 47 2 (H) 08/19/2019    MCV 91 08/19/2019     (H) 08/19/2019    WBC 9 74 08/19/2019    NRBC 0 08/19/2019     Lab Results   Component Value Date     (L) 12/29/2015    K 3 7 06/19/2019     06/19/2019    CO2 28 06/19/2019    ANIONGAP 13 12/29/2015    BUN 16 06/19/2019    CREATININE 0 74 06/19/2019    GLUCOSE 172 (H) 12/29/2015    GLUF 97 06/19/2019    CALCIUM 9 8 06/19/2019    AST 14 2019    ALT 25 2019    ALKPHOS 102 2019    PROT 7 9 2015    BILITOT 0 47 2015    EGFR 81 2019       Imaging: No results found  ROS:  As mentioned in HPI & Interval History otherwise 14 point ROS negative  Allergies: No Known Allergies  Current Medications: Reviewed  PMH/FH/SH:  Reviewed      Physical Exam:    There is no height or weight on file to calculate BSA  Ht Readings from Last 3 Encounters:   19 5' 3 58" (1 615 m)   19 5' 3 58" (1 615 m)   19 5' 3" (1 6 m)        Wt Readings from Last 3 Encounters:   19 82 1 kg (181 lb)   19 81 6 kg (180 lb)   19 82 1 kg (181 lb)        Temp Readings from Last 3 Encounters:   19 98 8 °F (37 1 °C) (Tympanic)   19 97 9 °F (36 6 °C) (Tympanic)   19 98 1 °F (36 7 °C)        BP Readings from Last 3 Encounters:   19 140/82   19 142/71   19 152/90           Physical Exam   Constitutional: She is oriented to person, place, and time  She appears well-developed and well-nourished  No distress  HENT:   Head: Normocephalic and atraumatic  Eyes: Conjunctivae are normal    Neck: Normal range of motion  Neck supple  No tracheal deviation present  Cardiovascular: Normal rate and regular rhythm  Exam reveals no gallop and no friction rub  No murmur heard  Pulmonary/Chest: Effort normal and breath sounds normal  No respiratory distress  She has no wheezes  She has no rales  She exhibits no tenderness  Abdominal: Soft  She exhibits no distension  There is no tenderness  Lymphadenopathy:     She has no cervical adenopathy  Neurological: She is alert and oriented to person, place, and time  Skin: Skin is warm and dry  She is not diaphoretic  No erythema  No pallor  Psychiatric: She has a normal mood and affect  Her behavior is normal  Judgment and thought content normal    Vitals reviewed        ECO      Emergency Contacts:    Subhash Londono [phone],

## 2019-08-30 DIAGNOSIS — E87.1 HYPONATREMIA: ICD-10-CM

## 2019-08-30 RX ORDER — HYDROCHLOROTHIAZIDE 12.5 MG/1
12.5 CAPSULE, GELATIN COATED ORAL EVERY MORNING
Qty: 90 CAPSULE | Refills: 2 | Status: SHIPPED | OUTPATIENT
Start: 2019-08-30 | End: 2020-06-23 | Stop reason: SDUPTHER

## 2019-09-24 DIAGNOSIS — I10 BENIGN ESSENTIAL HYPERTENSION: ICD-10-CM

## 2019-09-24 DIAGNOSIS — I10 BENIGN HYPERTENSION: ICD-10-CM

## 2019-09-24 RX ORDER — AMLODIPINE BESYLATE 5 MG/1
10 TABLET ORAL DAILY
Qty: 90 TABLET | Refills: 1 | Status: SHIPPED | OUTPATIENT
Start: 2019-09-24 | End: 2019-11-21 | Stop reason: SDUPTHER

## 2019-11-21 DIAGNOSIS — I10 BENIGN HYPERTENSION: ICD-10-CM

## 2019-11-21 DIAGNOSIS — I10 BENIGN ESSENTIAL HYPERTENSION: ICD-10-CM

## 2019-11-21 RX ORDER — AMLODIPINE BESYLATE 5 MG/1
10 TABLET ORAL DAILY
Qty: 90 TABLET | Refills: 1 | Status: SHIPPED | OUTPATIENT
Start: 2019-11-21 | End: 2020-02-25 | Stop reason: SDUPTHER

## 2019-12-15 ENCOUNTER — APPOINTMENT (OUTPATIENT)
Dept: LAB | Age: 74
End: 2019-12-15
Payer: COMMERCIAL

## 2019-12-15 DIAGNOSIS — E03.9 ACQUIRED HYPOTHYROIDISM: ICD-10-CM

## 2019-12-15 DIAGNOSIS — I10 BENIGN ESSENTIAL HYPERTENSION: ICD-10-CM

## 2019-12-15 LAB
ALBUMIN SERPL BCP-MCNC: 3.9 G/DL (ref 3.5–5)
ALP SERPL-CCNC: 104 U/L (ref 46–116)
ALT SERPL W P-5'-P-CCNC: 27 U/L (ref 12–78)
ANION GAP SERPL CALCULATED.3IONS-SCNC: 4 MMOL/L (ref 4–13)
AST SERPL W P-5'-P-CCNC: 17 U/L (ref 5–45)
BILIRUB SERPL-MCNC: 0.64 MG/DL (ref 0.2–1)
BILIRUB UR QL STRIP: NEGATIVE
BUN SERPL-MCNC: 16 MG/DL (ref 5–25)
CALCIUM ALBUM COR SERPL-MCNC: 10.3 MG/DL (ref 8.3–10.1)
CALCIUM SERPL-MCNC: 10.2 MG/DL (ref 8.3–10.1)
CHLORIDE SERPL-SCNC: 102 MMOL/L (ref 100–108)
CLARITY UR: CLEAR
CO2 SERPL-SCNC: 28 MMOL/L (ref 21–32)
COLOR UR: YELLOW
CREAT SERPL-MCNC: 0.82 MG/DL (ref 0.6–1.3)
GFR SERPL CREATININE-BSD FRML MDRD: 72 ML/MIN/1.73SQ M
GLUCOSE SERPL-MCNC: 99 MG/DL (ref 65–140)
GLUCOSE UR STRIP-MCNC: NEGATIVE MG/DL
HGB UR QL STRIP.AUTO: NEGATIVE
KETONES UR STRIP-MCNC: NEGATIVE MG/DL
LEUKOCYTE ESTERASE UR QL STRIP: NEGATIVE
NITRITE UR QL STRIP: NEGATIVE
PH UR STRIP.AUTO: 7 [PH]
POTASSIUM SERPL-SCNC: 3.5 MMOL/L (ref 3.5–5.3)
PROT SERPL-MCNC: 7.9 G/DL (ref 6.4–8.2)
PROT UR STRIP-MCNC: NEGATIVE MG/DL
SODIUM SERPL-SCNC: 134 MMOL/L (ref 136–145)
SP GR UR STRIP.AUTO: 1 (ref 1–1.03)
TSH SERPL DL<=0.05 MIU/L-ACNC: 1.03 UIU/ML (ref 0.36–3.74)
UROBILINOGEN UR QL STRIP.AUTO: 0.2 E.U./DL

## 2019-12-15 PROCEDURE — 36415 COLL VENOUS BLD VENIPUNCTURE: CPT

## 2019-12-15 PROCEDURE — 84443 ASSAY THYROID STIM HORMONE: CPT

## 2019-12-15 PROCEDURE — 81003 URINALYSIS AUTO W/O SCOPE: CPT

## 2019-12-15 PROCEDURE — 80053 COMPREHEN METABOLIC PANEL: CPT

## 2019-12-29 NOTE — PROGRESS NOTES
Assessment/Plan:    Problem List Items Addressed This Visit        Endocrine    Hypothyroidism     TSH WNL  Continue on levothyroxine 125mcg daily            Cardiovascular and Mediastinum    Benign essential hypertension - Primary     Elevated  At this time, will start lisinopril 20mg daily, recheck BMP in one week  Side effects discussed  She is to continue hctz 12 5mg for now and amlodipine 5mg two tabs daily  Return in one month with BP log for review  Reinforced low sodium diet  Relevant Medications    lisinopril (ZESTRIL) 20 mg tablet    Other Relevant Orders    Basic metabolic panel       Other    Insomnia     Difficulty falling and staying asleep  Can try melatonin up to 10mg qHS prn  If not effective can discuss trial of trazodone  Hypercalcemia     Mild  Advised to decrease dietary consumption of dairy products, repeat BMP  If calcium is still elevated, will need to hold off on hctz for one month and re-eval levels  Relevant Orders    Basic metabolic panel      Other Visit Diagnoses     Screening for breast cancer        Relevant Orders    Mammo screening bilateral w 3d & cad    Need for influenza vaccination        Relevant Orders    influenza vaccine, [id number], high-dose, PF 0 5 mL (FLUZONE HIGH-DOSE) (Completed)          Subjective:      Patient ID: Ayde Scanlon is a 69 y o  female  69yo female with hypothyroidism, HTN, ET, prediabetes, fatty liver here for follow up care  Hypertension   This is a chronic problem  The current episode started more than 1 year ago  Risk factors for coronary artery disease include sedentary lifestyle, obesity and post-menopausal state  Past treatments include calcium channel blockers and diuretics  Identifiable causes of hypertension include a thyroid problem  Thyroid Problem   Presents for follow-up visit  Symptoms include weight loss  Patient reports no anxiety  The symptoms have been stable       Reports she has been eating a lot of oatmeal and putting milk in it  She also drinks coffee and puts milk in it  She reports difficulty falling and staying asleep  She gets 7hrs of sleep but when she gets up to urinate she is unable to fall back to sleep  She denies fatigue in the morning  She is unable to take a nap in the afternoon  The following portions of the patient's history were reviewed and updated as appropriate: allergies, current medications, past family history, past medical history, past social history, past surgical history and problem list     Current Outpatient Medications:     amLODIPine (NORVASC) 5 mg tablet, Take 2 tablets (10 mg total) by mouth daily, Disp: 90 tablet, Rfl: 1    aspirin 81 MG tablet, Take 81 mg by mouth daily  , Disp: , Rfl:     hydrochlorothiazide (MICROZIDE) 12 5 mg capsule, Take 1 capsule (12 5 mg total) by mouth every morning, Disp: 90 capsule, Rfl: 2    KLOR-CON M20 20 MEQ tablet, Take 1 tablet (20 mEq total) by mouth daily (Patient taking differently: Take 10 mEq by mouth daily ), Disp: 90 tablet, Rfl: 1    levothyroxine 125 mcg tablet, Take 1 tablet (125 mcg total) by mouth daily, Disp: 90 tablet, Rfl: 3    Multiple Vitamin (MULTIVITAMIN) capsule, Take 1 capsule by mouth daily  , Disp: , Rfl:     lisinopril (ZESTRIL) 20 mg tablet, Take 1 tablet (20 mg total) by mouth daily, Disp: 30 tablet, Rfl: 0    Review of Systems   Constitutional: Positive for weight loss  +weight loss   HENT: Negative  Respiratory: Negative  Cardiovascular: Negative  Gastrointestinal: Negative  Genitourinary: Negative  Neurological: Negative  Psychiatric/Behavioral: Positive for sleep disturbance  Negative for dysphoric mood  The patient is not nervous/anxious  Objective:    /90   Pulse 88   Temp 98 °F (36 7 °C)   Resp 16   Ht 5' 3" (1 6 m)   Wt 79 4 kg (175 lb)   SpO2 98%   BMI 31 00 kg/m²      Physical Exam   Constitutional: She appears well-developed and well-nourished  HENT:   Mouth/Throat: Oropharynx is clear and moist    Neck: Neck supple  Cardiovascular: Normal rate, regular rhythm, normal heart sounds and intact distal pulses  Pulmonary/Chest: Effort normal and breath sounds normal  No stridor  No respiratory distress  Neurological: She is alert  Psychiatric: She has a normal mood and affect  Her behavior is normal    Vitals reviewed        Recent Results (from the past 840 hour(s))   Comprehensive metabolic panel    Collection Time: 12/15/19  8:47 AM   Result Value Ref Range    Sodium 134 (L) 136 - 145 mmol/L    Potassium 3 5 3 5 - 5 3 mmol/L    Chloride 102 100 - 108 mmol/L    CO2 28 21 - 32 mmol/L    ANION GAP 4 4 - 13 mmol/L    BUN 16 5 - 25 mg/dL    Creatinine 0 82 0 60 - 1 30 mg/dL    Glucose 99 65 - 140 mg/dL    Calcium 10 2 (H) 8 3 - 10 1 mg/dL    Corrected Calcium 10 3 (H) 8 3 - 10 1 mg/dL    AST 17 5 - 45 U/L    ALT 27 12 - 78 U/L    Alkaline Phosphatase 104 46 - 116 U/L    Total Protein 7 9 6 4 - 8 2 g/dL    Albumin 3 9 3 5 - 5 0 g/dL    Total Bilirubin 0 64 0 20 - 1 00 mg/dL    eGFR 72 ml/min/1 73sq m   TSH, 3rd generation with Free T4 reflex    Collection Time: 12/15/19  8:47 AM   Result Value Ref Range    TSH 3RD GENERATON 1 030 0 358 - 3 740 uIU/mL   Urinalysis with reflex to microscopic    Collection Time: 12/15/19  8:47 AM   Result Value Ref Range    Color, UA Yellow     Clarity, UA Clear     Specific Coin, UA 1 003 1 003 - 1 030    pH, UA 7 0 4 5, 5 0, 5 5, 6 0, 6 5, 7 0, 7 5, 8 0    Leukocytes, UA Negative Negative    Nitrite, UA Negative Negative    Protein, UA Negative Negative mg/dl    Glucose, UA Negative Negative mg/dl    Ketones, UA Negative Negative mg/dl    Urobilinogen, UA 0 2 0 2, 1 0 E U /dl E U /dl    Bilirubin, UA Negative Negative    Blood, UA Negative Negative

## 2019-12-30 ENCOUNTER — OFFICE VISIT (OUTPATIENT)
Dept: INTERNAL MEDICINE CLINIC | Facility: CLINIC | Age: 74
End: 2019-12-30
Payer: COMMERCIAL

## 2019-12-30 VITALS
OXYGEN SATURATION: 98 % | WEIGHT: 175 LBS | HEART RATE: 88 BPM | BODY MASS INDEX: 31.01 KG/M2 | SYSTOLIC BLOOD PRESSURE: 142 MMHG | TEMPERATURE: 98 F | HEIGHT: 63 IN | RESPIRATION RATE: 16 BRPM | DIASTOLIC BLOOD PRESSURE: 90 MMHG

## 2019-12-30 DIAGNOSIS — Z12.39 SCREENING FOR BREAST CANCER: ICD-10-CM

## 2019-12-30 DIAGNOSIS — I10 BENIGN ESSENTIAL HYPERTENSION: Primary | ICD-10-CM

## 2019-12-30 DIAGNOSIS — Z23 NEED FOR INFLUENZA VACCINATION: ICD-10-CM

## 2019-12-30 DIAGNOSIS — E03.9 ACQUIRED HYPOTHYROIDISM: ICD-10-CM

## 2019-12-30 DIAGNOSIS — G47.00 INSOMNIA, UNSPECIFIED TYPE: ICD-10-CM

## 2019-12-30 DIAGNOSIS — E83.52 HYPERCALCEMIA: ICD-10-CM

## 2019-12-30 PROCEDURE — G0008 ADMIN INFLUENZA VIRUS VAC: HCPCS

## 2019-12-30 PROCEDURE — 1036F TOBACCO NON-USER: CPT | Performed by: INTERNAL MEDICINE

## 2019-12-30 PROCEDURE — 90662 IIV NO PRSV INCREASED AG IM: CPT

## 2019-12-30 PROCEDURE — 99214 OFFICE O/P EST MOD 30 MIN: CPT | Performed by: INTERNAL MEDICINE

## 2019-12-30 PROCEDURE — 3008F BODY MASS INDEX DOCD: CPT | Performed by: INTERNAL MEDICINE

## 2019-12-30 PROCEDURE — 1160F RVW MEDS BY RX/DR IN RCRD: CPT

## 2019-12-30 RX ORDER — LISINOPRIL 20 MG/1
20 TABLET ORAL DAILY
Qty: 30 TABLET | Refills: 0 | Status: SHIPPED | OUTPATIENT
Start: 2019-12-30 | End: 2020-01-27 | Stop reason: SDUPTHER

## 2019-12-30 NOTE — ASSESSMENT & PLAN NOTE
Difficulty falling and staying asleep  Can try melatonin up to 10mg qHS prn  If not effective can discuss trial of trazodone

## 2019-12-30 NOTE — ASSESSMENT & PLAN NOTE
Mild   Advised to decrease dietary consumption of dairy products, repeat BMP  If calcium is still elevated, will need to hold off on hctz for one month and re-eval levels

## 2019-12-30 NOTE — ASSESSMENT & PLAN NOTE
Elevated  At this time, will start lisinopril 20mg daily, recheck BMP in one week  Side effects discussed  She is to continue hctz 12 5mg for now and amlodipine 5mg two tabs daily  Return in one month with BP log for review  Reinforced low sodium diet

## 2020-01-07 ENCOUNTER — APPOINTMENT (OUTPATIENT)
Dept: LAB | Age: 75
End: 2020-01-07
Payer: COMMERCIAL

## 2020-01-07 DIAGNOSIS — I10 BENIGN ESSENTIAL HYPERTENSION: ICD-10-CM

## 2020-01-07 DIAGNOSIS — E83.52 HYPERCALCEMIA: ICD-10-CM

## 2020-01-07 DIAGNOSIS — D47.3 ESSENTIAL THROMBOCYTHEMIA (HCC): ICD-10-CM

## 2020-01-07 LAB
ANION GAP SERPL CALCULATED.3IONS-SCNC: 6 MMOL/L (ref 4–13)
BASOPHILS # BLD AUTO: 0.1 THOUSANDS/ΜL (ref 0–0.1)
BASOPHILS NFR BLD AUTO: 1 % (ref 0–1)
BUN SERPL-MCNC: 13 MG/DL (ref 5–25)
CALCIUM SERPL-MCNC: 9.9 MG/DL (ref 8.3–10.1)
CHLORIDE SERPL-SCNC: 107 MMOL/L (ref 100–108)
CO2 SERPL-SCNC: 27 MMOL/L (ref 21–32)
CREAT SERPL-MCNC: 0.86 MG/DL (ref 0.6–1.3)
EOSINOPHIL # BLD AUTO: 0.21 THOUSAND/ΜL (ref 0–0.61)
EOSINOPHIL NFR BLD AUTO: 2 % (ref 0–6)
ERYTHROCYTE [DISTWIDTH] IN BLOOD BY AUTOMATED COUNT: 14.6 % (ref 11.6–15.1)
GFR SERPL CREATININE-BSD FRML MDRD: 68 ML/MIN/1.73SQ M
GLUCOSE P FAST SERPL-MCNC: 105 MG/DL (ref 65–99)
HCT VFR BLD AUTO: 48.8 % (ref 34.8–46.1)
HGB BLD-MCNC: 16.2 G/DL (ref 11.5–15.4)
IMM GRANULOCYTES # BLD AUTO: 0.03 THOUSAND/UL (ref 0–0.2)
IMM GRANULOCYTES NFR BLD AUTO: 0 % (ref 0–2)
LYMPHOCYTES # BLD AUTO: 1.85 THOUSANDS/ΜL (ref 0.6–4.47)
LYMPHOCYTES NFR BLD AUTO: 20 % (ref 14–44)
MCH RBC QN AUTO: 30.2 PG (ref 26.8–34.3)
MCHC RBC AUTO-ENTMCNC: 33.2 G/DL (ref 31.4–37.4)
MCV RBC AUTO: 91 FL (ref 82–98)
MONOCYTES # BLD AUTO: 0.67 THOUSAND/ΜL (ref 0.17–1.22)
MONOCYTES NFR BLD AUTO: 7 % (ref 4–12)
NEUTROPHILS # BLD AUTO: 6.37 THOUSANDS/ΜL (ref 1.85–7.62)
NEUTS SEG NFR BLD AUTO: 70 % (ref 43–75)
NRBC BLD AUTO-RTO: 0 /100 WBCS
PLATELET # BLD AUTO: 826 THOUSANDS/UL (ref 149–390)
PMV BLD AUTO: 9.7 FL (ref 8.9–12.7)
POTASSIUM SERPL-SCNC: 3.5 MMOL/L (ref 3.5–5.3)
RBC # BLD AUTO: 5.36 MILLION/UL (ref 3.81–5.12)
SODIUM SERPL-SCNC: 140 MMOL/L (ref 136–145)
WBC # BLD AUTO: 9.23 THOUSAND/UL (ref 4.31–10.16)

## 2020-01-07 PROCEDURE — 80048 BASIC METABOLIC PNL TOTAL CA: CPT

## 2020-01-07 PROCEDURE — 85025 COMPLETE CBC W/AUTO DIFF WBC: CPT

## 2020-01-07 PROCEDURE — 36415 COLL VENOUS BLD VENIPUNCTURE: CPT

## 2020-01-25 DIAGNOSIS — Z12.39 SCREENING FOR MALIGNANT NEOPLASM OF BREAST: ICD-10-CM

## 2020-01-26 NOTE — PROGRESS NOTES
Assessment/Plan:    Problem List Items Addressed This Visit        Cardiovascular and Mediastinum    Benign essential hypertension - Primary     Improved with addition of lisinopril 20mg daily, med renewed  She is also to continue on hctz 12 5mg daily and amlodipine 5m two tabs daily  Monitor BMP         Relevant Medications    lisinopril (ZESTRIL) 20 mg tablet    Other Relevant Orders    Comprehensive metabolic panel    TSH, 3rd generation with Free T4 reflex    Lipid panel    UA (URINE) with reflex to Scope       Other    Insomnia     Overall is patient is getting 6+ hrs of sleep  She is encouraged to try melatonin up to 10mg qHS prn         Hypercalcemia     Resolved with restrictions on dietary calcium intake  Subjective:      Patient ID: Feliberto Lee is a 69 y o  female  HPI  67yo female with HTN, hypothyroidism, insomnia, prediabetes, fatty liver and ET here for follow up of labs and blood pressures  She was started on lisinopril 20mg daily due to elevated bp at her last visit  She has not been checking her home bp  She denies CP, SOB, HA, dizziness or LE edema  She was noted to have mild hypercalcemia on lab testing and was advised to limit calcium containing foods  She has since cut back on drinking milk and eggs  She did not try melatonin to help her sleep and reports she slept from 945a to 4a but would have preferred to stay asleep until 6a  She often has to urinate at around that time and then is unable to return to sleep  The following portions of the patient's history were reviewed and updated as appropriate: allergies, current medications, past family history, past medical history, past social history, past surgical history and problem list       Current Outpatient Medications:     amLODIPine (NORVASC) 5 mg tablet, Take 2 tablets (10 mg total) by mouth daily, Disp: 90 tablet, Rfl: 1    aspirin 81 MG tablet, Take 81 mg by mouth daily  , Disp: , Rfl:    hydrochlorothiazide (MICROZIDE) 12 5 mg capsule, Take 1 capsule (12 5 mg total) by mouth every morning, Disp: 90 capsule, Rfl: 2    KLOR-CON M20 20 MEQ tablet, Take 1 tablet (20 mEq total) by mouth daily (Patient taking differently: Take 10 mEq by mouth daily ), Disp: 90 tablet, Rfl: 1    levothyroxine 125 mcg tablet, Take 1 tablet (125 mcg total) by mouth daily, Disp: 90 tablet, Rfl: 3    lisinopril (ZESTRIL) 20 mg tablet, Take 1 tablet (20 mg total) by mouth daily, Disp: 90 tablet, Rfl: 1    Multiple Vitamin (MULTIVITAMIN) capsule, Take 1 capsule by mouth daily  , Disp: , Rfl:     Review of Systems   Constitutional: Negative for unexpected weight change  Respiratory: Negative  Cardiovascular: Negative  Gastrointestinal: Negative  Genitourinary:        Nocturia   Neurological: Negative  All other systems reviewed and are negative  Objective:    /76   Pulse 96   Temp 98 8 °F (37 1 °C)   Resp 16   Ht 5' 3" (1 6 m)   Wt 78 9 kg (174 lb)   SpO2 94%   BMI 30 82 kg/m²      Physical Exam   Constitutional: She appears well-developed and well-nourished  No distress  Smells of smoke   HENT:   Head: Normocephalic  Cardiovascular: Normal rate, regular rhythm, normal heart sounds and intact distal pulses  Pulmonary/Chest: Effort normal and breath sounds normal  No stridor  No respiratory distress  Neurological: She is alert  Psychiatric: She has a normal mood and affect  Her behavior is normal    Vitals reviewed        Recent Results (from the past 840 hour(s))   Basic metabolic panel    Collection Time: 01/07/20  9:49 AM   Result Value Ref Range    Sodium 140 136 - 145 mmol/L    Potassium 3 5 3 5 - 5 3 mmol/L    Chloride 107 100 - 108 mmol/L    CO2 27 21 - 32 mmol/L    ANION GAP 6 4 - 13 mmol/L    BUN 13 5 - 25 mg/dL    Creatinine 0 86 0 60 - 1 30 mg/dL    Glucose, Fasting 105 (H) 65 - 99 mg/dL    Calcium 9 9 8 3 - 10 1 mg/dL    eGFR 68 ml/min/1 73sq m   CBC and differential Collection Time: 01/07/20  9:49 AM   Result Value Ref Range    WBC 9 23 4 31 - 10 16 Thousand/uL    RBC 5 36 (H) 3 81 - 5 12 Million/uL    Hemoglobin 16 2 (H) 11 5 - 15 4 g/dL    Hematocrit 48 8 (H) 34 8 - 46 1 %    MCV 91 82 - 98 fL    MCH 30 2 26 8 - 34 3 pg    MCHC 33 2 31 4 - 37 4 g/dL    RDW 14 6 11 6 - 15 1 %    MPV 9 7 8 9 - 12 7 fL    Platelets 376 (H) 498 - 390 Thousands/uL    nRBC 0 /100 WBCs    Neutrophils Relative 70 43 - 75 %    Immat GRANS % 0 0 - 2 %    Lymphocytes Relative 20 14 - 44 %    Monocytes Relative 7 4 - 12 %    Eosinophils Relative 2 0 - 6 %    Basophils Relative 1 0 - 1 %    Neutrophils Absolute 6 37 1 85 - 7 62 Thousands/µL    Immature Grans Absolute 0 03 0 00 - 0 20 Thousand/uL    Lymphocytes Absolute 1 85 0 60 - 4 47 Thousands/µL    Monocytes Absolute 0 67 0 17 - 1 22 Thousand/µL    Eosinophils Absolute 0 21 0 00 - 0 61 Thousand/µL    Basophils Absolute 0 10 0 00 - 0 10 Thousands/µL

## 2020-01-27 ENCOUNTER — OFFICE VISIT (OUTPATIENT)
Dept: INTERNAL MEDICINE CLINIC | Facility: CLINIC | Age: 75
End: 2020-01-27
Payer: COMMERCIAL

## 2020-01-27 VITALS
BODY MASS INDEX: 30.83 KG/M2 | SYSTOLIC BLOOD PRESSURE: 128 MMHG | WEIGHT: 174 LBS | DIASTOLIC BLOOD PRESSURE: 76 MMHG | RESPIRATION RATE: 16 BRPM | OXYGEN SATURATION: 94 % | TEMPERATURE: 98.8 F | HEIGHT: 63 IN | HEART RATE: 96 BPM

## 2020-01-27 DIAGNOSIS — I10 BENIGN ESSENTIAL HYPERTENSION: Primary | ICD-10-CM

## 2020-01-27 DIAGNOSIS — G47.00 INSOMNIA, UNSPECIFIED TYPE: ICD-10-CM

## 2020-01-27 DIAGNOSIS — E83.52 HYPERCALCEMIA: ICD-10-CM

## 2020-01-27 PROCEDURE — 3074F SYST BP LT 130 MM HG: CPT | Performed by: INTERNAL MEDICINE

## 2020-01-27 PROCEDURE — 99214 OFFICE O/P EST MOD 30 MIN: CPT | Performed by: INTERNAL MEDICINE

## 2020-01-27 PROCEDURE — 1160F RVW MEDS BY RX/DR IN RCRD: CPT | Performed by: INTERNAL MEDICINE

## 2020-01-27 PROCEDURE — 3008F BODY MASS INDEX DOCD: CPT | Performed by: INTERNAL MEDICINE

## 2020-01-27 PROCEDURE — 3078F DIAST BP <80 MM HG: CPT | Performed by: INTERNAL MEDICINE

## 2020-01-27 RX ORDER — LISINOPRIL 20 MG/1
20 TABLET ORAL DAILY
Qty: 90 TABLET | Refills: 1 | Status: SHIPPED | OUTPATIENT
Start: 2020-01-27 | End: 2020-08-07 | Stop reason: SDUPTHER

## 2020-01-27 NOTE — ASSESSMENT & PLAN NOTE
Improved with addition of lisinopril 20mg daily, med renewed  She is also to continue on hctz 12 5mg daily and amlodipine 5m two tabs daily    Monitor BMP

## 2020-01-27 NOTE — ASSESSMENT & PLAN NOTE
Overall is patient is getting 6+ hrs of sleep    She is encouraged to try melatonin up to 10mg qHS prn

## 2020-01-30 NOTE — PROGRESS NOTES
Hematology/Oncology Outpatient Follow- up Note  Jorge Martinez 69 y o  female MRN: @ Encounter: [id number]        Date:  2/4/2020      Assessment / Plan:    77-year-old  female with essential thrombocythemia, positive for JAK2 mutation  Platelet count has increased from 590,000 in 2015- 2016 to [address] 5/2019 to 826 1/7/2020  HCT also increased to 48 8 from 45-47 in [id number]  Continue with ASA 81 - 162 mg po daily  Reviewed that her platelets have crossed over the 800,000 threshold and that her hematocrit is also increasing  Hydrea recommended  She is frustrated that was medications recommended  We discussed starting at every other day to which she is agreeable  Potential side effects could include but may not be limited to cytopenias, fatigue, nausea, secondary malignancies, hepatotoxicity, mucositis  Questions asked and answered  Follow-up in approximately 4-6 weeks with repeat CBC and CMP               HPI:  72year-old  female who was seen initially 6/2017 regarding persistent thrombocytosis going back to at least March 2015  May 2017 WBC 9 3, hemoglobin 15 1, MCV 91, platelets 309,069, 48% neutrophils, 19% lymphocytes, 9% monocytes, 2% eosinophils  November 2016 WBC 9 6, hemoglobin 15 1, platelets 491,966   June 2016 platelets 720,458   March 2016 platelets 805,267   March 2015 platelets 705,659  She has a history of high blood pressure, hypothyroidism,  cholecystectomy in March 2016   She is a smoker - used to smoke half pack of cigarettes every day for 40 years quit in 2014   She does not drink alcohol   History negative for blood disorders of cancer   FADIA, rheumatoid factor normal   Ultrasound of the abdomen did not identify hepatosplenomegaly   DEAN 2 mutation present         Interval History:    She is feeling well          Test Results:        Labs:   Lab Results   Component Value Date    HGB 16 2 (H) 01/07/2020    HCT 48 8 (H) 01/07/2020    MCV 91 01/07/2020    PLT 826 (H) 01/07/2020    WBC 9 23 01/07/2020    NRBC 0 01/07/2020     Lab Results   Component Value Date     (L) 12/29/2015    K 3 5 01/07/2020     01/07/2020    CO2 27 01/07/2020    ANIONGAP 13 12/29/2015    BUN 13 01/07/2020    CREATININE 0 86 01/07/2020    GLUCOSE 172 (H) 12/29/2015    GLUF 105 (H) 01/07/2020    CALCIUM 9 9 01/07/2020    CORRECTEDCA 10 3 (H) 12/15/2019    AST 17 12/15/2019    ALT 27 12/15/2019    ALKPHOS 104 12/15/2019    PROT 7 9 09/05/2015    BILITOT 0 47 09/05/2015    EGFR 68 01/07/2020       Imaging: No results found  ROS:  As mentioned in HPI & Interval History otherwise 14 point ROS negative  Allergies: No Known Allergies  Current Medications: Reviewed  PMH/FH/SH:  Reviewed      Physical Exam:    There is no height or weight on file to calculate BSA  Ht Readings from Last 3 Encounters:   01/27/20 5' 3" (1 6 m)   12/30/19 5' 3" (1 6 m)   08/21/19 5' 3" (1 6 m)        Wt Readings from Last 3 Encounters:   01/27/20 78 9 kg (174 lb)   12/30/19 79 4 kg (175 lb)   08/21/19 81 2 kg (179 lb)        Temp Readings from Last 3 Encounters:   01/27/20 98 8 °F (37 1 °C)   12/30/19 98 °F (36 7 °C)   08/21/19 97 7 °F (36 5 °C)        BP Readings from Last 3 Encounters:   01/27/20 128/76   12/30/19 142/90   08/21/19 146/80           Physical Exam   Constitutional: She is oriented to person, place, and time  She appears well-developed and well-nourished  No distress  HENT:   Head: Normocephalic and atraumatic  Eyes: Conjunctivae are normal    Neck: Normal range of motion  Neck supple  No tracheal deviation present  Cardiovascular: Normal rate and regular rhythm  Exam reveals no gallop and no friction rub  No murmur heard  Pulmonary/Chest: Effort normal and breath sounds normal  No respiratory distress  She has no wheezes  She has no rales  She exhibits no tenderness  Abdominal: Soft  She exhibits no distension  There is no tenderness     Lymphadenopathy:     She has no cervical adenopathy  Neurological: She is alert and oriented to person, place, and time  Skin: Skin is warm and dry  She is not diaphoretic  No erythema  No pallor  Psychiatric: She has a normal mood and affect  Her behavior is normal  Judgment and thought content normal    Vitals reviewed        [facility]      Emergency Contacts:    [place], [phone],

## 2020-02-02 ENCOUNTER — TRANSCRIBE ORDERS (OUTPATIENT)
Dept: ADMINISTRATIVE | Age: 75
End: 2020-02-02

## 2020-02-04 ENCOUNTER — OFFICE VISIT (OUTPATIENT)
Dept: HEMATOLOGY ONCOLOGY | Facility: CLINIC | Age: 75
End: 2020-02-04
Payer: COMMERCIAL

## 2020-02-04 ENCOUNTER — TELEPHONE (OUTPATIENT)
Dept: HEMATOLOGY ONCOLOGY | Facility: CLINIC | Age: 75
End: 2020-02-04

## 2020-02-04 VITALS
HEIGHT: 63 IN | TEMPERATURE: 97.8 F | WEIGHT: 177 LBS | RESPIRATION RATE: 14 BRPM | DIASTOLIC BLOOD PRESSURE: 74 MMHG | SYSTOLIC BLOOD PRESSURE: 130 MMHG | HEART RATE: 88 BPM | BODY MASS INDEX: 31.36 KG/M2 | OXYGEN SATURATION: 97 %

## 2020-02-04 DIAGNOSIS — D47.3 ESSENTIAL THROMBOCYTHEMIA (HCC): Primary | ICD-10-CM

## 2020-02-04 PROCEDURE — 3078F DIAST BP <80 MM HG: CPT | Performed by: PHYSICIAN ASSISTANT

## 2020-02-04 PROCEDURE — 3075F SYST BP GE 130 - 139MM HG: CPT | Performed by: PHYSICIAN ASSISTANT

## 2020-02-04 PROCEDURE — 4040F PNEUMOC VAC/ADMIN/RCVD: CPT | Performed by: PHYSICIAN ASSISTANT

## 2020-02-04 PROCEDURE — 99214 OFFICE O/P EST MOD 30 MIN: CPT | Performed by: PHYSICIAN ASSISTANT

## 2020-02-04 PROCEDURE — 3008F BODY MASS INDEX DOCD: CPT | Performed by: PHYSICIAN ASSISTANT

## 2020-02-04 PROCEDURE — 1036F TOBACCO NON-USER: CPT | Performed by: PHYSICIAN ASSISTANT

## 2020-02-04 PROCEDURE — 1160F RVW MEDS BY RX/DR IN RCRD: CPT | Performed by: PHYSICIAN ASSISTANT

## 2020-02-04 RX ORDER — HYDROXYUREA 500 MG/1
CAPSULE ORAL
Qty: 30 CAPSULE | Refills: 3 | Status: SHIPPED | OUTPATIENT
Start: 2020-02-04 | End: 2020-09-28 | Stop reason: SDUPTHER

## 2020-02-04 NOTE — TELEPHONE ENCOUNTER
Patient called questioning if she should stay on her Aspirin  Per Gigi Echols's office note today: Continue with ASA 81 - 162 mg po daily  Patient verbalized understanding of Aspirin directions  Patient stated that she would take 162 MG of Aspirin daily

## 2020-02-25 DIAGNOSIS — I10 BENIGN HYPERTENSION: ICD-10-CM

## 2020-02-25 DIAGNOSIS — I10 BENIGN ESSENTIAL HYPERTENSION: ICD-10-CM

## 2020-02-25 RX ORDER — AMLODIPINE BESYLATE 5 MG/1
10 TABLET ORAL DAILY
Qty: 180 TABLET | Refills: 1 | Status: SHIPPED | OUTPATIENT
Start: 2020-02-25 | End: 2020-08-27 | Stop reason: SDUPTHER

## 2020-03-10 DIAGNOSIS — E03.9 ACQUIRED HYPOTHYROIDISM: ICD-10-CM

## 2020-03-10 RX ORDER — LEVOTHYROXINE SODIUM 0.12 MG/1
125 TABLET ORAL DAILY
Qty: 90 TABLET | Refills: 3 | Status: SHIPPED | OUTPATIENT
Start: 2020-03-10 | End: 2020-07-27 | Stop reason: SDUPTHER

## 2020-03-13 NOTE — PROGRESS NOTES
Hematology/Oncology Outpatient Follow- up Note  Chantal Larsen 69 y o  female MRN: @ Encounter: [id number]        Date:  3/17/2020      Assessment / Plan:    62-year-old  female with essential thrombocythemia, positive for JAK2 mutation  She was frustrated to have to start medication  She was agreeable to [facility] every other day with was initiated 2/4/2020  H/H and platelet count have decreased as expected    Platelet count decreased from 826 1/7/20 to 702 3/15/20  Hematocrit decreased from 48 8 to 45 9  Continue with  mg po daily               HPI:  69year-old  female who was seen initially 6/2017 regarding persistent thrombocytosis going back to at least March 2015  May 2017 WBC 9 3, hemoglobin 15 1, MCV 91, platelets 094,634, 51% neutrophils, 19% lymphocytes, 9% monocytes, 2% eosinophils  November 2016 WBC 9 6, hemoglobin 15 1, platelets 655,106   June 2016 platelets 363,054   March 2016 platelets 167,019   March 2015 platelets 521,175  She has a history of high blood pressure, hypothyroidism,  cholecystectomy in March 2016   She is a smoker - used to smoke half pack of cigarettes every day for 40 years quit in 2014   She does not drink alcohol   History negative for blood disorders of cancer   FADIA, rheumatoid factor normal   Ultrasound of the abdomen did not identify hepatosplenomegaly   DEAN 2 mutation present  Interval History:    Hydrea 500mg po every other day initiated 2/2020     3/15/20:  Hemoglobin decreased from 16 2 to 15 1  Platelet count decreased from 826 to 702  Intermittent insomnia        Test Results:        Labs:   Lab Results   Component Value Date    HGB 16 2 (H) 01/07/2020    HCT 48 8 (H) 01/07/2020    MCV 91 01/07/2020     (H) 01/07/2020    WBC 9 23 01/07/2020    NRBC 0 01/07/2020     Lab Results   Component Value Date     (L) 12/29/2015    K 3 5 01/07/2020     01/07/2020    CO2 27 01/07/2020    ANIONGAP 13 12/29/2015 BUN 13 01/07/2020    CREATININE 0 86 01/07/2020    GLUCOSE 172 (H) 12/29/2015    GLUF 105 (H) 01/07/2020    CALCIUM 9 9 01/07/2020    CORRECTEDCA 10 3 (H) 12/15/2019    AST 17 12/15/2019    ALT 27 12/15/2019    ALKPHOS 104 12/15/2019    PROT 7 9 09/05/2015    BILITOT 0 47 09/05/2015    EGFR 68 01/07/2020       Imaging: No results found  ROS:  As mentioned in HPI & Interval History otherwise 14 point ROS negative  Allergies: No Known Allergies  Current Medications: Reviewed  PMH/FH/SH:  Reviewed      Physical Exam:    There is no height or weight on file to calculate BSA  Ht Readings from Last 3 Encounters:   02/04/20 5' 3" (1 6 m)   01/27/20 5' 3" (1 6 m)   12/30/19 5' 3" (1 6 m)        Wt Readings from Last 3 Encounters:   02/04/20 80 3 kg (177 lb)   01/27/20 78 9 kg (174 lb)   12/30/19 79 4 kg (175 lb)        Temp Readings from Last 3 Encounters:   02/04/20 97 8 °F (36 6 °C) (Tympanic)   01/27/20 98 8 °F (37 1 °C)   12/30/19 98 °F (36 7 °C)        BP Readings from Last 3 Encounters:   02/04/20 130/74   01/27/20 128/76   12/30/19 142/90           Physical Exam   Constitutional: She is oriented to person, place, and time  She appears well-developed and well-nourished  No distress  HENT:   Head: Normocephalic and atraumatic  Eyes: Conjunctivae are normal    Neck: Normal range of motion  Neck supple  No tracheal deviation present  Cardiovascular: Normal rate and regular rhythm  Exam reveals no gallop and no friction rub  No murmur heard  Pulmonary/Chest: Effort normal and breath sounds normal  No respiratory distress  She has no wheezes  She has no rales  She exhibits no tenderness  Abdominal: Soft  She exhibits no distension  There is no tenderness  Lymphadenopathy:     She has no cervical adenopathy  Neurological: She is alert and oriented to person, place, and time  Skin: Skin is warm and dry  She is not diaphoretic  No erythema  No pallor     Psychiatric: She has a normal mood and affect  Her behavior is normal  Judgment and thought content normal    Vitals reviewed              Emergency Contacts:    [place], [phone],

## 2020-03-15 ENCOUNTER — APPOINTMENT (OUTPATIENT)
Dept: LAB | Age: 75
End: 2020-03-15
Payer: COMMERCIAL

## 2020-03-15 DIAGNOSIS — D47.3 ESSENTIAL THROMBOCYTHEMIA (HCC): ICD-10-CM

## 2020-03-15 LAB
ALBUMIN SERPL BCP-MCNC: 3.5 G/DL (ref 3.5–5)
ALP SERPL-CCNC: 90 U/L (ref 46–116)
ALT SERPL W P-5'-P-CCNC: 20 U/L (ref 12–78)
ANION GAP SERPL CALCULATED.3IONS-SCNC: 4 MMOL/L (ref 4–13)
AST SERPL W P-5'-P-CCNC: 10 U/L (ref 5–45)
BASOPHILS # BLD AUTO: 0.08 THOUSANDS/ΜL (ref 0–0.1)
BASOPHILS NFR BLD AUTO: 1 % (ref 0–1)
BILIRUB SERPL-MCNC: 0.44 MG/DL (ref 0.2–1)
BUN SERPL-MCNC: 15 MG/DL (ref 5–25)
CALCIUM SERPL-MCNC: 9.3 MG/DL (ref 8.3–10.1)
CHLORIDE SERPL-SCNC: 106 MMOL/L (ref 100–108)
CO2 SERPL-SCNC: 29 MMOL/L (ref 21–32)
CREAT SERPL-MCNC: 0.77 MG/DL (ref 0.6–1.3)
EOSINOPHIL # BLD AUTO: 0.22 THOUSAND/ΜL (ref 0–0.61)
EOSINOPHIL NFR BLD AUTO: 3 % (ref 0–6)
ERYTHROCYTE [DISTWIDTH] IN BLOOD BY AUTOMATED COUNT: 15.9 % (ref 11.6–15.1)
GFR SERPL CREATININE-BSD FRML MDRD: 77 ML/MIN/1.73SQ M
GLUCOSE P FAST SERPL-MCNC: 112 MG/DL (ref 65–99)
HCT VFR BLD AUTO: 45.9 % (ref 34.8–46.1)
HGB BLD-MCNC: 15.1 G/DL (ref 11.5–15.4)
IMM GRANULOCYTES # BLD AUTO: 0.02 THOUSAND/UL (ref 0–0.2)
IMM GRANULOCYTES NFR BLD AUTO: 0 % (ref 0–2)
LYMPHOCYTES # BLD AUTO: 1.49 THOUSANDS/ΜL (ref 0.6–4.47)
LYMPHOCYTES NFR BLD AUTO: 18 % (ref 14–44)
MCH RBC QN AUTO: 30.8 PG (ref 26.8–34.3)
MCHC RBC AUTO-ENTMCNC: 32.9 G/DL (ref 31.4–37.4)
MCV RBC AUTO: 94 FL (ref 82–98)
MONOCYTES # BLD AUTO: 0.54 THOUSAND/ΜL (ref 0.17–1.22)
MONOCYTES NFR BLD AUTO: 6 % (ref 4–12)
NEUTROPHILS # BLD AUTO: 6.1 THOUSANDS/ΜL (ref 1.85–7.62)
NEUTS SEG NFR BLD AUTO: 72 % (ref 43–75)
NRBC BLD AUTO-RTO: 0 /100 WBCS
PLATELET # BLD AUTO: 702 THOUSANDS/UL (ref 149–390)
PMV BLD AUTO: 9 FL (ref 8.9–12.7)
POTASSIUM SERPL-SCNC: 3.7 MMOL/L (ref 3.5–5.3)
PROT SERPL-MCNC: 7.2 G/DL (ref 6.4–8.2)
RBC # BLD AUTO: 4.91 MILLION/UL (ref 3.81–5.12)
SODIUM SERPL-SCNC: 139 MMOL/L (ref 136–145)
WBC # BLD AUTO: 8.45 THOUSAND/UL (ref 4.31–10.16)

## 2020-03-15 PROCEDURE — 80053 COMPREHEN METABOLIC PANEL: CPT

## 2020-03-15 PROCEDURE — 85025 COMPLETE CBC W/AUTO DIFF WBC: CPT

## 2020-03-15 PROCEDURE — 36415 COLL VENOUS BLD VENIPUNCTURE: CPT

## 2020-03-16 ENCOUNTER — TELEPHONE (OUTPATIENT)
Dept: HEMATOLOGY ONCOLOGY | Facility: CLINIC | Age: 75
End: 2020-03-16

## 2020-03-17 ENCOUNTER — OFFICE VISIT (OUTPATIENT)
Dept: HEMATOLOGY ONCOLOGY | Facility: CLINIC | Age: 75
End: 2020-03-17
Payer: COMMERCIAL

## 2020-03-17 VITALS
SYSTOLIC BLOOD PRESSURE: 134 MMHG | TEMPERATURE: 97.8 F | BODY MASS INDEX: 31.89 KG/M2 | RESPIRATION RATE: 16 BRPM | OXYGEN SATURATION: 95 % | WEIGHT: 180 LBS | HEART RATE: 87 BPM | HEIGHT: 63 IN | DIASTOLIC BLOOD PRESSURE: 80 MMHG

## 2020-03-17 DIAGNOSIS — D47.3 ESSENTIAL THROMBOCYTHEMIA (HCC): Primary | ICD-10-CM

## 2020-03-17 PROCEDURE — 3008F BODY MASS INDEX DOCD: CPT | Performed by: PHYSICIAN ASSISTANT

## 2020-03-17 PROCEDURE — 3075F SYST BP GE 130 - 139MM HG: CPT | Performed by: PHYSICIAN ASSISTANT

## 2020-03-17 PROCEDURE — 1036F TOBACCO NON-USER: CPT | Performed by: PHYSICIAN ASSISTANT

## 2020-03-17 PROCEDURE — 4040F PNEUMOC VAC/ADMIN/RCVD: CPT | Performed by: PHYSICIAN ASSISTANT

## 2020-03-17 PROCEDURE — 1160F RVW MEDS BY RX/DR IN RCRD: CPT | Performed by: PHYSICIAN ASSISTANT

## 2020-03-17 PROCEDURE — 99213 OFFICE O/P EST LOW 20 MIN: CPT | Performed by: PHYSICIAN ASSISTANT

## 2020-03-17 PROCEDURE — 3079F DIAST BP 80-89 MM HG: CPT | Performed by: PHYSICIAN ASSISTANT

## 2020-05-01 DIAGNOSIS — E87.6 HYPOKALEMIA: ICD-10-CM

## 2020-05-01 RX ORDER — POTASSIUM CHLORIDE 1500 MG/1
20 TABLET, EXTENDED RELEASE ORAL DAILY
Qty: 90 TABLET | Refills: 1 | Status: SHIPPED | OUTPATIENT
Start: 2020-05-01 | End: 2021-06-04 | Stop reason: SDUPTHER

## 2020-06-23 DIAGNOSIS — E87.1 HYPONATREMIA: ICD-10-CM

## 2020-06-23 RX ORDER — HYDROCHLOROTHIAZIDE 12.5 MG/1
12.5 CAPSULE, GELATIN COATED ORAL EVERY MORNING
Qty: 90 CAPSULE | Refills: 2 | Status: SHIPPED | OUTPATIENT
Start: 2020-06-23 | End: 2021-04-14 | Stop reason: SDUPTHER

## 2020-07-18 ENCOUNTER — APPOINTMENT (OUTPATIENT)
Dept: LAB | Age: 75
End: 2020-07-18
Payer: COMMERCIAL

## 2020-07-18 DIAGNOSIS — I10 BENIGN ESSENTIAL HYPERTENSION: ICD-10-CM

## 2020-07-18 DIAGNOSIS — D47.3 ESSENTIAL THROMBOCYTHEMIA (HCC): ICD-10-CM

## 2020-07-18 LAB
ALBUMIN SERPL BCP-MCNC: 3.8 G/DL (ref 3.5–5)
ALP SERPL-CCNC: 88 U/L (ref 46–116)
ALT SERPL W P-5'-P-CCNC: 21 U/L (ref 12–78)
ANION GAP SERPL CALCULATED.3IONS-SCNC: 6 MMOL/L (ref 4–13)
AST SERPL W P-5'-P-CCNC: 14 U/L (ref 5–45)
BASOPHILS # BLD AUTO: 0.08 THOUSANDS/ΜL (ref 0–0.1)
BASOPHILS NFR BLD AUTO: 1 % (ref 0–1)
BILIRUB SERPL-MCNC: 0.5 MG/DL (ref 0.2–1)
BILIRUB UR QL STRIP: NEGATIVE
BUN SERPL-MCNC: 10 MG/DL (ref 5–25)
CALCIUM ALBUM COR SERPL-MCNC: 10.5 MG/DL (ref 8.3–10.1)
CALCIUM SERPL-MCNC: 10.3 MG/DL (ref 8.3–10.1)
CHLORIDE SERPL-SCNC: 106 MMOL/L (ref 100–108)
CHOLEST SERPL-MCNC: 182 MG/DL (ref 50–200)
CLARITY UR: CLEAR
CO2 SERPL-SCNC: 26 MMOL/L (ref 21–32)
COLOR UR: YELLOW
CREAT SERPL-MCNC: 0.76 MG/DL (ref 0.6–1.3)
EOSINOPHIL # BLD AUTO: 0.11 THOUSAND/ΜL (ref 0–0.61)
EOSINOPHIL NFR BLD AUTO: 1 % (ref 0–6)
ERYTHROCYTE [DISTWIDTH] IN BLOOD BY AUTOMATED COUNT: 13.6 % (ref 11.6–15.1)
GFR SERPL CREATININE-BSD FRML MDRD: 78 ML/MIN/1.73SQ M
GLUCOSE P FAST SERPL-MCNC: 96 MG/DL (ref 65–99)
GLUCOSE UR STRIP-MCNC: NEGATIVE MG/DL
HCT VFR BLD AUTO: 46.6 % (ref 34.8–46.1)
HDLC SERPL-MCNC: 41 MG/DL
HGB BLD-MCNC: 15.4 G/DL (ref 11.5–15.4)
HGB UR QL STRIP.AUTO: NEGATIVE
IMM GRANULOCYTES # BLD AUTO: 0.03 THOUSAND/UL (ref 0–0.2)
IMM GRANULOCYTES NFR BLD AUTO: 0 % (ref 0–2)
KETONES UR STRIP-MCNC: NEGATIVE MG/DL
LDLC SERPL CALC-MCNC: 122 MG/DL (ref 0–100)
LEUKOCYTE ESTERASE UR QL STRIP: NEGATIVE
LYMPHOCYTES # BLD AUTO: 1.57 THOUSANDS/ΜL (ref 0.6–4.47)
LYMPHOCYTES NFR BLD AUTO: 18 % (ref 14–44)
MCH RBC QN AUTO: 32.8 PG (ref 26.8–34.3)
MCHC RBC AUTO-ENTMCNC: 33 G/DL (ref 31.4–37.4)
MCV RBC AUTO: 99 FL (ref 82–98)
MONOCYTES # BLD AUTO: 0.67 THOUSAND/ΜL (ref 0.17–1.22)
MONOCYTES NFR BLD AUTO: 8 % (ref 4–12)
NEUTROPHILS # BLD AUTO: 6.32 THOUSANDS/ΜL (ref 1.85–7.62)
NEUTS SEG NFR BLD AUTO: 72 % (ref 43–75)
NITRITE UR QL STRIP: NEGATIVE
NONHDLC SERPL-MCNC: 141 MG/DL
NRBC BLD AUTO-RTO: 0 /100 WBCS
PH UR STRIP.AUTO: 6.5 [PH]
PLATELET # BLD AUTO: 620 THOUSANDS/UL (ref 149–390)
PMV BLD AUTO: 9.5 FL (ref 8.9–12.7)
POTASSIUM SERPL-SCNC: 4.7 MMOL/L (ref 3.5–5.3)
PROT SERPL-MCNC: 7.2 G/DL (ref 6.4–8.2)
PROT UR STRIP-MCNC: NEGATIVE MG/DL
RBC # BLD AUTO: 4.7 MILLION/UL (ref 3.81–5.12)
SODIUM SERPL-SCNC: 138 MMOL/L (ref 136–145)
SP GR UR STRIP.AUTO: 1.01 (ref 1–1.03)
T4 FREE SERPL-MCNC: 1.7 NG/DL (ref 0.76–1.46)
TRIGL SERPL-MCNC: 95 MG/DL
TSH SERPL DL<=0.05 MIU/L-ACNC: 0.12 UIU/ML (ref 0.36–3.74)
UROBILINOGEN UR QL STRIP.AUTO: 0.2 E.U./DL
WBC # BLD AUTO: 8.78 THOUSAND/UL (ref 4.31–10.16)

## 2020-07-18 PROCEDURE — 80053 COMPREHEN METABOLIC PANEL: CPT

## 2020-07-18 PROCEDURE — 81003 URINALYSIS AUTO W/O SCOPE: CPT

## 2020-07-18 PROCEDURE — 84443 ASSAY THYROID STIM HORMONE: CPT

## 2020-07-18 PROCEDURE — 36415 COLL VENOUS BLD VENIPUNCTURE: CPT

## 2020-07-18 PROCEDURE — 85025 COMPLETE CBC W/AUTO DIFF WBC: CPT

## 2020-07-18 PROCEDURE — 84439 ASSAY OF FREE THYROXINE: CPT

## 2020-07-18 PROCEDURE — 80061 LIPID PANEL: CPT

## 2020-07-26 NOTE — PROGRESS NOTES
Assessment/Plan:    Problem List Items Addressed This Visit        Endocrine    Hypothyroidism - Primary     -suppressed TSH with elevated FT4  -decrease levothyroxine to 112mcg daily with repeat TFTs in 6 weeks         Relevant Medications    levothyroxine 112 mcg tablet    Other Relevant Orders    TSH, 3rd generation with Free T4 reflex    Comprehensive metabolic panel    TSH, 3rd generation with Free T4 reflex       Cardiovascular and Mediastinum    Benign essential hypertension     -controlled, stable  -continue on amlodipine 10mg daily, hctz 12 5mg daily and lisinopril 20mg daily  -may need to consider holding hctz if calcium does not normalize despite dietary restrictions         Relevant Orders    Comprehensive metabolic panel       Hematopoietic and Hemostatic    Essential thrombocythemia (HCC)     -platelet counts are decreasing now 620  -continue on hydrea 500mg daily per Heme            Other    Hypercalcemia     -reinforced low dietary calcium intake  -repeat BMP  -if calcium levels do not normalize, then hold hctz and repeat BMP after one month         Relevant Orders    Basic metabolic panel      Other Visit Diagnoses     Medicare annual wellness visit, subsequent              Subjective:      Patient ID: Adiel Pagan is a 70 y o  female  81yo female with HTN, insomnia, hypothyroidism, fatty liver, prediabetes and ET here for follow up care  Hypertension   This is a chronic problem  The current episode started more than 1 year ago  The problem is controlled  Associated symptoms include malaise/fatigue  Pertinent negatives include no chest pain, headaches, palpitations or shortness of breath  Risk factors for coronary artery disease include obesity and post-menopausal state  Past treatments include calcium channel blockers, diuretics and ACE inhibitors  Compliance problems include diet and exercise  Identifiable causes of hypertension include a thyroid problem     Thyroid Problem   Presents for follow-up visit  Symptoms include anxiety, diaphoresis, fatigue and weight loss  Patient reports no constipation, diarrhea, leg swelling or palpitations  Patient admits she has been eating more ice cream this summer  She only has milk in her coffee  She denies urinary or abdominal complaints  The following portions of the patient's history were reviewed and updated as appropriate: allergies, current medications, past family history, past medical history, past social history, past surgical history and problem list       Current Outpatient Medications:     amLODIPine (NORVASC) 5 mg tablet, Take 2 tablets (10 mg total) by mouth daily, Disp: 180 tablet, Rfl: 1    aspirin 81 MG tablet, Take 81 mg by mouth daily  , Disp: , Rfl:     hydrochlorothiazide (MICROZIDE) 12 5 mg capsule, Take 1 capsule (12 5 mg total) by mouth every morning, Disp: 90 capsule, Rfl: 2    hydroxyurea (HYDREA) 500 mg capsule, Take 1 by mouth every other day, Disp: 30 capsule, Rfl: 3    KLOR-CON M20 20 MEQ tablet, Take 1 tablet (20 mEq total) by mouth daily, Disp: 90 tablet, Rfl: 1    levothyroxine 112 mcg tablet, Take 1 tablet (112 mcg total) by mouth daily, Disp: 30 tablet, Rfl: 1    lisinopril (ZESTRIL) 20 mg tablet, Take 1 tablet (20 mg total) by mouth daily, Disp: 90 tablet, Rfl: 1    Multiple Vitamin (MULTIVITAMIN) capsule, Take 1 capsule by mouth daily  , Disp: , Rfl:     Review of Systems   Constitutional: Positive for diaphoresis, fatigue, malaise/fatigue, unexpected weight change (weight loss) and weight loss  Negative for activity change and appetite change  HENT: Positive for hearing loss  Respiratory: Negative for cough, shortness of breath, wheezing and stridor  Cardiovascular: Negative for chest pain, palpitations and leg swelling  Gastrointestinal: Negative for abdominal pain, constipation, diarrhea, nausea and vomiting  Genitourinary: Negative for difficulty urinating     Musculoskeletal: Positive for back pain  Negative for arthralgias, gait problem and joint swelling  Neurological: Negative for dizziness, weakness, numbness and headaches  Hematological: Bruises/bleeds easily  Psychiatric/Behavioral: Positive for sleep disturbance  Negative for decreased concentration and dysphoric mood  The patient is nervous/anxious  Objective:    /80 (BP Location: Left arm, Patient Position: Sitting)   Pulse 87   Temp 98 8 °F (37 1 °C)   Resp 16   Ht 5' 3" (1 6 m)   Wt 79 3 kg (174 lb 12 8 oz)   SpO2 98%   BMI 30 96 kg/m²      Physical Exam   Constitutional: She is oriented to person, place, and time  She appears well-developed and well-nourished  No distress  HENT:   Head: Normocephalic  Right Ear: External ear normal    Left Ear: External ear normal    Nose: Nose normal    Mouth/Throat: Oropharynx is clear and moist  No oropharyngeal exudate  Eyes: Conjunctivae and EOM are normal    Wears glasses   Neck: Neck supple  Carotid bruit is not present  No thyromegaly present  Cardiovascular: Normal rate, regular rhythm, normal heart sounds and intact distal pulses  Pulmonary/Chest: Effort normal and breath sounds normal  No stridor  No respiratory distress  Abdominal: Soft  Bowel sounds are normal  She exhibits no distension  There is no tenderness  obese   Lymphadenopathy:     She has no cervical adenopathy  Neurological: She is alert and oriented to person, place, and time  Skin: She is not diaphoretic  Psychiatric: Her speech is normal and behavior is normal  Her mood appears not anxious  She does not exhibit a depressed mood  She is attentive  Vitals reviewed        Recent Results (from the past 504 hour(s))   Comprehensive metabolic panel    Collection Time: 07/18/20  9:21 AM   Result Value Ref Range    Sodium 138 136 - 145 mmol/L    Potassium 4 7 3 5 - 5 3 mmol/L    Chloride 106 100 - 108 mmol/L    CO2 26 21 - 32 mmol/L    ANION GAP 6 4 - 13 mmol/L    BUN 10 5 - 25 mg/dL Creatinine 0 76 0 60 - 1 30 mg/dL    Glucose, Fasting 96 65 - 99 mg/dL    Calcium 10 3 (H) 8 3 - 10 1 mg/dL    Corrected Calcium 10 5 (H) 8 3 - 10 1 mg/dL    AST 14 5 - 45 U/L    ALT 21 12 - 78 U/L    Alkaline Phosphatase 88 46 - 116 U/L    Total Protein 7 2 6 4 - 8 2 g/dL    Albumin 3 8 3 5 - 5 0 g/dL    Total Bilirubin 0 50 0 20 - 1 00 mg/dL    eGFR 78 ml/min/1 73sq m   TSH, 3rd generation with Free T4 reflex    Collection Time: 07/18/20  9:21 AM   Result Value Ref Range    TSH 3RD GENERATON 0 118 (L) 0 358 - 3 740 uIU/mL   Lipid panel    Collection Time: 07/18/20  9:21 AM   Result Value Ref Range    Cholesterol 182 50 - 200 mg/dL    Triglycerides 95 <=150 mg/dL    HDL, Direct 41 >=40 mg/dL    LDL Calculated 122 (H) 0 - 100 mg/dL    Non-HDL-Chol (CHOL-HDL) 141 mg/dl   UA (URINE) with reflex to Scope    Collection Time: 07/18/20  9:21 AM   Result Value Ref Range    Color, UA Yellow     Clarity, UA Clear     Specific Gravity, UA 1 007 1 003 - 1 030    pH, UA 6 5 4 5, 5 0, 5 5, 6 0, 6 5, 7 0, 7 5, 8 0    Leukocytes, UA Negative Negative    Nitrite, UA Negative Negative    Protein, UA Negative Negative mg/dl    Glucose, UA Negative Negative mg/dl    Ketones, UA Negative Negative mg/dl    Urobilinogen, UA 0 2 0 2, 1 0 E U /dl E U /dl    Bilirubin, UA Negative Negative    Blood, UA Negative Negative   CBC and differential    Collection Time: 07/18/20  9:21 AM   Result Value Ref Range    WBC 8 78 4 31 - 10 16 Thousand/uL    RBC 4 70 3 81 - 5 12 Million/uL    Hemoglobin 15 4 11 5 - 15 4 g/dL    Hematocrit 46 6 (H) 34 8 - 46 1 %    MCV 99 (H) 82 - 98 fL    MCH 32 8 26 8 - 34 3 pg    MCHC 33 0 31 4 - 37 4 g/dL    RDW 13 6 11 6 - 15 1 %    MPV 9 5 8 9 - 12 7 fL    Platelets 887 (H) 604 - 390 Thousands/uL    nRBC 0 /100 WBCs    Neutrophils Relative 72 43 - 75 %    Immat GRANS % 0 0 - 2 %    Lymphocytes Relative 18 14 - 44 %    Monocytes Relative 8 4 - 12 %    Eosinophils Relative 1 0 - 6 %    Basophils Relative 1 0 - 1 % Neutrophils Absolute 6 32 1 85 - 7 62 Thousands/µL    Immature Grans Absolute 0 03 0 00 - 0 20 Thousand/uL    Lymphocytes Absolute 1 57 0 60 - 4 47 Thousands/µL    Monocytes Absolute 0 67 0 17 - 1 22 Thousand/µL    Eosinophils Absolute 0 11 0 00 - 0 61 Thousand/µL    Basophils Absolute 0 08 0 00 - 0 10 Thousands/µL   T4, free    Collection Time: 07/18/20  9:21 AM   Result Value Ref Range    Free T4 1 70 (H) 0 76 - 1 46 ng/dL

## 2020-07-27 ENCOUNTER — TELEPHONE (OUTPATIENT)
Dept: HEMATOLOGY ONCOLOGY | Facility: CLINIC | Age: 75
End: 2020-07-27

## 2020-07-27 ENCOUNTER — OFFICE VISIT (OUTPATIENT)
Dept: INTERNAL MEDICINE CLINIC | Facility: CLINIC | Age: 75
End: 2020-07-27
Payer: COMMERCIAL

## 2020-07-27 VITALS
OXYGEN SATURATION: 98 % | RESPIRATION RATE: 16 BRPM | HEIGHT: 63 IN | SYSTOLIC BLOOD PRESSURE: 122 MMHG | HEART RATE: 87 BPM | TEMPERATURE: 98.8 F | DIASTOLIC BLOOD PRESSURE: 80 MMHG | BODY MASS INDEX: 30.97 KG/M2 | WEIGHT: 174.8 LBS

## 2020-07-27 DIAGNOSIS — I10 BENIGN ESSENTIAL HYPERTENSION: ICD-10-CM

## 2020-07-27 DIAGNOSIS — E03.9 ACQUIRED HYPOTHYROIDISM: Primary | ICD-10-CM

## 2020-07-27 DIAGNOSIS — D47.3 ESSENTIAL THROMBOCYTHEMIA (HCC): ICD-10-CM

## 2020-07-27 DIAGNOSIS — E83.52 HYPERCALCEMIA: ICD-10-CM

## 2020-07-27 DIAGNOSIS — Z00.00 MEDICARE ANNUAL WELLNESS VISIT, SUBSEQUENT: ICD-10-CM

## 2020-07-27 PROCEDURE — 99214 OFFICE O/P EST MOD 30 MIN: CPT | Performed by: INTERNAL MEDICINE

## 2020-07-27 PROCEDURE — 1125F AMNT PAIN NOTED PAIN PRSNT: CPT | Performed by: INTERNAL MEDICINE

## 2020-07-27 PROCEDURE — 3008F BODY MASS INDEX DOCD: CPT | Performed by: INTERNAL MEDICINE

## 2020-07-27 PROCEDURE — 1036F TOBACCO NON-USER: CPT | Performed by: INTERNAL MEDICINE

## 2020-07-27 PROCEDURE — 3074F SYST BP LT 130 MM HG: CPT | Performed by: INTERNAL MEDICINE

## 2020-07-27 PROCEDURE — 1160F RVW MEDS BY RX/DR IN RCRD: CPT | Performed by: INTERNAL MEDICINE

## 2020-07-27 PROCEDURE — G0439 PPPS, SUBSEQ VISIT: HCPCS | Performed by: INTERNAL MEDICINE

## 2020-07-27 PROCEDURE — 3079F DIAST BP 80-89 MM HG: CPT | Performed by: INTERNAL MEDICINE

## 2020-07-27 PROCEDURE — 1170F FXNL STATUS ASSESSED: CPT | Performed by: INTERNAL MEDICINE

## 2020-07-27 PROCEDURE — 4040F PNEUMOC VAC/ADMIN/RCVD: CPT | Performed by: INTERNAL MEDICINE

## 2020-07-27 RX ORDER — LEVOTHYROXINE SODIUM 112 UG/1
112 TABLET ORAL DAILY
Qty: 30 TABLET | Refills: 1 | Status: SHIPPED | OUTPATIENT
Start: 2020-07-27 | End: 2020-09-21 | Stop reason: SDUPTHER

## 2020-07-27 NOTE — ASSESSMENT & PLAN NOTE
-controlled, stable  -continue on amlodipine 10mg daily, hctz 12 5mg daily and lisinopril 20mg daily  -may need to consider holding hctz if calcium does not normalize despite dietary restrictions

## 2020-07-27 NOTE — ASSESSMENT & PLAN NOTE
-reinforced low dietary calcium intake  -repeat BMP  -if calcium levels do not normalize, then hold hctz and repeat BMP after one month

## 2020-07-27 NOTE — PROGRESS NOTES
Assessment and Plan:     Problem List Items Addressed This Visit     None      Visit Diagnoses     Medicare annual wellness visit, subsequent    -  Primary        BMI Counseling: Body mass index is 30 96 kg/m²  The BMI is above normal  Nutrition recommendations include decreasing portion sizes, consuming healthier snacks, limiting drinks that contain sugar, moderation in carbohydrate intake and reducing intake of cholesterol  Exercise recommendations include moderate physical activity 150 minutes/week and exercising 3-5 times per week  No pharmacotherapy was ordered  Preventive health issues were discussed with patient, and age appropriate screening tests were ordered as noted in patient's After Visit Summary  Personalized health advice and appropriate referrals for health education or preventive services given if needed, as noted in patient's After Visit Summary  History of Present Illness:     Patient presents for Medicare Annual Wellness visit    Patient Care Team:  Skylar Guerrero DO as PCP - General  Maximiliano Malloy MD  [address] [place] [address]     Problem List:     Patient Active Problem List   Diagnosis    Hypokalemia    Myeloproliferative disorder ([address] )    Benign essential hypertension    Essential thrombocythemia ([facility] [address]    Fatty liver    Hypothyroidism    Impaired fasting glucose    Insomnia    Osteopenia    Prediabetes    Obesity (BMI 30 0-34  9)    Hypercalcemia      Past Medical and Surgical History:     Past Medical History:   Diagnosis Date    Biliary colic     last assessed: 01/05/16    Cholecystitis     Disease of thyroid gland     hypo    Hematuria     last assessed: 05/05/17    Hypertension     Leukocytosis     last assessed: 04/05/16    Toxic nodular goiter     Uterine fibroid      Past Surgical History:   Procedure Laterality Date    HYSTERECTOMY      INGRIS, with removal of both ovaries    SD LAP,CHOLECYSTECTOMY N/A 3/9/2016    Procedure: LAPAROSCOPIC CHOLECYSTECTOMY ; Surgeon: Jeff Sheth MD;  Location: [facility];  Service: General    TUBAL LIGATION        Family History:     Family History   Problem Relation Age of Onset    Hypothyroidism Mother     Heart disease Brother         Cardiac disorder      Social History:        Social History     Socioeconomic History    Marital status:      Spouse name: None    Number of children: None    Years of education: None    Highest education level: None   Occupational History    Occupation: Retired   Social Needs    Financial resource strain: None    Food insecurity:     Worry: None     Inability: None    Transportation needs:     Medical: None     Non-medical: None   Tobacco Use    Smoking status: Former Smoker     Packs/day: 0 50     Years: 40 00     Pack years: 20 00     Last attempt to quit: 2012     Years since quittin 5    Smokeless tobacco: Never Used   Substance and Sexual Activity    Alcohol use: No    Drug use: No    Sexual activity: Never   Lifestyle    Physical activity:     Days per week: None     Minutes per session: None    Stress: None   Relationships    Social connections:     Talks on phone: None     Gets together: None     Attends [employer] service: None     Active member of club or organization: None     Attends meetings of clubs or organizations: None     Relationship status: None    Intimate partner violence:     Fear of current or ex partner: None     Emotionally abused: None     Physically abused: None     Forced sexual activity: None   Other Topics Concern    None   Social History Narrative    None      Medications and Allergies:     Current Outpatient Medications   Medication Sig Dispense Refill    amLODIPine (NORVASC) 5 mg tablet Take 2 tablets (10 mg total) by mouth daily 180 tablet 1    aspirin 81 MG tablet Take 81 mg by mouth daily        hydrochlorothiazide (MICROZIDE) 12 5 mg capsule Take 1 capsule (12 5 mg total) by mouth every morning 90 capsule 2    hydroxyurea (HYDREA) 500 mg capsule Take 1 by mouth every other day 30 capsule 3    KLOR-CON M20 20 MEQ tablet Take 1 tablet (20 mEq total) by mouth daily 90 tablet 1    levothyroxine 125 mcg tablet Take 1 tablet (125 mcg total) by mouth daily 90 tablet 3    lisinopril (ZESTRIL) 20 mg tablet Take 1 tablet (20 mg total) by mouth daily 90 tablet 1    Multiple Vitamin (MULTIVITAMIN) capsule Take 1 capsule by mouth daily  No current facility-administered medications for this visit  No Known Allergies   Immunizations:     Immunization History   Administered Date(s) Administered    Influenza Split High Dose Preservative Free IM 12/11/2013, 11/03/2014, 09/09/2015, 11/10/2016, 10/06/2017    Influenza TIV (IM) 11/07/2012    Influenza, high dose seasonal 0 5 mL 10/17/2018, 12/30/2019    Pneumococcal Conjugate 13-Valent 11/03/2014    Pneumococcal Polysaccharide PPV23 11/07/2012      Health Maintenance:         Topic Date Due    CRC Screening: Colonoscopy  07/27/2021 (Originally 1/16/2014)    MAMMOGRAM  01/24/2021    Hepatitis C Screening  Completed         Topic Date Due    Influenza Vaccine  07/01/2020      Medicare Health Risk Assessment:     /80 (BP Location: Left arm, Patient Position: Sitting)   Pulse (!) 107   Temp 98 8 °F (37 1 °C)   Resp 16   Ht 5' 3" (1 6 m)   Wt 79 3 kg (174 lb 12 8 oz)   SpO2 98%   BMI 30 96 kg/m²      Norma Watkins is here for her Subsequent Wellness visit  Last Medicare Wellness visit information reviewed, patient interviewed and updates made to the record today  Health Risk Assessment:   Patient rates overall health as good  Patient feels that their physical health rating is same  Eyesight was rated as same  Hearing was rated as same  Patient feels that their emotional and mental health rating is same  Pain experienced in the last 7 days has been none  Patient states that she has experienced no weight loss or gain in last 6 months       Depression Screening:   PHQ-2 Score: 0      Fall Risk Screening: In the past year, patient has experienced: no history of falling in past year      Urinary Incontinence Screening:   Patient has leaked urine accidently in the last six months  Home Safety:  Patient does not have trouble with stairs inside or outside of their home  Patient has working smoke alarms and has working carbon monoxide detector  Home safety hazards include: none  Nutrition:   Current diet is Regular  Medications:   Patient is not currently taking any over-the-counter supplements  Patient is able to manage medications  Activities of Daily Living (ADLs)/Instrumental Activities of Daily Living (IADLs):   Walk and transfer into and out of bed and chair?: Yes  Dress and groom yourself?: Yes    Bathe or shower yourself?: Yes    Feed yourself?  Yes  Do your laundry/housekeeping?: Yes  Manage your money, pay your bills and track your expenses?: Yes  Make your own meals?: Yes    Do your own shopping?: Yes    Durable Medical Equipment Suppliers  patient does not know    Previous Hospitalizations:   Any hospitalizations or ED visits within the last 12 months?: No      Advance Care Planning:   Living will: No    Durable POA for healthcare: No    Advanced directive: No    Advanced directive counseling given: Yes      Cognitive Screening:   Provider or family/friend/caregiver concerned regarding cognition?: No    PREVENTIVE SCREENINGS      Cardiovascular Screening:    General: Screening Current      Diabetes Screening:     General: Screening Current      Colorectal Cancer Screening:     General: Screening Current      Breast Cancer Screening:     General: Screening Current      Cervical Cancer Screening:    General: Screening Not Indicated      Osteoporosis Screening:    General: Risks and Benefits Discussed and Patient Declines      Abdominal Aortic Aneurysm (AAA) Screening:        General: Screening Not Indicated      Lung Cancer Screening:     General: Screening Not Indicated      Hepatitis C Screening:    General: Screening Current    Other Counseling Topics:   Car/seat belt/driving safety, sunscreen and regular weightbearing exercise and calcium and vitamin D intake   Immunizations discussed      Alicia Apple DO

## 2020-07-27 NOTE — ASSESSMENT & PLAN NOTE
-suppressed TSH with elevated FT4  -decrease levothyroxine to 112mcg daily with repeat TFTs in 6 weeks

## 2020-07-27 NOTE — PATIENT INSTRUCTIONS
Medicare Preventive Visit Patient Instructions  Thank you for completing your Welcome to Medicare Visit or Medicare Annual Wellness Visit today  Your next wellness visit will be due in one year (7/27/2021)  The screening/preventive services that you may require over the next 5-10 years are detailed below  Some tests may not apply to you based off risk factors and/or age  Screening tests ordered at today's visit but not completed yet may show as past due  Also, please note that scanned in results may not display below  Preventive Screenings:  Service Recommendations Previous Testing/Comments   Colorectal Cancer Screening  * Colonoscopy    * Fecal Occult Blood Test (FOBT)/Fecal Immunochemical Test (FIT)  * Fecal DNA/Cologuard Test  * Flexible Sigmoidoscopy Age: 54-65 years old   Colonoscopy: every 10 years (may be performed more frequently if at higher risk)  OR  FOBT/FIT: every 1 year  OR  Cologuard: every 3 years  OR  Sigmoidoscopy: every 5 years  Screening may be recommended earlier than age 48 if at higher risk for colorectal cancer  Also, an individualized decision between you and your healthcare provider will decide whether screening between the ages of 74-80 would be appropriate  Colonoscopy: 01/16/2013  FOBT/FIT: Not on file  Cologuard: Not on file  Sigmoidoscopy: Not on file    Screening Current     Breast Cancer Screening Age: 36 years old  Frequency: every 1-2 years  Not required if history of left and right mastectomy Mammogram: 01/24/2020    Screening Current   Cervical Cancer Screening Between the ages of 21-29, pap smear recommended once every 3 years  Between the ages of 33-67, can perform pap smear with HPV co-testing every 5 years     Recommendations may differ for women with a history of total hysterectomy, cervical cancer, or abnormal pap smears in past  Pap Smear: Not on file    Screening Not Indicated   Hepatitis C Screening Once for adults born between 1945 and 1965  More frequently in patients at high risk for Hepatitis C Hep C Antibody: 10/14/2018    Screening Current   Diabetes Screening 1-2 times per year if you're at risk for diabetes or have pre-diabetes Fasting glucose: 96 mg/dL   A1C: 5 7 %    Screening Current   Cholesterol Screening Once every 5 years if you don't have a lipid disorder  May order more often based on risk factors  Lipid panel: 07/18/2020    Screening Current     Other Preventive Screenings Covered by Medicare:  1  Abdominal Aortic Aneurysm (AAA) Screening: covered once if your at risk  You're considered to be at risk if you have a family history of AAA  2  Lung Cancer Screening: covers low dose CT scan once per year if you meet all of the following conditions: (1) Age 50-69; (2) No signs or symptoms of lung cancer; (3) Current smoker or have quit smoking within the last 15 years; (4) You have a tobacco smoking history of at least 30 pack years (packs per day multiplied by number of years you smoked); (5) You get a written order from a healthcare provider  3  Glaucoma Screening: covered annually if you're considered high risk: (1) You have diabetes OR (2) Family history of glaucoma OR (3)  aged 48 and older OR (3)  American aged 72 and older  3  Osteoporosis Screening: covered every 2 years if you meet one of the following conditions: (1) You're estrogen deficient and at risk for osteoporosis based off medical history and other findings; (2) Have a vertebral abnormality; (3) On glucocorticoid therapy for more than 3 months; (4) Have primary hyperparathyroidism; (5) On osteoporosis medications and need to assess response to drug therapy  · Last bone density test (DXA Scan): Not on file  5  HIV Screening: covered annually if you're between the age of 12-76  Also covered annually if you are younger than 13 and older than 72 with risk factors for HIV infection   For pregnant patients, it is covered up to 3 times per pregnancy  Immunizations:  Immunization Recommendations   Influenza Vaccine Annual influenza vaccination during flu season is recommended for all persons aged >= 6 months who do not have contraindications   Pneumococcal Vaccine (Prevnar and Pneumovax)  * Prevnar = PCV13  * Pneumovax = PPSV23   Adults 25-60 years old: 1-3 doses may be recommended based on certain risk factors  Adults 72 years old: Prevnar (PCV13) vaccine recommended followed by Pneumovax (PPSV23) vaccine  If already received PPSV23 since turning 65, then PCV13 recommended at least one year after PPSV23 dose  Hepatitis B Vaccine 3 dose series if at intermediate or high risk (ex: diabetes, end stage renal disease, liver disease)   Tetanus (Td) Vaccine - COST NOT COVERED BY MEDICARE PART B Following completion of primary series, a booster dose should be given every 10 years to maintain immunity against tetanus  Td may also be given as tetanus wound prophylaxis  Tdap Vaccine - COST NOT COVERED BY MEDICARE PART B Recommended at least once for all adults  For pregnant patients, recommended with each pregnancy  Shingles Vaccine (Shingrix) - COST NOT COVERED BY MEDICARE PART B  2 shot series recommended in those aged 48 and above     Health Maintenance Due:      Topic Date Due    CRC Screening: Colonoscopy  01/16/2014    MAMMOGRAM  01/24/2021    Hepatitis C Screening  Completed     Immunizations Due:      Topic Date Due    DTaP,Tdap,and Td Vaccines (1 - Tdap) 05/16/1956    Influenza Vaccine  07/01/2020     Advance Directives   What are advance directives? Advance directives are legal documents that state your wishes and plans for medical care  These plans are made ahead of time in case you lose your ability to make decisions for yourself  Advance directives can apply to any medical decision, such as the treatments you want, and if you want to donate organs  What are the types of advance directives?   There are many types of advance directives, and each state has rules about how to use them  You may choose a combination of any of the following:  · Living will: This is a written record of the treatment you want  You can also choose which treatments you do not want, which to limit, and which to stop at a certain time  This includes surgery, medicine, IV fluid, and tube feedings  · Durable power of  for healthcare [place] [facility]): This is a written record that states who you want to make healthcare choices for you when you are unable to make them for yourself  This person, called a proxy, is usually a family member or a friend  You may choose more than 1 proxy  · Do not resuscitate (DNR) order:  A DNR order is used in case your heart stops beating or you stop breathing  It is a request not to have certain forms of treatment, such as CPR  A DNR order may be included in other types of advance directives  · Medical directive: This covers the care that you want if you are in a coma, near death, or unable to make decisions for yourself  You can list the treatments you want for each condition  Treatment may include pain medicine, surgery, blood transfusions, dialysis, IV or tube feedings, and a ventilator (breathing machine)  · Values history: This document has questions about your views, beliefs, and how you feel and think about life  This information can help others choose the care that you would choose  Why are advance directives important? An advance directive helps you control your care  Although spoken wishes may be used, it is better to have your wishes written down  Spoken wishes can be misunderstood, or not followed  Treatments may be given even if you do not want them  An advance directive may make it easier for your family to make difficult choices about your care  Urinary Incontinence   Urinary incontinence (UI)  is when you lose control of your bladder  UI develops because your bladder cannot store or empty urine properly   The 3 most common types of UI are stress incontinence, urge incontinence, or both  Medicines:   · May be given to help strengthen your bladder control  Report any side effects of medication to your healthcare provider  Do pelvic muscle exercises often:  Your pelvic muscles help you stop urinating  Squeeze these muscles tight for 5 seconds, then relax for 5 seconds  Gradually work up to squeezing for 10 seconds  Do 3 sets of 15 repetitions a day, or as directed  This will help strengthen your pelvic muscles and improve bladder control  Train your bladder:  Go to the bathroom at set times, such as every 2 hours, even if you do not feel the urge to go  You can also try to hold your urine when you feel the urge to go  For example, hold your urine for 5 minutes when you feel the urge to go  As that becomes easier, hold your urine for 10 minutes  Self-care:   · Keep a UI record  Write down how often you leak urine and how much you leak  Make a note of what you were doing when you leaked urine  · Drink liquids as directed  You may need to limit the amount of liquid you drink to help control your urine leakage  Do not drink any liquid right before you go to bed  Limit or do not have drinks that contain caffeine or alcohol  · Prevent constipation  Eat a variety of high-fiber foods  Good examples are high-fiber cereals, beans, vegetables, and whole-grain breads  Walking is the best way to trigger your intestines to have a bowel movement  · Exercise regularly and maintain a healthy weight  Weight loss and exercise will decrease pressure on your bladder and help you control your leakage  · Use a catheter as directed  to help empty your bladder  A catheter is a tiny, plastic tube that is put into your bladder to drain your urine  · Go to behavior therapy as directed  Behavior therapy may be used to help you learn to control your urge to urinate      Weight Management   Why it is important to manage your weight: Being overweight increases your risk of health conditions such as heart disease, high blood pressure, type 2 diabetes, and certain types of cancer  It can also increase your risk for osteoarthritis, sleep apnea, and other respiratory problems  Aim for a slow, steady weight loss  Even a small amount of weight loss can lower your risk of health problems  How to lose weight safely:  A safe and healthy way to lose weight is to eat fewer calories and get regular exercise  You can lose up about 1 pound a week by decreasing the number of calories you eat by 500 calories each day  Healthy meal plan for weight management:  A healthy meal plan includes a variety of foods, contains fewer calories, and helps you stay healthy  A healthy meal plan includes the following:  · Eat whole-grain foods more often  A healthy meal plan should contain fiber  Fiber is the part of grains, fruits, and vegetables that is not broken down by your body  Whole-grain foods are healthy and provide extra fiber in your diet  Some examples of whole-grain foods are whole-wheat breads and pastas, oatmeal, brown rice, and bulgur  · Eat a variety of vegetables every day  Include dark, leafy greens such as spinach, kale, zoila greens, and mustard greens  Eat yellow and orange vegetables such as carrots, sweet potatoes, and winter squash  · Eat a variety of fruits every day  Choose fresh or canned fruit (canned in its own juice or light syrup) instead of juice  Fruit juice has very little or no fiber  · Eat low-fat dairy foods  Drink fat-free (skim) milk or 1% milk  Eat fat-free yogurt and low-fat cottage cheese  Try low-fat cheeses such as mozzarella and other reduced-fat cheeses  · Choose meat and other protein foods that are low in fat  Choose beans or other legumes such as split peas or lentils  Choose fish, skinless poultry (chicken or turkey), or lean cuts of red meat (beef or pork)   Before you cook meat or poultry, cut off any visible fat    · Use less fat and oil  Try baking foods instead of frying them  Add less fat, such as margarine, sour cream, regular salad dressing and mayonnaise to foods  Eat fewer high-fat foods  Some examples of high-fat foods include french fries, doughnuts, ice cream, and cakes  · Eat fewer sweets  Limit foods and drinks that are high in sugar  This includes candy, cookies, regular soda, and sweetened drinks  Exercise:  Exercise at least 30 minutes per day on most days of the week  Some examples of exercise include walking, biking, dancing, and swimming  You can also fit in more physical activity by taking the stairs instead of the elevator or parking farther away from stores  Ask your healthcare provider about the best exercise plan for you  © Copyright [employer] 2018 Information is for End User's use only and may not be sold, redistributed or otherwise used for commercial purposes  All illustrations and images included in CareNotes® are the copyrighted property of [employer]  or [facility] Preventive Visit Patient Instructions  Thank you for completing your Welcome to Medicare Visit or Medicare Annual Wellness Visit today  Your next wellness visit will be due in one year (7/27/2021)  The screening/preventive services that you may require over the next 5-10 years are detailed below  Some tests may not apply to you based off risk factors and/or age  Screening tests ordered at today's visit but not completed yet may show as past due  Also, please note that scanned in results may not display below    Preventive Screenings:  Service Recommendations Previous Testing/Comments   Colorectal Cancer Screening  * Colonoscopy    * Fecal Occult Blood Test (FOBT)/Fecal Immunochemical Test (FIT)  * Fecal DNA/Cologuard Test  * Flexible Sigmoidoscopy Age: 54-65 years old   Colonoscopy: every 10 years (may be performed more frequently if at higher risk)  OR  FOBT/FIT: every 1 year  OR  Cologuard: every 3 years  OR  Sigmoidoscopy: every 5 years  Screening may be recommended earlier than age 48 if at higher risk for colorectal cancer  Also, an individualized decision between you and your healthcare provider will decide whether screening between the ages of 74-80 would be appropriate  Colonoscopy: 01/16/2013  FOBT/FIT: Not on file  Cologuard: Not on file  Sigmoidoscopy: Not on file    Screening Current     Breast Cancer Screening Age: 36 years old  Frequency: every 1-2 years  Not required if history of left and right mastectomy Mammogram: 01/24/2020    Screening Current   Cervical Cancer Screening Between the ages of 21-29, pap smear recommended once every 3 years  Between the ages of 33-67, can perform pap smear with HPV co-testing every 5 years  Recommendations may differ for women with a history of total hysterectomy, cervical cancer, or abnormal pap smears in past  Pap Smear: Not on file    Screening Not Indicated   Hepatitis C Screening Once for adults born between 1945 and 1965  More frequently in patients at high risk for Hepatitis C Hep C Antibody: 10/14/2018    Screening Current   Diabetes Screening 1-2 times per year if you're at risk for diabetes or have pre-diabetes Fasting glucose: 96 mg/dL   A1C: 5 7 %    Screening Current   Cholesterol Screening Once every 5 years if you don't have a lipid disorder  May order more often based on risk factors  Lipid panel: 07/18/2020    Screening Current     Other Preventive Screenings Covered by Medicare:  6  Abdominal Aortic Aneurysm (AAA) Screening: covered once if your at risk  You're considered to be at risk if you have a family history of AAA    7  Lung Cancer Screening: covers low dose CT scan once per year if you meet all of the following conditions: (1) Age 50-69; (2) No signs or symptoms of lung cancer; (3) Current smoker or have quit smoking within the last 15 years; (4) You have a tobacco smoking history of at least 30 pack years (packs per day multiplied by number of years you smoked); (5) You get a written order from a healthcare provider  8  Glaucoma Screening: covered annually if you're considered high risk: (1) You have diabetes OR (2) Family history of glaucoma OR (3)  aged 48 and older OR (3)  American aged 72 and older  5  Osteoporosis Screening: covered every 2 years if you meet one of the following conditions: (1) You're estrogen deficient and at risk for osteoporosis based off medical history and other findings; (2) Have a vertebral abnormality; (3) On glucocorticoid therapy for more than 3 months; (4) Have primary hyperparathyroidism; (5) On osteoporosis medications and need to assess response to drug therapy  · Last bone density test (DXA Scan): Not on file  10  HIV Screening: covered annually if you're between the age of 12-76  Also covered annually if you are younger than 13 and older than 72 with risk factors for HIV infection  For pregnant patients, it is covered up to 3 times per pregnancy  Immunizations:  Immunization Recommendations   Influenza Vaccine Annual influenza vaccination during flu season is recommended for all persons aged >= 6 months who do not have contraindications   Pneumococcal Vaccine (Prevnar and Pneumovax)  * Prevnar = PCV13  * Pneumovax = PPSV23   Adults 25-60 years old: 1-3 doses may be recommended based on certain risk factors  Adults 72 years old: Prevnar (PCV13) vaccine recommended followed by Pneumovax (PPSV23) vaccine  If already received PPSV23 since turning 65, then PCV13 recommended at least one year after PPSV23 dose  Hepatitis B Vaccine 3 dose series if at intermediate or high risk (ex: diabetes, end stage renal disease, liver disease)   Tetanus (Td) Vaccine - COST NOT COVERED BY MEDICARE PART B Following completion of primary series, a booster dose should be given every 10 years to maintain immunity against tetanus  Td may also be given as tetanus wound prophylaxis  Tdap Vaccine - COST NOT COVERED BY MEDICARE PART B Recommended at least once for all adults  For pregnant patients, recommended with each pregnancy  Shingles Vaccine (Shingrix) - COST NOT COVERED BY MEDICARE PART B  2 shot series recommended in those aged 48 and above     Health Maintenance Due:      Topic Date Due    CRC Screening: Colonoscopy  01/16/2014    MAMMOGRAM  01/24/2021    Hepatitis C Screening  Completed     Immunizations Due:      Topic Date Due    Influenza Vaccine  07/01/2020     Advance Directives   What are advance directives? Advance directives are legal documents that state your wishes and plans for medical care  These plans are made ahead of time in case you lose your ability to make decisions for yourself  Advance directives can apply to any medical decision, such as the treatments you want, and if you want to donate organs  What are the types of advance directives? There are many types of advance directives, and each state has rules about how to use them  You may choose a combination of any of the following:  · Living will: This is a written record of the treatment you want  You can also choose which treatments you do not want, which to limit, and which to stop at a certain time  This includes surgery, medicine, IV fluid, and tube feedings  · Durable power of  for healthcare [place] [facility]): This is a written record that states who you want to make healthcare choices for you when you are unable to make them for yourself  This person, called a proxy, is usually a family member or a friend  You may choose more than 1 proxy  · Do not resuscitate (DNR) order:  A DNR order is used in case your heart stops beating or you stop breathing  It is a request not to have certain forms of treatment, such as CPR  A DNR order may be included in other types of advance directives  · Medical directive:   This covers the care that you want if you are in a coma, near death, or unable to make decisions for yourself  You can list the treatments you want for each condition  Treatment may include pain medicine, surgery, blood transfusions, dialysis, IV or tube feedings, and a ventilator (breathing machine)  · Values history: This document has questions about your views, beliefs, and how you feel and think about life  This information can help others choose the care that you would choose  Why are advance directives important? An advance directive helps you control your care  Although spoken wishes may be used, it is better to have your wishes written down  Spoken wishes can be misunderstood, or not followed  Treatments may be given even if you do not want them  An advance directive may make it easier for your family to make difficult choices about your care  Urinary Incontinence   Urinary incontinence (UI)  is when you lose control of your bladder  UI develops because your bladder cannot store or empty urine properly  The 3 most common types of UI are stress incontinence, urge incontinence, or both  Medicines:   · May be given to help strengthen your bladder control  Report any side effects of medication to your healthcare provider  Do pelvic muscle exercises often:  Your pelvic muscles help you stop urinating  Squeeze these muscles tight for 5 seconds, then relax for 5 seconds  Gradually work up to squeezing for 10 seconds  Do 3 sets of 15 repetitions a day, or as directed  This will help strengthen your pelvic muscles and improve bladder control  Train your bladder:  Go to the bathroom at set times, such as every 2 hours, even if you do not feel the urge to go  You can also try to hold your urine when you feel the urge to go  For example, hold your urine for 5 minutes when you feel the urge to go  As that becomes easier, hold your urine for 10 minutes  Self-care:   · Keep a UI record  Write down how often you leak urine and how much you leak   Make a note of what you were doing when you leaked urine   · Drink liquids as directed  You may need to limit the amount of liquid you drink to help control your urine leakage  Do not drink any liquid right before you go to bed  Limit or do not have drinks that contain caffeine or alcohol  · Prevent constipation  Eat a variety of high-fiber foods  Good examples are high-fiber cereals, beans, vegetables, and whole-grain breads  Walking is the best way to trigger your intestines to have a bowel movement  · Exercise regularly and maintain a healthy weight  Weight loss and exercise will decrease pressure on your bladder and help you control your leakage  · Use a catheter as directed  to help empty your bladder  A catheter is a tiny, plastic tube that is put into your bladder to drain your urine  · Go to behavior therapy as directed  Behavior therapy may be used to help you learn to control your urge to urinate  Weight Management   Why it is important to manage your weight:  Being overweight increases your risk of health conditions such as heart disease, high blood pressure, type 2 diabetes, and certain types of cancer  It can also increase your risk for osteoarthritis, sleep apnea, and other respiratory problems  Aim for a slow, steady weight loss  Even a small amount of weight loss can lower your risk of health problems  How to lose weight safely:  A safe and healthy way to lose weight is to eat fewer calories and get regular exercise  You can lose up about 1 pound a week by decreasing the number of calories you eat by 500 calories each day  Healthy meal plan for weight management:  A healthy meal plan includes a variety of foods, contains fewer calories, and helps you stay healthy  A healthy meal plan includes the following:  · Eat whole-grain foods more often  A healthy meal plan should contain fiber  Fiber is the part of grains, fruits, and vegetables that is not broken down by your body   Whole-grain foods are healthy and provide extra fiber in your diet  Some examples of whole-grain foods are whole-wheat breads and pastas, oatmeal, brown rice, and bulgur  · Eat a variety of vegetables every day  Include dark, leafy greens such as spinach, kale, zoila greens, and mustard greens  Eat yellow and orange vegetables such as carrots, sweet potatoes, and winter squash  · Eat a variety of fruits every day  Choose fresh or canned fruit (canned in its own juice or light syrup) instead of juice  Fruit juice has very little or no fiber  · Eat low-fat dairy foods  Drink fat-free (skim) milk or 1% milk  Eat fat-free yogurt and low-fat cottage cheese  Try low-fat cheeses such as mozzarella and other reduced-fat cheeses  · Choose meat and other protein foods that are low in fat  Choose beans or other legumes such as split peas or lentils  Choose fish, skinless poultry (chicken or turkey), or lean cuts of red meat (beef or pork)  Before you cook meat or poultry, cut off any visible fat  · Use less fat and oil  Try baking foods instead of frying them  Add less fat, such as margarine, sour cream, regular salad dressing and mayonnaise to foods  Eat fewer high-fat foods  Some examples of high-fat foods include french fries, doughnuts, ice cream, and cakes  · Eat fewer sweets  Limit foods and drinks that are high in sugar  This includes candy, cookies, regular soda, and sweetened drinks  Exercise:  Exercise at least 30 minutes per day on most days of the week  Some examples of exercise include walking, biking, dancing, and swimming  You can also fit in more physical activity by taking the stairs instead of the elevator or parking farther away from stores  Ask your healthcare provider about the best exercise plan for you  © Copyright [employer] 2018 Information is for End User's use only and may not be sold, redistributed or otherwise used for commercial purposes   All illustrations and images included in CareNotes® are the copyrighted property of [employer]  or [facility]

## 2020-07-28 ENCOUNTER — OFFICE VISIT (OUTPATIENT)
Dept: HEMATOLOGY ONCOLOGY | Facility: CLINIC | Age: 75
End: 2020-07-28
Payer: COMMERCIAL

## 2020-07-28 VITALS
DIASTOLIC BLOOD PRESSURE: 72 MMHG | WEIGHT: 172.2 LBS | HEART RATE: 88 BPM | TEMPERATURE: 98 F | SYSTOLIC BLOOD PRESSURE: 130 MMHG | OXYGEN SATURATION: 97 % | BODY MASS INDEX: 30.51 KG/M2 | RESPIRATION RATE: 16 BRPM | HEIGHT: 63 IN

## 2020-07-28 DIAGNOSIS — D47.3 ESSENTIAL THROMBOCYTHEMIA (HCC): Primary | ICD-10-CM

## 2020-07-28 PROCEDURE — 4040F PNEUMOC VAC/ADMIN/RCVD: CPT | Performed by: INTERNAL MEDICINE

## 2020-07-28 PROCEDURE — 3075F SYST BP GE 130 - 139MM HG: CPT | Performed by: INTERNAL MEDICINE

## 2020-07-28 PROCEDURE — 99214 OFFICE O/P EST MOD 30 MIN: CPT | Performed by: INTERNAL MEDICINE

## 2020-07-28 PROCEDURE — 1036F TOBACCO NON-USER: CPT | Performed by: INTERNAL MEDICINE

## 2020-07-28 PROCEDURE — 3008F BODY MASS INDEX DOCD: CPT | Performed by: INTERNAL MEDICINE

## 2020-07-28 PROCEDURE — 1170F FXNL STATUS ASSESSED: CPT | Performed by: INTERNAL MEDICINE

## 2020-07-28 PROCEDURE — 3078F DIAST BP <80 MM HG: CPT | Performed by: INTERNAL MEDICINE

## 2020-07-28 PROCEDURE — 1125F AMNT PAIN NOTED PAIN PRSNT: CPT | Performed by: INTERNAL MEDICINE

## 2020-07-28 PROCEDURE — 1160F RVW MEDS BY RX/DR IN RCRD: CPT | Performed by: INTERNAL MEDICINE

## 2020-07-28 NOTE — PROGRESS NOTES
Hematology Outpatient Follow - Up Note  Jose Alejandro Wray 70 y o  female MRN: @ Encounter: [id number]        Date:  7/28/2020        Assessment/ Plan:    61-year-old diagnosed with essential thrombocythemia positive for JAK2 mutation, she is on hydroxyurea 500 mg every other day since 02/2020, platelet count in the range of 600,000, continue treatment    Easy bruisability, reduce aspirin to 81 mg p o  Daily    Follow-up in 6 months with CBC, CMP            HPI:  61-year-old  female who was seen initially 6/2017 regarding persistent thrombocytosis going back to at least March 2015  May 2017 WBC 9 3, hemoglobin 15 1, MCV 91, platelets 678,000, 97% neutrophils, 19% lymphocytes, 9% monocytes, 2% eosinophils  November 2016 WBC 9 6, hemoglobin 15 1, platelets 876,311   June 2016 platelets 884,357   March 2016 platelets 500,407   March 2015 platelets 165,674  She has a history of high blood pressure, hypothyroidism,  cholecystectomy in March 2016   She is a smoker - used to smoke half pack of cigarettes every day for 40 years quit in 2014   She does not drink alcohol   History negative for blood disorders of cancer   FADIA, rheumatoid factor normal   Ultrasound of the abdomen did not identify hepatosplenomegaly   DEAN 2 mutation present      Hydroxyurea 500 mg every other day initiated on 02/2020, aspirin 160 mg p o  Daily    Interval History:  Easy bruisability of the upper arms        Previous Treatment:         Test Results:    Imaging: No results found      Labs:   Lab Results   Component Value Date    WBC 8 78 07/18/2020    HGB 15 4 07/18/2020    HCT 46 6 (H) 07/18/2020    MCV 99 (H) 07/18/2020     (H) 07/18/2020     Lab Results   Component Value Date     (L) 12/29/2015    K 4 7 07/18/2020     07/18/2020    CO2 26 07/18/2020    ANIONGAP 13 12/29/2015    BUN 10 07/18/2020    CREATININE 0 76 07/18/2020    GLUCOSE 172 (H) 12/29/2015    GLUF 96 07/18/2020    CALCIUM 10 3 (H) 07/18/2020 CORRECTEDCA 10 5 (H) 07/18/2020    AST 14 07/18/2020    ALT 21 07/18/2020    ALKPHOS 88 07/18/2020    PROT 7 9 09/05/2015    BILITOT 0 47 09/05/2015    EGFR 78 07/18/2020       No results found for: IRON, TIBC, FERRITIN    No results found for: [id number]      ROS: Review of Systems   Constitutional: Negative for appetite change, chills, diaphoresis, fatigue and unexpected weight change  HENT:   Negative for mouth sores, nosebleeds, sore throat, trouble swallowing and voice change  Eyes: Negative for eye problems and icterus  Respiratory: Negative for chest tightness, cough, hemoptysis and wheezing  Cardiovascular: Negative for chest pain, leg swelling and palpitations  Gastrointestinal: Negative for abdominal distention, abdominal pain, blood in stool, constipation, diarrhea, nausea and vomiting  Endocrine: Negative for hot flashes  Genitourinary: Negative for bladder incontinence, difficulty urinating, dyspareunia, dysuria and frequency  Musculoskeletal: Negative for arthralgias, back pain, gait problem, neck pain and neck stiffness  Skin: Negative for itching and rash  Neurological: Negative for dizziness, gait problem, headaches, numbness, seizures and speech difficulty  Hematological: Negative for adenopathy  Bruises/bleeds easily  Psychiatric/Behavioral: Negative for decreased concentration, depression, sleep disturbance and suicidal ideas  The patient is not nervous/anxious  Current Medications: Reviewed  Allergies: Reviewed  PMH/FH/SH:  Reviewed      Physical Exam:    Body surface area is 1 81 meters squared      Wt Readings from Last 3 Encounters:   07/28/20 78 1 kg (172 lb 3 2 oz)   07/27/20 79 3 kg (174 lb 12 8 oz)   03/17/20 81 6 kg (180 lb)        Temp Readings from Last 3 Encounters:   07/28/20 98 °F (36 7 °C) (Tympanic)   07/27/20 98 8 °F (37 1 °C)   03/17/20 97 8 °F (36 6 °C) (Tympanic)        BP Readings from Last 3 Encounters:   07/28/20 130/72   07/27/20 122/80 20 134/80         Pulse Readings from Last 3 Encounters:   20 88   20 87   20 87        Physical Exam   Constitutional: She is oriented to person, place, and time  She appears well-developed and well-nourished  No distress  HENT:   Head: Normocephalic and atraumatic  Eyes: Conjunctivae are normal    Neck: Normal range of motion  Neck supple  No tracheal deviation present  Cardiovascular: Normal rate and regular rhythm  Exam reveals no gallop and no friction rub  No murmur heard  Pulmonary/Chest: Effort normal and breath sounds normal  No respiratory distress  She has no wheezes  She has no rales  She exhibits no tenderness  Abdominal: Soft  She exhibits no distension  There is no tenderness  Musculoskeletal: She exhibits no edema or tenderness  Lymphadenopathy:     She has no cervical adenopathy  Neurological: She is alert and oriented to person, place, and time  Skin: Skin is warm and dry  She is not diaphoretic  No erythema  No pallor  Scattered ecchymosis of the upper arms up to 2 cm   Psychiatric: She has a normal mood and affect  Her behavior is normal  Judgment and thought content normal    Vitals reviewed  ECO    Goals and Barriers:  Current Goal: Minimize effects of disease  Barriers: None  Patient's Capacity to Self Care:  Patient is able to self care      Code Status: [unfilled]

## 2020-08-01 ENCOUNTER — APPOINTMENT (OUTPATIENT)
Dept: LAB | Age: 75
End: 2020-08-01
Payer: COMMERCIAL

## 2020-08-01 DIAGNOSIS — E83.52 HYPERCALCEMIA: ICD-10-CM

## 2020-08-01 LAB
ANION GAP SERPL CALCULATED.3IONS-SCNC: 7 MMOL/L (ref 4–13)
BUN SERPL-MCNC: 13 MG/DL (ref 5–25)
CALCIUM SERPL-MCNC: 10 MG/DL (ref 8.3–10.1)
CHLORIDE SERPL-SCNC: 103 MMOL/L (ref 100–108)
CO2 SERPL-SCNC: 26 MMOL/L (ref 21–32)
CREAT SERPL-MCNC: 0.79 MG/DL (ref 0.6–1.3)
GFR SERPL CREATININE-BSD FRML MDRD: 74 ML/MIN/1.73SQ M
GLUCOSE P FAST SERPL-MCNC: 97 MG/DL (ref 65–99)
POTASSIUM SERPL-SCNC: 3.5 MMOL/L (ref 3.5–5.3)
SODIUM SERPL-SCNC: 136 MMOL/L (ref 136–145)

## 2020-08-01 PROCEDURE — 80048 BASIC METABOLIC PNL TOTAL CA: CPT

## 2020-08-01 PROCEDURE — 36415 COLL VENOUS BLD VENIPUNCTURE: CPT

## 2020-08-07 DIAGNOSIS — I10 BENIGN ESSENTIAL HYPERTENSION: ICD-10-CM

## 2020-08-07 RX ORDER — LISINOPRIL 20 MG/1
20 TABLET ORAL DAILY
Qty: 90 TABLET | Refills: 1 | Status: SHIPPED | OUTPATIENT
Start: 2020-08-07 | End: 2021-02-23 | Stop reason: SDUPTHER

## 2020-08-27 DIAGNOSIS — I10 BENIGN HYPERTENSION: ICD-10-CM

## 2020-08-27 DIAGNOSIS — I10 BENIGN ESSENTIAL HYPERTENSION: ICD-10-CM

## 2020-08-27 RX ORDER — AMLODIPINE BESYLATE 5 MG/1
10 TABLET ORAL DAILY
Qty: 180 TABLET | Refills: 1 | Status: SHIPPED | OUTPATIENT
Start: 2020-08-27 | End: 2021-02-23 | Stop reason: SDUPTHER

## 2020-09-12 ENCOUNTER — APPOINTMENT (OUTPATIENT)
Dept: LAB | Age: 75
End: 2020-09-12
Payer: COMMERCIAL

## 2020-09-12 DIAGNOSIS — E03.9 ACQUIRED HYPOTHYROIDISM: ICD-10-CM

## 2020-09-12 LAB — TSH SERPL DL<=0.05 MIU/L-ACNC: 0.46 UIU/ML (ref 0.36–3.74)

## 2020-09-12 PROCEDURE — 36415 COLL VENOUS BLD VENIPUNCTURE: CPT

## 2020-09-12 PROCEDURE — 84443 ASSAY THYROID STIM HORMONE: CPT

## 2020-09-21 DIAGNOSIS — E03.9 ACQUIRED HYPOTHYROIDISM: ICD-10-CM

## 2020-09-21 RX ORDER — LEVOTHYROXINE SODIUM 112 UG/1
112 TABLET ORAL DAILY
Qty: 90 TABLET | Refills: 1 | Status: SHIPPED | OUTPATIENT
Start: 2020-09-21 | End: 2021-03-30 | Stop reason: SDUPTHER

## 2020-09-28 ENCOUNTER — TELEPHONE (OUTPATIENT)
Dept: HEMATOLOGY ONCOLOGY | Facility: MEDICAL CENTER | Age: 75
End: 2020-09-28

## 2020-09-28 DIAGNOSIS — D47.3 ESSENTIAL THROMBOCYTHEMIA (HCC): ICD-10-CM

## 2020-09-28 RX ORDER — HYDROXYUREA 500 MG/1
CAPSULE ORAL
Qty: 30 CAPSULE | Refills: 2 | Status: SHIPPED | OUTPATIENT
Start: 2020-09-28 | End: 2021-03-30 | Stop reason: SDUPTHER

## 2020-09-28 NOTE — TELEPHONE ENCOUNTER
daMedication Refill     Who is Calling  patient    Medication hydroxyurea (HYDREA) 500 mg capsule        How many pills left 5        Preferred Pharmacy [facility]        Call back number [phone]   Relevant Information

## 2020-09-28 NOTE — TELEPHONE ENCOUNTER
Per most recent OV note:  Assessment/ Plan:    68-year-old diagnosed with essential thrombocythemia positive for JAK2 mutation, she is on hydroxyurea 500 mg every other day since 02/2020, platelet count in the range of 600,000, continue treatment     Easy bruisability, reduce aspirin to 81 mg p o  Daily     Follow-up in 6 months with CBC, CMP      Confirmed hydrea dose with patient, will pend to Dr Colin Armijo to sign

## 2021-01-04 NOTE — PROGRESS NOTES
Assessment/Plan:    Problem List Items Addressed This Visit        Other    Insomnia     -difficulty falling and maintaining sleep  -improved recently  -suspect due to underlying anxiety           Other Visit Diagnoses     Preoperative clearance    -  Primary    -pt is low risk of developing a major adverse clinical event for low risk cataract procedure    Blurry vision        Senile cataract of right eye, unspecified age-related cataract type        -scheduled for R eye cataract surgery on 1/14/21 by Dr Lisa Hughes    Relevant Medications    ofloxacin (OCUFLOX) 0 3 % ophthalmic solution    prednisoLONE acetate (PRED FORTE) 1 % ophthalmic suspension    Senile cataract of left eye, unspecified age-related cataract type        -scheduled for L eye cataract surgery on 2/4/21 by Dr Lisa Hughes    Relevant Medications    ofloxacin (OCUFLOX) 0 3 % ophthalmic solution    prednisoLONE acetate (PRED FORTE) 1 % ophthalmic suspension    Encounter for electrocardiogram        Relevant Orders    POCT ECG (Completed)          Subjective:      Patient ID: Jorge Vo is a 70 y o  female  HPI  79yo female with hypothyroidism, HTN, ET, fatty liver, prediabetes, insomnia for preop evaluation  Jorge Vo is a 70 y o  female who presents to the office today for a preoperative consultation at the request of surgeon Dr Laurel Hernandez who plans on performing R eye cataract on January 14 and L eye cataract on February 4  Type of anesthesia to be used monitored anesthesia  Prior anesthesia adverse reactions no  Reports blurry vision and difficulty driving in the dark  Reports she is sleeping better lately  Previously she was waking up frequently, suspect because she would go to bed thinking about things but now is thinking about more pleasant things instead and is able to fall asleep easier        Exercise capacity - Able to walk 4 blocks or climb 2 flights of stairs without symptoms - yes    Easy bleeding/bruising - no    Chest pain - no    Dyspnea, wheezing, cough - no    Sleep apnea - no    Lifestyle:   reports no history of alcohol use  reports that she quit smoking about 9 years ago  She has a 20 00 pack-year smoking history  She has never used smokeless tobacco    reports no history of drug use  The following portions of the patient's history were reviewed and updated as appropriate: allergies, current medications, past family history, past medical history, past social history, past surgical history and problem list     Current Outpatient Medications:     amLODIPine (NORVASC) 5 mg tablet, Take 2 tablets (10 mg total) by mouth daily, Disp: 180 tablet, Rfl: 1    aspirin 81 MG tablet, Take 81 mg by mouth daily  , Disp: , Rfl:     hydrochlorothiazide (MICROZIDE) 12 5 mg capsule, Take 1 capsule (12 5 mg total) by mouth every morning, Disp: 90 capsule, Rfl: 2    hydroxyurea (HYDREA) 500 mg capsule, Take 1 by mouth every other day, Disp: 30 capsule, Rfl: 2    KLOR-CON M20 20 MEQ tablet, Take 1 tablet (20 mEq total) by mouth daily, Disp: 90 tablet, Rfl: 1    levothyroxine 112 mcg tablet, Take 1 tablet (112 mcg total) by mouth daily, Disp: 90 tablet, Rfl: 1    lisinopril (ZESTRIL) 20 mg tablet, Take 1 tablet (20 mg total) by mouth daily, Disp: 90 tablet, Rfl: 1    Multiple Vitamin (MULTIVITAMIN) capsule, Take 1 capsule by mouth daily  , Disp: , Rfl:     ofloxacin (OCUFLOX) 0 3 % ophthalmic solution, , Disp: , Rfl:     prednisoLONE acetate (PRED FORTE) 1 % ophthalmic suspension, , Disp: , Rfl:     Review of Systems   HENT: Negative for congestion, postnasal drip, rhinorrhea and sore throat  Eyes: Positive for photophobia, itching and visual disturbance  Negative for pain  Respiratory: Negative for apnea, cough, chest tightness and wheezing  Cardiovascular: Negative for chest pain, palpitations and leg swelling  Gastrointestinal: Negative for abdominal pain, constipation, diarrhea and nausea     Genitourinary:        Urinary incontinence   Musculoskeletal: Positive for arthralgias (hip pain)  Neurological: Negative for dizziness, weakness, numbness and headaches  Hematological: Does not bruise/bleed easily  Psychiatric/Behavioral: Negative for decreased concentration, dysphoric mood and sleep disturbance  The patient is not nervous/anxious  Objective:    /80   Pulse 98   Temp 98 9 °F (37 2 °C) (Tympanic)   Resp 16   Ht 5' 3" (1 6 m)   Wt 78 2 kg (172 lb 6 4 oz)   SpO2 97%   BMI 30 54 kg/m²      Physical Exam  Vitals signs reviewed  Constitutional:       General: She is not in acute distress  Appearance: Normal appearance  She is obese  She is not ill-appearing  HENT:      Head: Normocephalic  Right Ear: Tympanic membrane, ear canal and external ear normal  There is no impacted cerumen  Left Ear: Tympanic membrane, ear canal and external ear normal  There is no impacted cerumen  Nose: Nose normal  No congestion or rhinorrhea  Mouth/Throat:      Mouth: Mucous membranes are moist       Pharynx: Oropharynx is clear  No oropharyngeal exudate or posterior oropharyngeal erythema  Eyes:      Extraocular Movements: Extraocular movements intact  Conjunctiva/sclera: Conjunctivae normal       Pupils: Pupils are equal, round, and reactive to light  Comments: BL cataracts present   Neck:      Musculoskeletal: Neck supple  No muscular tenderness  Thyroid: No thyromegaly or thyroid tenderness  Cardiovascular:      Rate and Rhythm: Normal rate and regular rhythm  Pulses: Normal pulses  Heart sounds: Normal heart sounds  Pulmonary:      Effort: Pulmonary effort is normal  No respiratory distress  Breath sounds: No wheezing  Abdominal:      General: Bowel sounds are normal  There is no distension  Palpations: Abdomen is soft  Musculoskeletal: Normal range of motion  Right lower leg: No edema  Left lower leg: No edema     Lymphadenopathy: Cervical: No cervical adenopathy  Skin:     General: Skin is dry  Coloration: Skin is not jaundiced or pale  Findings: No bruising  Neurological:      General: No focal deficit present  Mental Status: She is alert and oriented to person, place, and time  Psychiatric:         Attention and Perception: Attention normal          Mood and Affect: Mood and affect normal          Speech: Speech normal          Behavior: Behavior is cooperative           EKG: NSR at 90bpm, normal QRS, LAD, no acute changes

## 2021-01-05 ENCOUNTER — OFFICE VISIT (OUTPATIENT)
Dept: INTERNAL MEDICINE CLINIC | Facility: CLINIC | Age: 76
End: 2021-01-05
Payer: COMMERCIAL

## 2021-01-05 VITALS
RESPIRATION RATE: 16 BRPM | HEART RATE: 98 BPM | HEIGHT: 63 IN | OXYGEN SATURATION: 97 % | SYSTOLIC BLOOD PRESSURE: 126 MMHG | WEIGHT: 172.4 LBS | BODY MASS INDEX: 30.55 KG/M2 | TEMPERATURE: 98.9 F | DIASTOLIC BLOOD PRESSURE: 80 MMHG

## 2021-01-05 DIAGNOSIS — Z00.8 ENCOUNTER FOR ELECTROCARDIOGRAM: ICD-10-CM

## 2021-01-05 DIAGNOSIS — H25.9 SENILE CATARACT OF LEFT EYE, UNSPECIFIED AGE-RELATED CATARACT TYPE: ICD-10-CM

## 2021-01-05 DIAGNOSIS — G47.00 INSOMNIA, UNSPECIFIED TYPE: ICD-10-CM

## 2021-01-05 DIAGNOSIS — H25.9 SENILE CATARACT OF RIGHT EYE, UNSPECIFIED AGE-RELATED CATARACT TYPE: ICD-10-CM

## 2021-01-05 DIAGNOSIS — Z01.818 PREOPERATIVE CLEARANCE: Primary | ICD-10-CM

## 2021-01-05 DIAGNOSIS — H53.8 BLURRY VISION: ICD-10-CM

## 2021-01-05 PROCEDURE — 93000 ELECTROCARDIOGRAM COMPLETE: CPT | Performed by: INTERNAL MEDICINE

## 2021-01-05 PROCEDURE — 3074F SYST BP LT 130 MM HG: CPT | Performed by: INTERNAL MEDICINE

## 2021-01-05 PROCEDURE — 3008F BODY MASS INDEX DOCD: CPT | Performed by: INTERNAL MEDICINE

## 2021-01-05 PROCEDURE — 3079F DIAST BP 80-89 MM HG: CPT | Performed by: INTERNAL MEDICINE

## 2021-01-05 PROCEDURE — 1036F TOBACCO NON-USER: CPT | Performed by: INTERNAL MEDICINE

## 2021-01-05 PROCEDURE — 99214 OFFICE O/P EST MOD 30 MIN: CPT | Performed by: INTERNAL MEDICINE

## 2021-01-05 PROCEDURE — 1160F RVW MEDS BY RX/DR IN RCRD: CPT | Performed by: INTERNAL MEDICINE

## 2021-01-05 RX ORDER — PREDNISOLONE ACETATE 10 MG/ML
SUSPENSION/ DROPS OPHTHALMIC
COMMUNITY
Start: 2020-12-23 | End: 2022-01-31 | Stop reason: HOSPADM

## 2021-01-05 RX ORDER — OFLOXACIN 3 MG/ML
SOLUTION/ DROPS OPHTHALMIC
COMMUNITY
Start: 2020-12-23 | End: 2021-10-19

## 2021-01-12 ENCOUNTER — TELEPHONE (OUTPATIENT)
Dept: HEMATOLOGY ONCOLOGY | Facility: CLINIC | Age: 76
End: 2021-01-12

## 2021-01-12 NOTE — TELEPHONE ENCOUNTER
Appointment Cancellation Or Reschedule     Person calling in Patient    Provider [facility] Visit Date and Time  02/02 at [address]   Did patient reschedule their appointment? no   Is this patient a treatment patient? no   When is their next infusion visit? N/a   Reason for Cancellation Patient will be getting surgery, will call back to nakia      If the patient is a treatment patient, please route this to the office nurse

## 2021-02-01 ENCOUNTER — TELEPHONE (OUTPATIENT)
Dept: HEMATOLOGY ONCOLOGY | Facility: CLINIC | Age: 76
End: 2021-02-01

## 2021-02-01 NOTE — TELEPHONE ENCOUNTER
Lm on vm advising office will be closed tomorrow 2/1/21  Patient appt has been r/s to 2/12/21 at 8am with Aicha Gonsales due to patient not having recent labs done

## 2021-02-03 ENCOUNTER — TELEPHONE (OUTPATIENT)
Dept: HEMATOLOGY ONCOLOGY | Facility: CLINIC | Age: 76
End: 2021-02-03

## 2021-02-03 NOTE — TELEPHONE ENCOUNTER
Appointment Cancellation Or Reschedule     Person calling in Patient     Provider Obdulio Tracey   Office Visit Date and Time 2/12/21 8am   Office Visit Location [facility]    Did patient reschedule their appointment? no   Is this patient a treatment patient? no   When is their next infusion visit? N/a    Reason for Cancellation or Reschedule will call back to resched     If the patient is a treatment patient, please route this to the office nurse

## 2021-02-11 ENCOUNTER — TELEPHONE (OUTPATIENT)
Dept: HEMATOLOGY ONCOLOGY | Facility: CLINIC | Age: 76
End: 2021-02-11

## 2021-02-11 NOTE — TELEPHONE ENCOUNTER
Patient has 8:00 a m f/u apt tomorrow Friday 02/12/201 w/Silvina Echols at the [facility]  Called pt and left message to call the office to do covid pre screening questionnaire, [phone]

## 2021-02-23 DIAGNOSIS — I10 BENIGN ESSENTIAL HYPERTENSION: ICD-10-CM

## 2021-02-23 DIAGNOSIS — I10 BENIGN HYPERTENSION: ICD-10-CM

## 2021-02-23 RX ORDER — AMLODIPINE BESYLATE 5 MG/1
10 TABLET ORAL DAILY
Qty: 180 TABLET | Refills: 1 | Status: SHIPPED | OUTPATIENT
Start: 2021-02-23 | End: 2021-08-31 | Stop reason: SDUPTHER

## 2021-02-23 RX ORDER — LISINOPRIL 20 MG/1
20 TABLET ORAL DAILY
Qty: 90 TABLET | Refills: 1 | Status: SHIPPED | OUTPATIENT
Start: 2021-02-23 | End: 2021-08-19 | Stop reason: SDUPTHER

## 2021-03-05 ENCOUNTER — TELEPHONE (OUTPATIENT)
Dept: HEMATOLOGY ONCOLOGY | Facility: MEDICAL CENTER | Age: 76
End: 2021-03-05

## 2021-03-05 NOTE — TELEPHONE ENCOUNTER
Scheduling Appointment     Who Is Calling to Schedule patient   Doctor Onesimo   Location    Date and Time 03/23/2021         Patient verbalized understanding

## 2021-03-09 ENCOUNTER — RA CDI HCC (OUTPATIENT)
Dept: OTHER | Facility: HOSPITAL | Age: 76
End: 2021-03-09

## 2021-03-09 NOTE — PROGRESS NOTES
ased on clinical documentation indicated in your record, it appears that the patient may have the following conditions:    D47 3 Essential thrombocythemia     If this is correct, please document and assess at your next visit March 16th  [facility] 75  coding oppertunities             Chart reviewed, (number of) suggestions sent to provider: 1                 NO RESPONSE

## 2021-03-13 ENCOUNTER — APPOINTMENT (OUTPATIENT)
Dept: LAB | Age: 76
End: 2021-03-13
Payer: COMMERCIAL

## 2021-03-13 DIAGNOSIS — D47.3 ESSENTIAL THROMBOCYTHEMIA (HCC): ICD-10-CM

## 2021-03-13 DIAGNOSIS — E03.9 ACQUIRED HYPOTHYROIDISM: ICD-10-CM

## 2021-03-13 DIAGNOSIS — I10 BENIGN ESSENTIAL HYPERTENSION: ICD-10-CM

## 2021-03-13 LAB
ALBUMIN SERPL BCP-MCNC: 3.9 G/DL (ref 3.5–5)
ALP SERPL-CCNC: 97 U/L (ref 46–116)
ALT SERPL W P-5'-P-CCNC: 22 U/L (ref 12–78)
ANION GAP SERPL CALCULATED.3IONS-SCNC: 3 MMOL/L (ref 4–13)
AST SERPL W P-5'-P-CCNC: 13 U/L (ref 5–45)
BASOPHILS # BLD AUTO: 0.08 THOUSANDS/ΜL (ref 0–0.1)
BASOPHILS NFR BLD AUTO: 1 % (ref 0–1)
BILIRUB SERPL-MCNC: 0.61 MG/DL (ref 0.2–1)
BUN SERPL-MCNC: 15 MG/DL (ref 5–25)
CALCIUM SERPL-MCNC: 10 MG/DL (ref 8.3–10.1)
CHLORIDE SERPL-SCNC: 107 MMOL/L (ref 100–108)
CO2 SERPL-SCNC: 28 MMOL/L (ref 21–32)
CREAT SERPL-MCNC: 0.82 MG/DL (ref 0.6–1.3)
EOSINOPHIL # BLD AUTO: 0.17 THOUSAND/ΜL (ref 0–0.61)
EOSINOPHIL NFR BLD AUTO: 2 % (ref 0–6)
ERYTHROCYTE [DISTWIDTH] IN BLOOD BY AUTOMATED COUNT: 13.9 % (ref 11.6–15.1)
GFR SERPL CREATININE-BSD FRML MDRD: 71 ML/MIN/1.73SQ M
GLUCOSE P FAST SERPL-MCNC: 96 MG/DL (ref 65–99)
HCT VFR BLD AUTO: 46 % (ref 34.8–46.1)
HGB BLD-MCNC: 15.1 G/DL (ref 11.5–15.4)
IMM GRANULOCYTES # BLD AUTO: 0.04 THOUSAND/UL (ref 0–0.2)
IMM GRANULOCYTES NFR BLD AUTO: 1 % (ref 0–2)
LYMPHOCYTES # BLD AUTO: 1.76 THOUSANDS/ΜL (ref 0.6–4.47)
LYMPHOCYTES NFR BLD AUTO: 22 % (ref 14–44)
MCH RBC QN AUTO: 33 PG (ref 26.8–34.3)
MCHC RBC AUTO-ENTMCNC: 32.8 G/DL (ref 31.4–37.4)
MCV RBC AUTO: 101 FL (ref 82–98)
MONOCYTES # BLD AUTO: 0.61 THOUSAND/ΜL (ref 0.17–1.22)
MONOCYTES NFR BLD AUTO: 8 % (ref 4–12)
NEUTROPHILS # BLD AUTO: 5.28 THOUSANDS/ΜL (ref 1.85–7.62)
NEUTS SEG NFR BLD AUTO: 66 % (ref 43–75)
NRBC BLD AUTO-RTO: 0 /100 WBCS
PLATELET # BLD AUTO: 562 THOUSANDS/UL (ref 149–390)
PMV BLD AUTO: 9.3 FL (ref 8.9–12.7)
POTASSIUM SERPL-SCNC: 4.4 MMOL/L (ref 3.5–5.3)
PROT SERPL-MCNC: 7.5 G/DL (ref 6.4–8.2)
RBC # BLD AUTO: 4.57 MILLION/UL (ref 3.81–5.12)
SODIUM SERPL-SCNC: 138 MMOL/L (ref 136–145)
TSH SERPL DL<=0.05 MIU/L-ACNC: 1.06 UIU/ML (ref 0.36–3.74)
WBC # BLD AUTO: 7.94 THOUSAND/UL (ref 4.31–10.16)

## 2021-03-13 PROCEDURE — 84443 ASSAY THYROID STIM HORMONE: CPT

## 2021-03-13 PROCEDURE — 85025 COMPLETE CBC W/AUTO DIFF WBC: CPT

## 2021-03-13 PROCEDURE — 80053 COMPREHEN METABOLIC PANEL: CPT

## 2021-03-13 PROCEDURE — 36415 COLL VENOUS BLD VENIPUNCTURE: CPT

## 2021-03-16 ENCOUNTER — OFFICE VISIT (OUTPATIENT)
Dept: INTERNAL MEDICINE CLINIC | Facility: CLINIC | Age: 76
End: 2021-03-16
Payer: COMMERCIAL

## 2021-03-16 VITALS
HEIGHT: 63 IN | BODY MASS INDEX: 31.01 KG/M2 | HEART RATE: 96 BPM | SYSTOLIC BLOOD PRESSURE: 122 MMHG | DIASTOLIC BLOOD PRESSURE: 80 MMHG | RESPIRATION RATE: 16 BRPM | WEIGHT: 175 LBS | OXYGEN SATURATION: 97 %

## 2021-03-16 DIAGNOSIS — E83.52 HYPERCALCEMIA: ICD-10-CM

## 2021-03-16 DIAGNOSIS — E03.9 ACQUIRED HYPOTHYROIDISM: Primary | ICD-10-CM

## 2021-03-16 DIAGNOSIS — I10 BENIGN ESSENTIAL HYPERTENSION: ICD-10-CM

## 2021-03-16 DIAGNOSIS — D47.3 ESSENTIAL THROMBOCYTHEMIA (HCC): ICD-10-CM

## 2021-03-16 PROCEDURE — 3725F SCREEN DEPRESSION PERFORMED: CPT | Performed by: INTERNAL MEDICINE

## 2021-03-16 PROCEDURE — 99214 OFFICE O/P EST MOD 30 MIN: CPT | Performed by: INTERNAL MEDICINE

## 2021-03-16 NOTE — PROGRESS NOTES
Assessment/Plan:    Problem List Items Addressed This Visit        Endocrine    Hypothyroidism - Primary     -TSH normalized  -continue on levothyroxine 112mcg daily            Cardiovascular and Mediastinum    Benign essential hypertension     -normotensive  -continue present regimen of amlodipine 10mg daily, hctz 12 5mg daily and lisinopril 20mg daily  -monitor BMP            Hematopoietic and Hemostatic    Essential thrombocythemia (HCC)     -platelets count continue to decrease  -on hydroxyurea 500mg daily, she is followed by Mihir            Other    Hypercalcemia     -improved with decreased milk intake  -monitor               Subjective:      Patient ID: Neno Moore is a 70 y o  female  79yo female with insomnia, HTN, fatty liver, prediabetes, ET and hypothyroidism here for follow up care  Thyroid Problem  Presents for follow-up visit  Symptoms include diaphoresis and fatigue  Patient reports no constipation, diarrhea, leg swelling, palpitations, weight gain or weight loss  The symptoms have been stable  Hypertension  This is a chronic problem  The current episode started more than 1 year ago  The problem is controlled  Associated symptoms include sweats  Pertinent negatives include no chest pain, headaches, palpitations, peripheral edema or shortness of breath  Risk factors for coronary artery disease include sedentary lifestyle, post-menopausal state and obesity  Past treatments include ACE inhibitors, calcium channel blockers and diuretics  Identifiable causes of hypertension include a thyroid problem  She is followed for ET, platelet counts are decreasing on hydroxyurea  She reports night sweats, mild fatigue      The following portions of the patient's history were reviewed and updated as appropriate: allergies, current medications, past family history, past medical history, past social history, past surgical history and problem list       Current Outpatient Medications:     amLODIPine (NORVASC) 5 mg tablet, Take 2 tablets (10 mg total) by mouth daily, Disp: 180 tablet, Rfl: 1    aspirin 81 MG tablet, Take 81 mg by mouth daily  , Disp: , Rfl:     hydrochlorothiazide (MICROZIDE) 12 5 mg capsule, Take 1 capsule (12 5 mg total) by mouth every morning, Disp: 90 capsule, Rfl: 2    hydroxyurea (HYDREA) 500 mg capsule, Take 1 by mouth every other day, Disp: 30 capsule, Rfl: 2    KLOR-CON M20 20 MEQ tablet, Take 1 tablet (20 mEq total) by mouth daily (Patient taking differently: Take 20 mEq by mouth daily Take 1/2 tablet daily), Disp: 90 tablet, Rfl: 1    levothyroxine 112 mcg tablet, Take 1 tablet (112 mcg total) by mouth daily, Disp: 90 tablet, Rfl: 1    lisinopril (ZESTRIL) 20 mg tablet, Take 1 tablet (20 mg total) by mouth daily, Disp: 90 tablet, Rfl: 1    Multiple Vitamin (MULTIVITAMIN) capsule, Take 1 capsule by mouth daily  , Disp: , Rfl:     ofloxacin (OCUFLOX) 0 3 % ophthalmic solution, , Disp: , Rfl:     prednisoLONE acetate (PRED FORTE) 1 % ophthalmic suspension, , Disp: , Rfl:     Review of Systems   Constitutional: Positive for diaphoresis and fatigue  Negative for activity change, appetite change, fever, unexpected weight change, weight gain and weight loss  Respiratory: Negative for cough, chest tightness, shortness of breath and wheezing  Cardiovascular: Negative for chest pain, palpitations and leg swelling  Gastrointestinal: Negative for abdominal pain, constipation, diarrhea, nausea and vomiting  Genitourinary: Negative for difficulty urinating  Incontinence   Neurological: Negative for dizziness, numbness and headaches  Psychiatric/Behavioral: Positive for sleep disturbance (improving)  Objective:    /80 (BP Location: Left arm, Patient Position: Sitting)   Pulse 96   Resp 16   Ht 5' 3" (1 6 m)   Wt 79 4 kg (175 lb)   SpO2 97%   BMI 31 00 kg/m²      Physical Exam  Vitals signs reviewed     Constitutional:       General: She is not in acute distress  Appearance: Normal appearance  She is obese  She is not ill-appearing  HENT:      Head: Normocephalic  Mouth/Throat:      Mouth: Mucous membranes are dry  Pharynx: Oropharynx is clear  No oropharyngeal exudate or posterior oropharyngeal erythema  Neck:      Musculoskeletal: Neck supple  No muscular tenderness  Thyroid: No thyromegaly or thyroid tenderness  Cardiovascular:      Rate and Rhythm: Normal rate and regular rhythm  Pulses: Normal pulses  Heart sounds: Normal heart sounds  Pulmonary:      Effort: Pulmonary effort is normal  No respiratory distress  Breath sounds: Normal breath sounds  No wheezing  Musculoskeletal:      Right lower leg: No edema  Left lower leg: No edema  Lymphadenopathy:      Cervical: No cervical adenopathy  Skin:     General: Skin is dry  Coloration: Skin is not jaundiced or pale  Neurological:      Mental Status: She is alert and oriented to person, place, and time     Psychiatric:         Attention and Perception: Attention normal          Mood and Affect: Mood and affect normal          Speech: Speech normal          Recent Results (from the past 336 hour(s))   TSH, 3rd generation with Free T4 reflex    Collection Time: 03/13/21  9:21 AM   Result Value Ref Range    TSH 3RD GENERATON 1 060 0 358 - 3 740 uIU/mL   Comprehensive metabolic panel    Collection Time: 03/13/21  9:21 AM   Result Value Ref Range    Sodium 138 136 - 145 mmol/L    Potassium 4 4 3 5 - 5 3 mmol/L    Chloride 107 100 - 108 mmol/L    CO2 28 21 - 32 mmol/L    ANION GAP 3 (L) 4 - 13 mmol/L    BUN 15 5 - 25 mg/dL    Creatinine 0 82 0 60 - 1 30 mg/dL    Glucose, Fasting 96 65 - 99 mg/dL    Calcium 10 0 8 3 - 10 1 mg/dL    AST 13 5 - 45 U/L    ALT 22 12 - 78 U/L    Alkaline Phosphatase 97 46 - 116 U/L    Total Protein 7 5 6 4 - 8 2 g/dL    Albumin 3 9 3 5 - 5 0 g/dL    Total Bilirubin 0 61 0 20 - 1 00 mg/dL    eGFR 71 ml/min/1 73sq m   CBC and differential    Collection Time: 03/13/21  9:21 AM   Result Value Ref Range    WBC 7 94 4 31 - 10 16 Thousand/uL    RBC 4 57 3 81 - 5 12 Million/uL    Hemoglobin 15 1 11 5 - 15 4 g/dL    Hematocrit 46 0 34 8 - 46 1 %     (H) 82 - 98 fL    MCH 33 0 26 8 - 34 3 pg    MCHC 32 8 31 4 - 37 4 g/dL    RDW 13 9 11 6 - 15 1 %    MPV 9 3 8 9 - 12 7 fL    Platelets 108 (H) 347 - 390 Thousands/uL    nRBC 0 /100 WBCs    Neutrophils Relative 66 43 - 75 %    Immat GRANS % 1 0 - 2 %    Lymphocytes Relative 22 14 - 44 %    Monocytes Relative 8 4 - 12 %    Eosinophils Relative 2 0 - 6 %    Basophils Relative 1 0 - 1 %    Neutrophils Absolute 5 28 1 85 - 7 62 Thousands/µL    Immature Grans Absolute 0 04 0 00 - 0 20 Thousand/uL    Lymphocytes Absolute 1 76 0 60 - 4 47 Thousands/µL    Monocytes Absolute 0 61 0 17 - 1 22 Thousand/µL    Eosinophils Absolute 0 17 0 00 - 0 61 Thousand/µL    Basophils Absolute 0 08 0 00 - 0 10 Thousands/µL     BMI Counseling: Body mass index is 31 kg/m²  The BMI is above normal  Nutrition recommendations include decreasing portion sizes, consuming healthier snacks, limiting drinks that contain sugar, moderation in carbohydrate intake and reducing intake of cholesterol  Exercise recommendations include exercising 3-5 times per week and strength training exercises  No pharmacotherapy was ordered

## 2021-03-16 NOTE — ASSESSMENT & PLAN NOTE
-normotensive  -continue present regimen of amlodipine 10mg daily, hctz 12 5mg daily and lisinopril 20mg daily  -monitor BMP

## 2021-03-22 ENCOUNTER — TELEPHONE (OUTPATIENT)
Dept: HEMATOLOGY ONCOLOGY | Facility: HOSPITAL | Age: 76
End: 2021-03-22

## 2021-03-23 ENCOUNTER — OFFICE VISIT (OUTPATIENT)
Dept: HEMATOLOGY ONCOLOGY | Facility: CLINIC | Age: 76
End: 2021-03-23
Payer: COMMERCIAL

## 2021-03-23 VITALS
TEMPERATURE: 99.2 F | OXYGEN SATURATION: 98 % | DIASTOLIC BLOOD PRESSURE: 82 MMHG | SYSTOLIC BLOOD PRESSURE: 124 MMHG | HEART RATE: 88 BPM | HEIGHT: 63 IN | RESPIRATION RATE: 16 BRPM | WEIGHT: 177.4 LBS | BODY MASS INDEX: 31.43 KG/M2

## 2021-03-23 DIAGNOSIS — D47.3 ESSENTIAL THROMBOCYTHEMIA (HCC): Primary | ICD-10-CM

## 2021-03-23 PROCEDURE — 1160F RVW MEDS BY RX/DR IN RCRD: CPT | Performed by: PHYSICIAN ASSISTANT

## 2021-03-23 PROCEDURE — 1036F TOBACCO NON-USER: CPT | Performed by: PHYSICIAN ASSISTANT

## 2021-03-23 PROCEDURE — 99214 OFFICE O/P EST MOD 30 MIN: CPT | Performed by: PHYSICIAN ASSISTANT

## 2021-03-23 PROCEDURE — 3008F BODY MASS INDEX DOCD: CPT | Performed by: PHYSICIAN ASSISTANT

## 2021-03-23 PROCEDURE — 3079F DIAST BP 80-89 MM HG: CPT | Performed by: PHYSICIAN ASSISTANT

## 2021-03-23 NOTE — PROGRESS NOTES
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  Taking hydroxyurea 500 milligrams every other day since 02/2020  Platelet count 145 4/2685     continue aspirin 81 milligram p o  daily for easy bruisability     follow-up with labs in 6 months: CBC, CMP      HPI: 70year-old  female who was seen initially 6/2017 regarding persistent thrombocytosis going back to at least March 2015  May 2017 WBC 9 3, hemoglobin 15 1, MCV 91, platelets 678,000, 96% neutrophils, 19% lymphocytes, 9% monocytes, 2% eosinophils  November 2016 WBC 9 6, hemoglobin 15 1, platelets 489,548   June 2016 platelets 273,690   March 2016 platelets 562,446   March 2015 platelets 650,306    She has a history of high blood pressure, hypothyroidism,  cholecystectomy in March 2016   She is a smoker - used to smoke half pack of cigarettes every day for 40 years quit in 2014   She does not drink alcohol   History negative for blood disorders of cancer   FADIA, rheumatoid factor normal   Ultrasound of the abdomen did not identify hepatosplenomegaly   DEAN 2 mutation present  Hydrea 500mg po every other day initiated 2/2020       3/15/20:  Hemoglobin decreased from 16 2 to 15 1  Platelet count decreased from 826 to 702       7/2020: Easy bruisability of upper arms, aspirin reduced to 81mg p o  daily from 160 mg p o  daily  Interval History:  Feeling well, bruising limited to dorsal right hand provoked from bumping her hand         Test Results:        Labs:   Lab Results   Component Value Date    HGB 15 1 03/13/2021    HCT 46 0 03/13/2021     (H) 03/13/2021     (H) 03/13/2021    WBC 7 94 03/13/2021    NRBC 0 03/13/2021     Lab Results   Component Value Date     (L) 12/29/2015    K 4 4 03/13/2021     03/13/2021 CO2 28 03/13/2021    ANIONGAP 13 12/29/2015    BUN 15 03/13/2021    CREATININE 0 82 03/13/2021    GLUCOSE 172 (H) 12/29/2015    GLUF 96 03/13/2021    CALCIUM 10 0 03/13/2021    CORRECTEDCA 10 5 (H) 07/18/2020    AST 13 03/13/2021    ALT 22 03/13/2021    ALKPHOS 97 03/13/2021    PROT 7 9 09/05/2015    BILITOT 0 47 09/05/2015    EGFR 71 03/13/2021       Imaging: No results found  ROS:  As mentioned in HPI & Interval History otherwise 14 point ROS negative  Allergies: No Known Allergies  Current Medications: Reviewed  PMH/FH/SH:  Reviewed      Physical Exam:    1 84 meters squared    Ht Readings from Last 3 Encounters:   03/23/21 5' 3" (1 6 m)   03/16/21 5' 3" (1 6 m)   01/05/21 5' 3" (1 6 m)        Wt Readings from Last 3 Encounters:   03/23/21 80 5 kg (177 lb 6 4 oz)   03/16/21 79 4 kg (175 lb)   01/05/21 78 2 kg (172 lb 6 4 oz)        Temp Readings from Last 3 Encounters:   03/23/21 99 2 °F (37 3 °C) (Tympanic)   01/05/21 98 9 °F (37 2 °C) (Tympanic)   07/28/20 98 °F (36 7 °C) (Tympanic)        BP Readings from Last 3 Encounters:   03/23/21 124/82   03/16/21 122/80   01/05/21 126/80           Physical Exam  Vitals signs reviewed  Constitutional:       General: She is not in acute distress  Appearance: She is well-developed  She is not diaphoretic  HENT:      Head: Normocephalic and atraumatic  Eyes:      Conjunctiva/sclera: Conjunctivae normal    Neck:      Musculoskeletal: Normal range of motion and neck supple  Trachea: No tracheal deviation  Cardiovascular:      Rate and Rhythm: Normal rate and regular rhythm  Heart sounds: No murmur  No friction rub  No gallop  Pulmonary:      Effort: Pulmonary effort is normal  No respiratory distress  Breath sounds: Normal breath sounds  No wheezing or rales  Chest:      Chest wall: No tenderness  Abdominal:      General: There is no distension  Palpations: Abdomen is soft  Tenderness: There is no abdominal tenderness  Lymphadenopathy:      Cervical: No cervical adenopathy  Skin:     General: Skin is warm and dry  Coloration: Skin is not pale  Findings: Bruising (dorsal right hand) present  No erythema  Neurological:      Mental Status: She is alert and oriented to person, place, and time  Psychiatric:         Behavior: Behavior normal          Thought Content:  Thought content normal          Judgment: Judgment normal            Emergency Contacts:    Mallory, [phone],

## 2021-03-30 ENCOUNTER — TELEPHONE (OUTPATIENT)
Dept: HEMATOLOGY ONCOLOGY | Facility: CLINIC | Age: 76
End: 2021-03-30

## 2021-03-30 DIAGNOSIS — E03.9 ACQUIRED HYPOTHYROIDISM: ICD-10-CM

## 2021-03-30 DIAGNOSIS — D47.3 ESSENTIAL THROMBOCYTHEMIA (HCC): ICD-10-CM

## 2021-03-30 RX ORDER — LEVOTHYROXINE SODIUM 112 UG/1
112 TABLET ORAL DAILY
Qty: 90 TABLET | Refills: 1 | Status: SHIPPED | OUTPATIENT
Start: 2021-03-30 | End: 2021-09-28 | Stop reason: SDUPTHER

## 2021-03-30 RX ORDER — HYDROXYUREA 500 MG/1
CAPSULE ORAL
Qty: 30 CAPSULE | Refills: 2 | Status: SHIPPED | OUTPATIENT
Start: 2021-03-30 | End: 2021-09-27

## 2021-03-30 NOTE — TELEPHONE ENCOUNTER
Medication Refill     Who is Calling  Patient    Medication  hydroxyurea (HYDREA) 500 mg       How many pills left  3   Preferred Pharmacy / Address  [address]    Call back number  [phone]   Relevant Information English

## 2021-03-31 ENCOUNTER — TELEPHONE (OUTPATIENT)
Dept: HEMATOLOGY ONCOLOGY | Facility: CLINIC | Age: 76
End: 2021-03-31

## 2021-03-31 NOTE — TELEPHONE ENCOUNTER
Patient calling to confirm Hydrea was sent to pharmacy yesterday  Confirmed it was - received receipt confirmation   Patient verbalized understanding

## 2021-04-14 DIAGNOSIS — E87.1 HYPONATREMIA: ICD-10-CM

## 2021-04-14 RX ORDER — HYDROCHLOROTHIAZIDE 12.5 MG/1
12.5 CAPSULE, GELATIN COATED ORAL EVERY MORNING
Qty: 90 CAPSULE | Refills: 2 | Status: SHIPPED | OUTPATIENT
Start: 2021-04-14 | End: 2022-01-31 | Stop reason: HOSPADM

## 2021-06-04 DIAGNOSIS — E87.6 HYPOKALEMIA: ICD-10-CM

## 2021-06-04 RX ORDER — POTASSIUM CHLORIDE 1500 MG/1
TABLET, EXTENDED RELEASE ORAL
Qty: 45 TABLET | Refills: 1 | Status: SHIPPED | OUTPATIENT
Start: 2021-06-04 | End: 2021-12-23 | Stop reason: SDUPTHER

## 2021-06-04 RX ORDER — POTASSIUM CHLORIDE 1500 MG/1
20 TABLET, EXTENDED RELEASE ORAL DAILY
Qty: 45 TABLET | Refills: 1 | Status: SHIPPED | OUTPATIENT
Start: 2021-06-04 | End: 2021-06-04 | Stop reason: SDUPTHER

## 2021-06-18 ENCOUNTER — TELEPHONE (OUTPATIENT)
Dept: HEMATOLOGY ONCOLOGY | Facility: CLINIC | Age: 76
End: 2021-06-18

## 2021-06-18 NOTE — TELEPHONE ENCOUNTER
I called patient - No answer  I left vm to advise her that Dr Veronica Mallory will no longer be in the [place] location beginning Fri, 07/02/2021  She had an appointment scheduled for Friday, 09/24/2021, that will need to be re-scheduled  I advised her Dr Veronica Mallory will be at [address] [facility] Wed-Th  I told her I have cancelled her appointment and requested she call back to re-schedule   - Manjula Corbett

## 2021-08-19 DIAGNOSIS — I10 BENIGN ESSENTIAL HYPERTENSION: ICD-10-CM

## 2021-08-19 RX ORDER — LISINOPRIL 20 MG/1
20 TABLET ORAL DAILY
Qty: 90 TABLET | Refills: 1 | Status: SHIPPED | OUTPATIENT
Start: 2021-08-19 | End: 2022-02-22 | Stop reason: SDUPTHER

## 2021-08-31 DIAGNOSIS — I10 BENIGN ESSENTIAL HYPERTENSION: ICD-10-CM

## 2021-08-31 DIAGNOSIS — I10 BENIGN HYPERTENSION: ICD-10-CM

## 2021-08-31 RX ORDER — AMLODIPINE BESYLATE 5 MG/1
10 TABLET ORAL DAILY
Qty: 180 TABLET | Refills: 1 | Status: SHIPPED | OUTPATIENT
Start: 2021-08-31 | End: 2022-02-22 | Stop reason: SDUPTHER

## 2021-09-20 ENCOUNTER — TELEPHONE (OUTPATIENT)
Dept: HEMATOLOGY ONCOLOGY | Facility: CLINIC | Age: 76
End: 2021-09-20

## 2021-09-20 ENCOUNTER — RA CDI HCC (OUTPATIENT)
Dept: OTHER | Facility: HOSPITAL | Age: 76
End: 2021-09-20

## 2021-09-20 NOTE — PROGRESS NOTES
Paramedics also report the doctors at [facility] were concerned about possible PE but patient stated during triage that he refuses to have a CT scan       Doris Power RN  04/27/18 [id number] Re: Shila Trenton    Based on clinical documentation indicated in your record, it appears that the patient may have the following conditions not coded in :    D47 1 Myeloproliferative disorder      If this is correct, please document and assess at your next visit       [facility] 75  coding opportunities          Number of diagnosis code(s) already on the problem list added to  fla                     Patients insurance company: [employer] (Medicare Advantage and Commercial)             [facility] 75  coding opportunities          Number of diagnosis code(s) already on the problem list added to [employer] fla                     Patients insurance company: [employer] (Medicare Advantage and Commercial)     Visit status: Patient canceled the appointment

## 2021-09-25 DIAGNOSIS — D47.3 ESSENTIAL THROMBOCYTHEMIA (HCC): ICD-10-CM

## 2021-09-27 RX ORDER — HYDROXYUREA 500 MG/1
CAPSULE ORAL
Qty: 90 CAPSULE | Refills: 3 | Status: SHIPPED | OUTPATIENT
Start: 2021-09-27 | End: 2021-09-29

## 2021-09-28 DIAGNOSIS — E03.9 ACQUIRED HYPOTHYROIDISM: ICD-10-CM

## 2021-09-28 RX ORDER — LEVOTHYROXINE SODIUM 112 UG/1
112 TABLET ORAL DAILY
Qty: 90 TABLET | Refills: 1 | Status: SHIPPED | OUTPATIENT
Start: 2021-09-28 | End: 2022-03-24 | Stop reason: SDUPTHER

## 2021-09-29 DIAGNOSIS — D47.3 ESSENTIAL THROMBOCYTHEMIA (HCC): ICD-10-CM

## 2021-09-29 RX ORDER — HYDROXYUREA 500 MG/1
CAPSULE ORAL
Qty: 30 CAPSULE | Refills: 11 | Status: SHIPPED | OUTPATIENT
Start: 2021-09-29

## 2021-10-12 ENCOUNTER — TELEPHONE (OUTPATIENT)
Dept: HEMATOLOGY ONCOLOGY | Facility: CLINIC | Age: 76
End: 2021-10-12

## 2021-10-17 ENCOUNTER — APPOINTMENT (OUTPATIENT)
Dept: LAB | Age: 76
End: 2021-10-17
Payer: COMMERCIAL

## 2021-10-17 DIAGNOSIS — I10 BENIGN ESSENTIAL HYPERTENSION: ICD-10-CM

## 2021-10-17 DIAGNOSIS — D47.3 ESSENTIAL THROMBOCYTHEMIA (HCC): ICD-10-CM

## 2021-10-17 DIAGNOSIS — E03.9 ACQUIRED HYPOTHYROIDISM: ICD-10-CM

## 2021-10-17 LAB
ALBUMIN SERPL BCP-MCNC: 3.7 G/DL (ref 3.5–5)
ALP SERPL-CCNC: 84 U/L (ref 46–116)
ALT SERPL W P-5'-P-CCNC: 20 U/L (ref 12–78)
ANION GAP SERPL CALCULATED.3IONS-SCNC: 1 MMOL/L (ref 4–13)
AST SERPL W P-5'-P-CCNC: 15 U/L (ref 5–45)
BASOPHILS # BLD AUTO: 0.05 THOUSANDS/ΜL (ref 0–0.1)
BASOPHILS NFR BLD AUTO: 1 % (ref 0–1)
BILIRUB SERPL-MCNC: 0.55 MG/DL (ref 0.2–1)
BUN SERPL-MCNC: 13 MG/DL (ref 5–25)
CALCIUM SERPL-MCNC: 10.1 MG/DL (ref 8.3–10.1)
CHLORIDE SERPL-SCNC: 103 MMOL/L (ref 100–108)
CHOLEST SERPL-MCNC: 186 MG/DL (ref 50–200)
CO2 SERPL-SCNC: 30 MMOL/L (ref 21–32)
CREAT SERPL-MCNC: 0.84 MG/DL (ref 0.6–1.3)
EOSINOPHIL # BLD AUTO: 0.12 THOUSAND/ΜL (ref 0–0.61)
EOSINOPHIL NFR BLD AUTO: 1 % (ref 0–6)
ERYTHROCYTE [DISTWIDTH] IN BLOOD BY AUTOMATED COUNT: 13.5 % (ref 11.6–15.1)
GFR SERPL CREATININE-BSD FRML MDRD: 69 ML/MIN/1.73SQ M
GLUCOSE P FAST SERPL-MCNC: 104 MG/DL (ref 65–99)
HCT VFR BLD AUTO: 46.1 % (ref 34.8–46.1)
HDLC SERPL-MCNC: 42 MG/DL
HGB BLD-MCNC: 15.3 G/DL (ref 11.5–15.4)
IMM GRANULOCYTES # BLD AUTO: 0.04 THOUSAND/UL (ref 0–0.2)
IMM GRANULOCYTES NFR BLD AUTO: 0 % (ref 0–2)
LDLC SERPL CALC-MCNC: 118 MG/DL (ref 0–100)
LYMPHOCYTES # BLD AUTO: 1.42 THOUSANDS/ΜL (ref 0.6–4.47)
LYMPHOCYTES NFR BLD AUTO: 15 % (ref 14–44)
MCH RBC QN AUTO: 33.6 PG (ref 26.8–34.3)
MCHC RBC AUTO-ENTMCNC: 33.2 G/DL (ref 31.4–37.4)
MCV RBC AUTO: 101 FL (ref 82–98)
MONOCYTES # BLD AUTO: 0.6 THOUSAND/ΜL (ref 0.17–1.22)
MONOCYTES NFR BLD AUTO: 6 % (ref 4–12)
NEUTROPHILS # BLD AUTO: 7.45 THOUSANDS/ΜL (ref 1.85–7.62)
NEUTS SEG NFR BLD AUTO: 77 % (ref 43–75)
NONHDLC SERPL-MCNC: 144 MG/DL
NRBC BLD AUTO-RTO: 0 /100 WBCS
PLATELET # BLD AUTO: 576 THOUSANDS/UL (ref 149–390)
PMV BLD AUTO: 9.4 FL (ref 8.9–12.7)
POTASSIUM SERPL-SCNC: 4.6 MMOL/L (ref 3.5–5.3)
PROT SERPL-MCNC: 7.7 G/DL (ref 6.4–8.2)
RBC # BLD AUTO: 4.56 MILLION/UL (ref 3.81–5.12)
SODIUM SERPL-SCNC: 134 MMOL/L (ref 136–145)
TRIGL SERPL-MCNC: 128 MG/DL
TSH SERPL DL<=0.05 MIU/L-ACNC: 1.09 UIU/ML (ref 0.36–3.74)
WBC # BLD AUTO: 9.68 THOUSAND/UL (ref 4.31–10.16)

## 2021-10-17 PROCEDURE — 84443 ASSAY THYROID STIM HORMONE: CPT

## 2021-10-17 PROCEDURE — 85025 COMPLETE CBC W/AUTO DIFF WBC: CPT

## 2021-10-17 PROCEDURE — 80053 COMPREHEN METABOLIC PANEL: CPT

## 2021-10-17 PROCEDURE — 80061 LIPID PANEL: CPT

## 2021-10-17 PROCEDURE — 36415 COLL VENOUS BLD VENIPUNCTURE: CPT

## 2021-10-19 ENCOUNTER — OFFICE VISIT (OUTPATIENT)
Dept: HEMATOLOGY ONCOLOGY | Facility: CLINIC | Age: 76
End: 2021-10-19
Payer: COMMERCIAL

## 2021-10-19 VITALS
RESPIRATION RATE: 16 BRPM | DIASTOLIC BLOOD PRESSURE: 82 MMHG | TEMPERATURE: 97.8 F | SYSTOLIC BLOOD PRESSURE: 136 MMHG | OXYGEN SATURATION: 97 % | WEIGHT: 177 LBS | HEART RATE: 92 BPM | BODY MASS INDEX: 31.36 KG/M2 | HEIGHT: 63 IN

## 2021-10-19 DIAGNOSIS — D47.3 ESSENTIAL THROMBOCYTHEMIA (HCC): Primary | ICD-10-CM

## 2021-10-19 PROCEDURE — 99213 OFFICE O/P EST LOW 20 MIN: CPT | Performed by: INTERNAL MEDICINE

## 2021-10-19 PROCEDURE — 3008F BODY MASS INDEX DOCD: CPT | Performed by: INTERNAL MEDICINE

## 2021-10-19 PROCEDURE — 1036F TOBACCO NON-USER: CPT | Performed by: INTERNAL MEDICINE

## 2021-10-19 PROCEDURE — 1160F RVW MEDS BY RX/DR IN RCRD: CPT | Performed by: INTERNAL MEDICINE

## 2021-10-27 ENCOUNTER — RA CDI HCC (OUTPATIENT)
Dept: OTHER | Facility: HOSPITAL | Age: 76
End: 2021-10-27

## 2021-11-03 ENCOUNTER — OFFICE VISIT (OUTPATIENT)
Dept: INTERNAL MEDICINE CLINIC | Facility: CLINIC | Age: 76
End: 2021-11-03
Payer: COMMERCIAL

## 2021-11-03 VITALS
WEIGHT: 173.4 LBS | HEIGHT: 63 IN | HEART RATE: 106 BPM | SYSTOLIC BLOOD PRESSURE: 112 MMHG | OXYGEN SATURATION: 98 % | TEMPERATURE: 98 F | DIASTOLIC BLOOD PRESSURE: 80 MMHG | BODY MASS INDEX: 30.72 KG/M2 | RESPIRATION RATE: 16 BRPM

## 2021-11-03 DIAGNOSIS — G47.00 INSOMNIA, UNSPECIFIED TYPE: ICD-10-CM

## 2021-11-03 DIAGNOSIS — Z00.00 MEDICARE ANNUAL WELLNESS VISIT, SUBSEQUENT: ICD-10-CM

## 2021-11-03 DIAGNOSIS — Z23 ENCOUNTER FOR IMMUNIZATION: ICD-10-CM

## 2021-11-03 DIAGNOSIS — E03.9 ACQUIRED HYPOTHYROIDISM: ICD-10-CM

## 2021-11-03 DIAGNOSIS — D47.3 ESSENTIAL THROMBOCYTHEMIA (HCC): ICD-10-CM

## 2021-11-03 DIAGNOSIS — R73.01 IMPAIRED FASTING GLUCOSE: ICD-10-CM

## 2021-11-03 DIAGNOSIS — E87.1 HYPONATREMIA: ICD-10-CM

## 2021-11-03 DIAGNOSIS — Z12.31 ENCOUNTER FOR SCREENING MAMMOGRAM FOR BREAST CANCER: ICD-10-CM

## 2021-11-03 DIAGNOSIS — I10 BENIGN ESSENTIAL HYPERTENSION: Primary | ICD-10-CM

## 2021-11-03 PROBLEM — E83.52 HYPERCALCEMIA: Status: RESOLVED | Noted: 2019-12-30 | Resolved: 2021-11-03

## 2021-11-03 PROCEDURE — 3008F BODY MASS INDEX DOCD: CPT | Performed by: INTERNAL MEDICINE

## 2021-11-03 PROCEDURE — 1125F AMNT PAIN NOTED PAIN PRSNT: CPT | Performed by: INTERNAL MEDICINE

## 2021-11-03 PROCEDURE — 1170F FXNL STATUS ASSESSED: CPT | Performed by: INTERNAL MEDICINE

## 2021-11-03 PROCEDURE — G0008 ADMIN INFLUENZA VIRUS VAC: HCPCS | Performed by: INTERNAL MEDICINE

## 2021-11-03 PROCEDURE — 1036F TOBACCO NON-USER: CPT | Performed by: INTERNAL MEDICINE

## 2021-11-03 PROCEDURE — G0439 PPPS, SUBSEQ VISIT: HCPCS | Performed by: INTERNAL MEDICINE

## 2021-11-03 PROCEDURE — 1160F RVW MEDS BY RX/DR IN RCRD: CPT | Performed by: INTERNAL MEDICINE

## 2021-11-03 PROCEDURE — 3725F SCREEN DEPRESSION PERFORMED: CPT | Performed by: INTERNAL MEDICINE

## 2021-11-03 PROCEDURE — 3079F DIAST BP 80-89 MM HG: CPT | Performed by: INTERNAL MEDICINE

## 2021-11-03 PROCEDURE — 90662 IIV NO PRSV INCREASED AG IM: CPT | Performed by: INTERNAL MEDICINE

## 2021-11-03 PROCEDURE — 3074F SYST BP LT 130 MM HG: CPT | Performed by: INTERNAL MEDICINE

## 2021-11-03 PROCEDURE — 3288F FALL RISK ASSESSMENT DOCD: CPT | Performed by: INTERNAL MEDICINE

## 2021-11-03 PROCEDURE — 99214 OFFICE O/P EST MOD 30 MIN: CPT | Performed by: INTERNAL MEDICINE

## 2021-11-14 ENCOUNTER — APPOINTMENT (OUTPATIENT)
Dept: LAB | Age: 76
End: 2021-11-14
Payer: COMMERCIAL

## 2021-11-14 DIAGNOSIS — E87.1 HYPONATREMIA: ICD-10-CM

## 2021-11-14 LAB
ANION GAP SERPL CALCULATED.3IONS-SCNC: 4 MMOL/L (ref 4–13)
BUN SERPL-MCNC: 15 MG/DL (ref 5–25)
CALCIUM SERPL-MCNC: 9.8 MG/DL (ref 8.3–10.1)
CHLORIDE SERPL-SCNC: 104 MMOL/L (ref 100–108)
CO2 SERPL-SCNC: 28 MMOL/L (ref 21–32)
CREAT SERPL-MCNC: 0.77 MG/DL (ref 0.6–1.3)
GFR SERPL CREATININE-BSD FRML MDRD: 76 ML/MIN/1.73SQ M
GLUCOSE P FAST SERPL-MCNC: 93 MG/DL (ref 65–99)
POTASSIUM SERPL-SCNC: 3.8 MMOL/L (ref 3.5–5.3)
SODIUM SERPL-SCNC: 136 MMOL/L (ref 136–145)

## 2021-11-14 PROCEDURE — 36415 COLL VENOUS BLD VENIPUNCTURE: CPT

## 2021-11-14 PROCEDURE — 80048 BASIC METABOLIC PNL TOTAL CA: CPT

## 2021-11-19 ENCOUNTER — TELEPHONE (OUTPATIENT)
Dept: INTERNAL MEDICINE CLINIC | Facility: CLINIC | Age: 76
End: 2021-11-19

## 2021-12-23 DIAGNOSIS — E87.6 HYPOKALEMIA: ICD-10-CM

## 2021-12-23 RX ORDER — POTASSIUM CHLORIDE 1500 MG/1
TABLET, EXTENDED RELEASE ORAL
Qty: 45 TABLET | Refills: 1 | Status: SHIPPED | OUTPATIENT
Start: 2021-12-23 | End: 2022-01-31 | Stop reason: HOSPADM

## 2022-01-28 ENCOUNTER — APPOINTMENT (INPATIENT)
Dept: NON INVASIVE DIAGNOSTICS | Facility: HOSPITAL | Age: 77
DRG: 247 | End: 2022-01-28
Payer: COMMERCIAL

## 2022-01-28 ENCOUNTER — HOSPITAL ENCOUNTER (INPATIENT)
Facility: HOSPITAL | Age: 77
LOS: 3 days | Discharge: HOME | DRG: 247 | End: 2022-01-31
Attending: EMERGENCY MEDICINE | Admitting: INTERNAL MEDICINE
Payer: COMMERCIAL

## 2022-01-28 DIAGNOSIS — I21.11 STEMI INVOLVING RIGHT CORONARY ARTERY (HCC): Primary | ICD-10-CM

## 2022-01-28 DIAGNOSIS — I21.3 STEMI (ST ELEVATION MYOCARDIAL INFARCTION) (HCC): ICD-10-CM

## 2022-01-28 DIAGNOSIS — D64.9 ANEMIA, UNSPECIFIED TYPE: ICD-10-CM

## 2022-01-28 LAB
2HR DELTA HS TROPONIN: 1222 NG/L
4HR DELTA HS TROPONIN: 3233 NG/L
ALBUMIN SERPL BCP-MCNC: 3.8 G/DL (ref 3.5–5)
ALP SERPL-CCNC: 98 U/L (ref 46–116)
ALT SERPL W P-5'-P-CCNC: 27 U/L (ref 12–78)
ANION GAP SERPL CALCULATED.3IONS-SCNC: 10 MMOL/L (ref 4–13)
APTT PPP: 18 SECONDS (ref 23–37)
AST SERPL W P-5'-P-CCNC: 20 U/L (ref 5–45)
ATRIAL RATE: 70 BPM
BILIRUB SERPL-MCNC: 0.33 MG/DL (ref 0.2–1)
BUN SERPL-MCNC: 19 MG/DL (ref 5–25)
CALCIUM SERPL-MCNC: 11 MG/DL (ref 8.3–10.1)
CARDIAC TROPONIN I PNL SERPL HS: 1308 NG/L
CARDIAC TROPONIN I PNL SERPL HS: 3319 NG/L
CARDIAC TROPONIN I PNL SERPL HS: 86 NG/L
CHLORIDE SERPL-SCNC: 104 MMOL/L (ref 100–108)
CO2 SERPL-SCNC: 23 MMOL/L (ref 21–32)
CREAT SERPL-MCNC: 1.07 MG/DL (ref 0.6–1.3)
GFR SERPL CREATININE-BSD FRML MDRD: 51 ML/MIN/1.73SQ M
GLUCOSE SERPL-MCNC: 174 MG/DL (ref 65–140)
INR PPP: 0.94 (ref 0.84–1.19)
P AXIS: 55 DEGREES
PLATELET # BLD AUTO: 425 THOUSANDS/UL (ref 149–390)
PMV BLD AUTO: 9.1 FL (ref 8.9–12.7)
POTASSIUM SERPL-SCNC: 3.6 MMOL/L (ref 3.5–5.3)
PR INTERVAL: 218 MS
PROT SERPL-MCNC: 8.1 G/DL (ref 6.4–8.2)
PROTHROMBIN TIME: 12.2 SECONDS (ref 11.6–14.5)
QRS AXIS: 31 DEGREES
QRSD INTERVAL: 70 MS
QT INTERVAL: 386 MS
QTC INTERVAL: 416 MS
SODIUM SERPL-SCNC: 137 MMOL/L (ref 136–145)
T WAVE AXIS: 96 DEGREES
VENTRICULAR RATE: 70 BPM

## 2022-01-28 PROCEDURE — 93454 CORONARY ARTERY ANGIO S&I: CPT | Performed by: INTERNAL MEDICINE

## 2022-01-28 PROCEDURE — C1769 GUIDE WIRE: HCPCS | Performed by: INTERNAL MEDICINE

## 2022-01-28 PROCEDURE — 36415 COLL VENOUS BLD VENIPUNCTURE: CPT | Performed by: STUDENT IN AN ORGANIZED HEALTH CARE EDUCATION/TRAINING PROGRAM

## 2022-01-28 PROCEDURE — 99291 CRITICAL CARE FIRST HOUR: CPT | Performed by: EMERGENCY MEDICINE

## 2022-01-28 PROCEDURE — C1725 CATH, TRANSLUMIN NON-LASER: HCPCS | Performed by: INTERNAL MEDICINE

## 2022-01-28 PROCEDURE — 99285 EMERGENCY DEPT VISIT HI MDM: CPT

## 2022-01-28 PROCEDURE — 92941 PRQ TRLML REVSC TOT OCCL AMI: CPT | Performed by: INTERNAL MEDICINE

## 2022-01-28 PROCEDURE — 85730 THROMBOPLASTIN TIME PARTIAL: CPT | Performed by: STUDENT IN AN ORGANIZED HEALTH CARE EDUCATION/TRAINING PROGRAM

## 2022-01-28 PROCEDURE — 92973 PRQ TRLUML C MCHN ASP THRMBC: CPT | Performed by: INTERNAL MEDICINE

## 2022-01-28 PROCEDURE — B2111ZZ FLUOROSCOPY OF MULTIPLE CORONARY ARTERIES USING LOW OSMOLAR CONTRAST: ICD-10-PCS | Performed by: INTERNAL MEDICINE

## 2022-01-28 PROCEDURE — 99153 MOD SED SAME PHYS/QHP EA: CPT | Performed by: INTERNAL MEDICINE

## 2022-01-28 PROCEDURE — C1760 CLOSURE DEV, VASC: HCPCS | Performed by: INTERNAL MEDICINE

## 2022-01-28 PROCEDURE — 85347 COAGULATION TIME ACTIVATED: CPT

## 2022-01-28 PROCEDURE — 96375 TX/PRO/DX INJ NEW DRUG ADDON: CPT

## 2022-01-28 PROCEDURE — NC001 PR NO CHARGE: Performed by: INTERNAL MEDICINE

## 2022-01-28 PROCEDURE — 93306 TTE W/DOPPLER COMPLETE: CPT

## 2022-01-28 PROCEDURE — 85610 PROTHROMBIN TIME: CPT | Performed by: STUDENT IN AN ORGANIZED HEALTH CARE EDUCATION/TRAINING PROGRAM

## 2022-01-28 PROCEDURE — C1894 INTRO/SHEATH, NON-LASER: HCPCS | Performed by: INTERNAL MEDICINE

## 2022-01-28 PROCEDURE — 84484 ASSAY OF TROPONIN QUANT: CPT | Performed by: STUDENT IN AN ORGANIZED HEALTH CARE EDUCATION/TRAINING PROGRAM

## 2022-01-28 PROCEDURE — C9600 PERC DRUG-EL COR STENT SING: HCPCS | Performed by: INTERNAL MEDICINE

## 2022-01-28 PROCEDURE — 99152 MOD SED SAME PHYS/QHP 5/>YRS: CPT | Performed by: INTERNAL MEDICINE

## 2022-01-28 PROCEDURE — 027034Z DILATION OF CORONARY ARTERY, ONE ARTERY WITH DRUG-ELUTING INTRALUMINAL DEVICE, PERCUTANEOUS APPROACH: ICD-10-PCS | Performed by: INTERNAL MEDICINE

## 2022-01-28 PROCEDURE — 93010 ELECTROCARDIOGRAM REPORT: CPT | Performed by: INTERNAL MEDICINE

## 2022-01-28 PROCEDURE — 93306 TTE W/DOPPLER COMPLETE: CPT | Performed by: INTERNAL MEDICINE

## 2022-01-28 PROCEDURE — 80053 COMPREHEN METABOLIC PANEL: CPT | Performed by: STUDENT IN AN ORGANIZED HEALTH CARE EDUCATION/TRAINING PROGRAM

## 2022-01-28 PROCEDURE — C1874 STENT, COATED/COV W/DEL SYS: HCPCS | Performed by: INTERNAL MEDICINE

## 2022-01-28 PROCEDURE — C1757 CATH, THROMBECTOMY/EMBOLECT: HCPCS | Performed by: INTERNAL MEDICINE

## 2022-01-28 PROCEDURE — 96374 THER/PROPH/DIAG INJ IV PUSH: CPT

## 2022-01-28 PROCEDURE — 02C03ZZ EXTIRPATION OF MATTER FROM CORONARY ARTERY, ONE ARTERY, PERCUTANEOUS APPROACH: ICD-10-PCS | Performed by: INTERNAL MEDICINE

## 2022-01-28 PROCEDURE — 85049 AUTOMATED PLATELET COUNT: CPT | Performed by: INTERNAL MEDICINE

## 2022-01-28 PROCEDURE — 93005 ELECTROCARDIOGRAM TRACING: CPT

## 2022-01-28 DEVICE — XIENCE SKYPOINT™ EVEROLIMUS ELUTING CORONARY STENT SYSTEM 4.00 MM X 28 MM / RAPID-EXCHANGE
Type: IMPLANTABLE DEVICE | Status: FUNCTIONAL
Brand: XIENCE SKYPOINT™

## 2022-01-28 DEVICE — PERCLOSE™ PROSTYLE™ SUTURE-MEDIATED CLOSURE AND REPAIR SYSTEM
Type: IMPLANTABLE DEVICE | Status: FUNCTIONAL
Brand: PERCLOSE™ PROSTYLE™

## 2022-01-28 RX ORDER — FENTANYL CITRATE 50 UG/ML
INJECTION, SOLUTION INTRAMUSCULAR; INTRAVENOUS AS NEEDED
Status: DISCONTINUED | OUTPATIENT
Start: 2022-01-28 | End: 2022-01-28 | Stop reason: HOSPADM

## 2022-01-28 RX ORDER — MIDAZOLAM HYDROCHLORIDE 2 MG/2ML
INJECTION, SOLUTION INTRAMUSCULAR; INTRAVENOUS AS NEEDED
Status: DISCONTINUED | OUTPATIENT
Start: 2022-01-28 | End: 2022-01-28 | Stop reason: HOSPADM

## 2022-01-28 RX ORDER — ASPIRIN 81 MG/1
81 TABLET, CHEWABLE ORAL DAILY
Status: DISCONTINUED | OUTPATIENT
Start: 2022-01-29 | End: 2022-01-31 | Stop reason: HOSPADM

## 2022-01-28 RX ORDER — HEPARIN SODIUM 5000 [USP'U]/ML
5000 INJECTION, SOLUTION INTRAVENOUS; SUBCUTANEOUS EVERY 8 HOURS SCHEDULED
Status: DISCONTINUED | OUTPATIENT
Start: 2022-01-28 | End: 2022-01-31 | Stop reason: HOSPADM

## 2022-01-28 RX ORDER — LEVOTHYROXINE SODIUM 112 UG/1
112 TABLET ORAL
Status: DISCONTINUED | OUTPATIENT
Start: 2022-01-29 | End: 2022-01-31 | Stop reason: HOSPADM

## 2022-01-28 RX ORDER — NITROGLYCERIN 20 MG/100ML
INJECTION INTRAVENOUS AS NEEDED
Status: DISCONTINUED | OUTPATIENT
Start: 2022-01-28 | End: 2022-01-28 | Stop reason: HOSPADM

## 2022-01-28 RX ORDER — LIDOCAINE HYDROCHLORIDE 10 MG/ML
INJECTION, SOLUTION EPIDURAL; INFILTRATION; INTRACAUDAL; PERINEURAL AS NEEDED
Status: DISCONTINUED | OUTPATIENT
Start: 2022-01-28 | End: 2022-01-28 | Stop reason: HOSPADM

## 2022-01-28 RX ORDER — ATORVASTATIN CALCIUM 40 MG/1
40 TABLET, FILM COATED ORAL EVERY EVENING
Status: DISCONTINUED | OUTPATIENT
Start: 2022-01-28 | End: 2022-01-31 | Stop reason: HOSPADM

## 2022-01-28 RX ORDER — FENTANYL CITRATE 50 UG/ML
1 INJECTION, SOLUTION INTRAMUSCULAR; INTRAVENOUS ONCE
Status: COMPLETED | OUTPATIENT
Start: 2022-01-28 | End: 2022-01-28

## 2022-01-28 RX ORDER — AMLODIPINE BESYLATE 10 MG/1
10 TABLET ORAL DAILY
Status: DISCONTINUED | OUTPATIENT
Start: 2022-01-29 | End: 2022-01-31 | Stop reason: HOSPADM

## 2022-01-28 RX ORDER — ONDANSETRON 2 MG/ML
4 INJECTION INTRAMUSCULAR; INTRAVENOUS ONCE
Status: COMPLETED | OUTPATIENT
Start: 2022-01-28 | End: 2022-01-28

## 2022-01-28 RX ORDER — HEPARIN SODIUM 1000 [USP'U]/ML
INJECTION, SOLUTION INTRAVENOUS; SUBCUTANEOUS AS NEEDED
Status: DISCONTINUED | OUTPATIENT
Start: 2022-01-28 | End: 2022-01-28 | Stop reason: HOSPADM

## 2022-01-28 RX ORDER — ONDANSETRON 2 MG/ML
INJECTION INTRAMUSCULAR; INTRAVENOUS
Status: COMPLETED
Start: 2022-01-28 | End: 2022-01-28

## 2022-01-28 RX ORDER — FENTANYL CITRATE 50 UG/ML
50 INJECTION, SOLUTION INTRAMUSCULAR; INTRAVENOUS ONCE
Status: COMPLETED | OUTPATIENT
Start: 2022-01-28 | End: 2022-01-28

## 2022-01-28 RX ORDER — ASPIRIN 81 MG/1
324 TABLET, CHEWABLE ORAL ONCE
Status: DISCONTINUED | OUTPATIENT
Start: 2022-01-28 | End: 2022-01-30

## 2022-01-28 RX ORDER — ACETAMINOPHEN 325 MG/1
650 TABLET ORAL EVERY 4 HOURS PRN
Status: DISCONTINUED | OUTPATIENT
Start: 2022-01-28 | End: 2022-01-31 | Stop reason: HOSPADM

## 2022-01-28 RX ORDER — VERAPAMIL HCL 2.5 MG/ML
AMPUL (ML) INTRAVENOUS AS NEEDED
Status: DISCONTINUED | OUTPATIENT
Start: 2022-01-28 | End: 2022-01-28 | Stop reason: HOSPADM

## 2022-01-28 RX ORDER — NITROGLYCERIN 0.4 MG/1
0.4 TABLET SUBLINGUAL
Status: DISCONTINUED | OUTPATIENT
Start: 2022-01-28 | End: 2022-01-31 | Stop reason: HOSPADM

## 2022-01-28 RX ORDER — SODIUM CHLORIDE 9 MG/ML
200 INJECTION, SOLUTION INTRAVENOUS CONTINUOUS
Status: DISCONTINUED | OUTPATIENT
Start: 2022-01-28 | End: 2022-01-31

## 2022-01-28 RX ORDER — LISINOPRIL 20 MG/1
20 TABLET ORAL DAILY
Status: DISCONTINUED | OUTPATIENT
Start: 2022-01-29 | End: 2022-01-31 | Stop reason: HOSPADM

## 2022-01-28 RX ORDER — FENTANYL CITRATE 50 UG/ML
INJECTION, SOLUTION INTRAMUSCULAR; INTRAVENOUS
Status: COMPLETED
Start: 2022-01-28 | End: 2022-01-28

## 2022-01-28 RX ORDER — ONDANSETRON 2 MG/ML
4 INJECTION INTRAMUSCULAR; INTRAVENOUS EVERY 6 HOURS PRN
Status: DISCONTINUED | OUTPATIENT
Start: 2022-01-28 | End: 2022-01-31 | Stop reason: HOSPADM

## 2022-01-28 RX ORDER — HYDROXYUREA 500 MG/1
500 CAPSULE ORAL
Status: DISCONTINUED | OUTPATIENT
Start: 2022-01-30 | End: 2022-01-31 | Stop reason: HOSPADM

## 2022-01-28 RX ADMIN — ATORVASTATIN CALCIUM 40 MG: 40 TABLET, FILM COATED ORAL at 18:25

## 2022-01-28 RX ADMIN — ONDANSETRON 4 MG: 2 INJECTION INTRAMUSCULAR; INTRAVENOUS at 13:05

## 2022-01-28 RX ADMIN — ONDANSETRON 4 MG: 2 INJECTION INTRAMUSCULAR; INTRAVENOUS at 18:29

## 2022-01-28 RX ADMIN — TICAGRELOR 180 MG: 90 TABLET ORAL at 13:02

## 2022-01-28 RX ADMIN — FENTANYL CITRATE 50 MCG: 50 INJECTION INTRAMUSCULAR; INTRAVENOUS at 13:05

## 2022-01-28 RX ADMIN — FENTANYL CITRATE 50 MCG: 50 INJECTION, SOLUTION INTRAMUSCULAR; INTRAVENOUS at 13:05

## 2022-01-28 RX ADMIN — ACETAMINOPHEN 650 MG: 325 TABLET, FILM COATED ORAL at 22:21

## 2022-01-28 RX ADMIN — ACETAMINOPHEN 650 MG: 325 TABLET, FILM COATED ORAL at 16:53

## 2022-01-28 RX ADMIN — SODIUM CHLORIDE 200 ML/HR: 0.9 INJECTION, SOLUTION INTRAVENOUS at 16:54

## 2022-01-28 RX ADMIN — TICAGRELOR 90 MG: 90 TABLET ORAL at 20:29

## 2022-01-28 NOTE — H&P
Interventional Cardiology H&P   Woodrow Craig 78 y o  female MRN: [id number]  Unit/Bed#: [facility] CATH LAB ROOM Encounter: [id number]      Consults  PCP: Kayy Cohen DO   Outpatient Cardiologist:     History of Present Illness   Physician Requesting Consult: No att  providers found  Reason for Consult / Principal Problem: STEMi    HPI: Woodrow Craig is a 78y o  year old female with a history of hypothyroidism and thyrobocytosis presenting with substernal crushing chest pain  EMS for evaluation chest pain causing lightheadedness, diaphoresis, jaw pain, nausea and belching  She cleared the snow off of her Jeep and returned inside when symptoms began  She had similar symptoms approximately 2 months ago that were brief and more minor for which she took Tums with resolution, thinking it was gastritis  No known cardiac history patient reports history of hypertension and being a former smoker  EMS & ED COURSE:  - EMS transmitted a pre-hospital EKG demonstrating ST elevations inferolaterally for which a pre-hospital MI alert was called  Repeat EKG upon arrival showed similar elevations with reciprocal changes  Patient was continuing to have symptoms despite receiving full-dose aspirin and fentanyl pre-hospital   Her blood pressure was mildly low for EMS, thus no nitroglycerin or morphine were given  In the ED her blood pressure is normal   She was given Brilinta upon arrival   Her pain was mildly improved but she continues to have nausea and began vomiting in the ED  Arrival to cath lab patient complaining of mild chest pain and currently denies other symptoms offevers/chills, nausea/vomiting, abdominal pain, diarrhea, cough,  shortness of breath, PND, orthopnea, presyncopal symptoms, syncopal episodes  Review of Systems  Review of system was conducted and was negative except for as stated in the HPI        Historical Information   Past Medical History:   Diagnosis Date    Biliary colic     last assessed: 01/05/16    Cholecystitis     Disease of thyroid gland     hypo    Hematuria     last assessed: 05/05/17    Hypercalcemia 12/30/2019    Hypertension     Leukocytosis     last assessed: 04/05/16    Toxic nodular goiter     Uterine fibroid      Past Surgical History:   Procedure Laterality Date    HYSTERECTOMY      INGRIS, with removal of both ovaries    MD LAP,CHOLECYSTECTOMY N/A 3/9/2016    Procedure: LAPAROSCOPIC CHOLECYSTECTOMY ;  Surgeon: Nai Braswell MD;  Location: [facility] MAIN OR;  Service: General    TUBAL LIGATION       Social History     Substance and Sexual Activity   Alcohol Use No     Social History     Substance and Sexual Activity   Drug Use No     Social History     Tobacco Use   Smoking Status Former Smoker    Packs/day: 0 50    Years: 40 00    Pack years: 20 00    Quit date: 1/1/2012    Years since quitting: 10 0   Smokeless Tobacco Never Used     Family History: non-contributory    Meds/Allergies   Hospital Medications:   Current Facility-Administered Medications   Medication Dose Route Frequency    fentanyl citrate (PF) 100 MCG/2ML    PRN    heparin (porcine) injection    PRN    iohexol (OMNIPAQUE) 350 MG/ML injection (SINGLE-DOSE)    PRN    lidocaine (PF) (XYLOCAINE-MPF) 1 % injection    PRN    midazolam (VERSED) injection    PRN    nitroGLYcerin 200 mcg/mL IA bolus    PRN    verapamil (ISOPTIN) injection    PRN    verapamil (ISOPTIN) injection    PRN     Home Medications:   Medications Prior to Admission   Medication    amLODIPine (NORVASC) 5 mg tablet    aspirin 81 MG tablet    hydrochlorothiazide (MICROZIDE) 12 5 mg capsule    hydroxyurea (HYDREA) 500 mg capsule    Klor-Con M20 20 MEQ tablet    levothyroxine 112 mcg tablet    lisinopril (ZESTRIL) 20 mg tablet    Multiple Vitamin (MULTIVITAMIN) capsule    prednisoLONE acetate (PRED FORTE) 1 % ophthalmic suspension       No Known Allergies    Objective   Vitals: Blood pressure 119/59, pulse 71, temperature 98 1 °F (36 7 °C), temperature source Oral, resp  rate 18, SpO2 100 %  Orthostatic Blood Pressures      Most Recent Value   Blood Pressure 119/59 filed at 01/28/2022 1308            Invasive Devices  Report    Peripheral Intravenous Line            Peripheral IV 01/28/22 Right Antecubital <1 day          Drain            Closed/Suction Drain Right;Lateral RUQ Other (Comment) 10 Fr  2198 days                Physical Exam    GEN: Vishal Atwood appears well, alert and oriented x 3, pleasant and cooperative   HEENT:  Normocephalic, atraumatic, anicteric, moist mucous membranes  NECK: No JVD or carotid bruits   HEART: regular rhythm, regular rate, normal S1 and S2, no murmurs, clicks, gallops or rubs   LUNGS: Clear to auscultation bilaterally; no wheezes, rales, or rhonchi; respiration nonlabored   ABDOMEN:  Normoactive bowel sounds, soft, no tenderness, no distention  EXTREMITIES: peripheral pulses palpable; no edema  NEURO: no gross focal findings; cranial nerves grossly intact   SKIN:  Dry, intact, warm to touch    Lab Results:   I have personally reviewed pertinent lab results      CBC with diff:     CMP:   Results from last 7 days   Lab Units 01/28/22  1302   SODIUM mmol/L 137   CHLORIDE mmol/L 104   CO2 mmol/L 23   BUN mg/dL 19   CREATININE mg/dL 1 07   CALCIUM mg/dL 11 0*   AST U/L 20   ALT U/L 27   ALK PHOS U/L 98   EGFR ml/min/1 73sq m 51     HS Troponin:   0   Lab Value Date/Time    HSTNI0 86 (H) 01/28/2022 1302     BNP:   Results from last 7 days   Lab Units 01/28/22  1302   POTASSIUM mmol/L 3 6   CHLORIDE mmol/L 104   CO2 mmol/L 23   BUN mg/dL 19   CREATININE mg/dL 1 07   CALCIUM mg/dL 11 0*   EGFR ml/min/1 73sq m 51     Coags:   Results from last 7 days   Lab Units 01/28/22  1302   PTT seconds 18*   INR  0 94     TSH:     Magnesium:     Lipid Profile:         Results from last 7 days   Lab Units 01/28/22  1302   POTASSIUM mmol/L 3 6   CO2 mmol/L 23   CHLORIDE mmol/L 104   BUN mg/dL 19   CREATININE mg/dL 1 07 Results from last 7 days   Lab Units 01/28/22  1302   PTT seconds 18*             Imaging: I have personally reviewed pertinent reports  Telemetry:       EKG:         Previous STRESS TEST:  No results found for this or any previous visit  No results found for this or any previous visit  No results found for this or any previous visit  Previous Cath/PCI:  No results found for this or any previous visit  No results found for this or any previous visit  No results found for this or any previous visit  ECHO:  No results found for this or any previous visit  No results found for this or any previous visit  JIMMIE:  No results found for this or any previous visit  No results found for this or any previous visit  CMR:  No results found for this or any previous visit  No results found for this or any previous visit  No results found for this or any previous visit  HOLTER  No results found for this or any previous visit  No results found for this or any previous visit  VTE Prophylaxis:       Assessment/Plan   Louie Schneider is a 78y o  year old female with a history of hypothyroidism and thyrobocytosis presenting with substernal crushing chest pain  STEMI: 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 s/p thrombectomy, balloon and HANH with nice KIMANI flow 3   -monitor right arm as had difficulty with guiding catheters up the radial   -CBC and coags in the morning   -groin access for the procedure  -hold off on BB for now as patient was bradycardic   -DAPT continue   -high intensity statin   -telemetry       Case discussed and reviewed with Dr Karlo Edwards who agrees with my assessment and plan  Thank you for involving us in the care of your patient        Clhoe Epstein MD  Cardiology Fellow PGY-4    ==========================================================================================    Counseling / Coordination of Care  Total floor / unit time spent today 45 minutes minutes  Greater than 50% of total time was spent with the patient and / or family counseling and / or coordination of care  A description of the counseling / coordination of care:          Epic/ Allscripts/Care Everywhere records reviewed:     ** Please Note: Fluency DirectDictation voice to text software may have been used in the creation of this document   **

## 2022-01-28 NOTE — ED ATTENDING ATTESTATION
1/28/2022  Gail Peters DO, saw and evaluated the patient  I have discussed the patient with the resident/non-physician practitioner and agree with the resident's/non-physician practitioner's findings, Plan of Care, and MDM as documented in the resident's/non-physician practitioner's note, except where noted  All available labs and Radiology studies were reviewed  I was present for key portions of any procedure(s) performed by the resident/non-physician practitioner and I was immediately available to provide assistance  At this point I agree with the current assessment done in the Emergency Department  I have conducted an independent evaluation of this patient a history and physical is as follows:    Patient presents via EMS for evaluation chest pain causing lightheadedness, diaphoresis, jaw pain, nausea and belching  She cleared the snow off of her Jeep and returned inside when symptoms began  She had similar symptoms approximately 2 months ago that were brief and more minor for which she took Tums with resolution, thinking it was gastritis  No known cardiac history  She has not had a cardiac stress test for screening  She does have a h/o hypertension and was a former smoker  EMS transmitted a pre-hospital EKG demonstrating ST elevations inferolaterally for which a pre-hospital MI alert was called  Repeat EKG upon arrival showed similar elevations with reciprocal changes  Patient was continuing to have symptoms despite receiving full-dose aspirin and fentanyl pre-hospital   Her blood pressure was mildly low for EMS, thus no nitroglycerin or morphine were given  In the ED her blood pressure is normal   She was given Brilinta upon arrival   Her pain was mildly improved but she continues to have nausea and began vomiting in the ED  No recent travel or sick contacts  ROS: Denies f/c, HA, SOB, abdominal pain, diarrhea or dysuria  12 system ROS o/w negative       PE:  Moderate distress, appears uncomfortable, alert; PERRL, EOMI; MMM, no posterior oropharyngeal exudate, edema or erythema; HRR, no murmur, monitor shows sinus rhythm with diffuse inferolateral ST elevations bpm; lungs CTA w/o w/r/r, POx 100% on RA (nl); abdomen s/nt/nd, nl BS in all 4 quadrants; (-) LE edema or calf TTP, FROM extremities x4; skin p/w/d; CNs GI/NF, oriented  DDx: Chest pain - ACS/MI, doubt GERD, costochondritis, pneumonia, pneumothorax or PE  A/P:  MI alert called  Case discussed with the cardiology fellow, Gabino Dunham is completing a catheterization from an MI alert earlier and will take the patient immediately thereafter  Will check cardiac w/u, treat symptoms, admit  ED Course         Critical Care Time  CriticalCare Time  Performed by: Arpan Skaggs DO  Authorized by:  Arpan Skaggs DO     Critical care provider statement:     Critical care time (minutes):  30    Critical care time was exclusive of:  Separately billable procedures and treating other patients and teaching time    Critical care was necessary to treat or prevent imminent or life-threatening deterioration of the following conditions:  Cardiac failure    Critical care was time spent personally by me on the following activities:  Obtaining history from patient or surrogate, development of treatment plan with patient or surrogate, discussions with consultants, evaluation of patient's response to treatment, examination of patient, ordering and performing treatments and interventions, ordering and review of laboratory studies, ordering and review of radiographic studies, re-evaluation of patient's condition and review of old charts    I assumed direction of critical care for this patient from another provider in my specialty: no

## 2022-01-28 NOTE — ED PROVIDER NOTES
History  Chief Complaint   Patient presents with    Chest Pain     Pt was a MI alert, PATRIZIA [id number]     Patient is a 30-year-old female, past medical history of hypertension, who presents to the emergency department for chest pain  Patient states she was cleaning the snow off her car about an hour ago when she developed sudden-onset substernal chest pain described as a pressure  She states the pain radiates up to her jaw  She denies any prior history of pain like this in the past   Associated symptoms include shortness of breath which has subsequently resolved  She denies any other new or concerning symptoms  Of note, pre-hospital EKG showed significant ST elevations in leads 2 3 and AVF with reciprocal depressions  Patient was given 50 of fentanyl EN route, as well as aspirin  Patient states the pain improved with fentanyl  No prior history of heart attacks  Prior to Admission Medications   Prescriptions Last Dose Informant Patient Reported? Taking? Klor-Con M20 20 MEQ tablet   No No   Sig: Take 1/2 tablet daily   Multiple Vitamin (MULTIVITAMIN) capsule  Self Yes No   Sig: Take 1 capsule by mouth daily  amLODIPine (NORVASC) 5 mg tablet  Self No No   Sig: Take 2 tablets (10 mg total) by mouth daily   aspirin 81 MG tablet  Self Yes No   Sig: Take 81 mg by mouth daily     hydrochlorothiazide (MICROZIDE) 12 5 mg capsule  Self No No   Sig: Take 1 capsule (12 5 mg total) by mouth every morning   hydroxyurea (HYDREA) 500 mg capsule  Self No No   Sig: TAKE 1 CAPSULE BY MOUTH EVERY OTHER DAY   levothyroxine 112 mcg tablet  Self No No   Sig: Take 1 tablet (112 mcg total) by mouth daily   lisinopril (ZESTRIL) 20 mg tablet  Self No No   Sig: Take 1 tablet (20 mg total) by mouth daily   prednisoLONE acetate (PRED FORTE) 1 % ophthalmic suspension  Self Yes No   Patient not taking: Reported on 11/3/2021      Facility-Administered Medications: None       Past Medical History:   Diagnosis Date    Biliary colic last assessed: 01/05/16    Cholecystitis     Disease of thyroid gland     hypo    Hematuria     last assessed: 05/05/17    Hypercalcemia 12/30/2019    Hypertension     Leukocytosis     last assessed: 04/05/16    Toxic nodular goiter     Uterine fibroid        Past Surgical History:   Procedure Laterality Date    HYSTERECTOMY      INGRIS, with removal of both ovaries    [place] LAP,CHOLECYSTECTOMY N/A 3/9/2016    Procedure: LAPAROSCOPIC CHOLECYSTECTOMY ;  Surgeon: Terry Islas MD;  Location: [facility];  Service: General    TUBAL LIGATION         Family History   Problem Relation Age of Onset    Hypothyroidism Mother     Heart disease Brother         Cardiac disorder     I have reviewed and agree with the history as documented  E-Cigarette/Vaping     E-Cigarette/Vaping Substances     Social History     Tobacco Use    Smoking status: Former Smoker     Packs/day: 0 50     Years: 40 00     Pack years: 20 00     Quit date: 1/1/2012     Years since quitting: 10 0    Smokeless tobacco: Never Used   Substance Use Topics    Alcohol use: No    Drug use: No        Review of Systems   Constitutional: Negative for chills and fever  Respiratory: Positive for shortness of breath (Resolved)  Negative for cough  Cardiovascular: Positive for chest pain  Negative for leg swelling  Gastrointestinal: Positive for nausea  Negative for abdominal pain, diarrhea and vomiting  All other systems reviewed and are negative        Physical Exam  ED Triage Vitals   Temperature Pulse Respirations Blood Pressure SpO2   01/28/22 1308 01/28/22 1259 01/28/22 1259 01/28/22 1259 01/28/22 1259   98 1 °F (36 7 °C) 75 18 117/59 100 %      Temp Source Heart Rate Source Patient Position - Orthostatic VS BP Location FiO2 (%)   01/28/22 1308 01/28/22 1259 -- -- --   Oral Monitor         Pain Score       01/28/22 1308       5             Orthostatic Vital Signs  Vitals:    01/28/22 1530 01/28/22 1600 01/28/22 1630 01/28/22 1710   BP: 105/56 110/58 116/58 136/54   Pulse: 77 74 79 83       Physical Exam  Vitals and nursing note reviewed  Constitutional:       General: She is not in acute distress  Appearance: She is well-developed  She is not diaphoretic  HENT:      Head: Normocephalic and atraumatic  Right Ear: External ear normal       Left Ear: External ear normal       Nose: Nose normal    Eyes:      General: Lids are normal  No scleral icterus  Cardiovascular:      Rate and Rhythm: Normal rate and regular rhythm  Heart sounds: Normal heart sounds  No murmur heard  No friction rub  No gallop  Pulmonary:      Effort: Pulmonary effort is normal  No respiratory distress  Breath sounds: Normal breath sounds  No wheezing or rales  Abdominal:      Palpations: Abdomen is soft  Tenderness: There is no abdominal tenderness  There is no guarding  Musculoskeletal:         General: No deformity  Normal range of motion  Cervical back: Normal range of motion and neck supple  Skin:     General: Skin is warm and dry  Neurological:      General: No focal deficit present  Mental Status: She is alert     Psychiatric:         Mood and Affect: Mood normal          Behavior: Behavior normal          ED Medications  Medications   sodium chloride 0 9 % infusion (200 mL/hr Intravenous New Bag 1/28/22 1654)   nitroglycerin (NITROSTAT) SL tablet 0 4 mg (has no administration in time range)   atorvastatin (LIPITOR) tablet 40 mg (has no administration in time range)   aspirin chewable tablet 81 mg (has no administration in time range)   acetaminophen (TYLENOL) tablet 650 mg (650 mg Oral Given 1/28/22 1653)   heparin (porcine) subcutaneous injection 5,000 Units (5,000 Units Subcutaneous Not Given 1/28/22 1638)   ticagrelor (BRILINTA) tablet 90 mg (has no administration in time range)   aspirin chewable tablet 324 mg (324 mg Oral Not Given 1/28/22 1639)   ondansetron (ZOFRAN) injection 4 mg (has no administration in time range)   amLODIPine (NORVASC) tablet 10 mg (has no administration in time range)   hydroxyurea (HYDREA) capsule 500 mg (has no administration in time range)   levothyroxine tablet 112 mcg (has no administration in time range)   lisinopril (ZESTRIL) tablet 20 mg (has no administration in time range)   ticagrelor (BRILINTA) tablet 180 mg (180 mg Oral Given 1/28/22 1302)   fentanyl citrate (PF) 100 MCG/2ML 50 mcg (50 mcg Intravenous Given 1/28/22 1305)   ondansetron (ZOFRAN) injection 4 mg (4 mg Intravenous Given 1/28/22 1305)   fentanyl citrate (PF) (FOR EMS ONLY) 100 mcg/2 mL injection 100 mcg (0 mcg Does not apply Given to EMS 1/28/22 1309)       Diagnostic Studies  Results Reviewed     Procedure Component Value Units Date/Time    HS Troponin 0hr (reflex protocol) [[id number]]  (Abnormal) Collected: 01/28/22 1302    Lab Status: Final result Specimen: Blood from Arm, Left Updated: 01/28/22 1339     hs TnI 0hr 86 ng/L     Comprehensive metabolic panel [[id number]]  (Abnormal) Collected: 01/28/22 1302    Lab Status: Final result Specimen: Blood from Arm, Left Updated: 01/28/22 1332     Sodium 137 mmol/L      Potassium 3 6 mmol/L      Chloride 104 mmol/L      CO2 23 mmol/L      ANION GAP 10 mmol/L      BUN 19 mg/dL      Creatinine 1 07 mg/dL      Glucose 174 mg/dL      Calcium 11 0 mg/dL      AST 20 U/L      ALT 27 U/L      Alkaline Phosphatase 98 U/L      Total Protein 8 1 g/dL      Albumin 3 8 g/dL      Total Bilirubin 0 33 mg/dL      eGFR 51 ml/min/1 73sq m     Narrative:      [place] guidelines for Chronic Kidney Disease (CKD):     Stage 1 with normal or high GFR (GFR > 90 mL/min/1 73 square meters)    Stage 2 Mild CKD (GFR = 60-89 mL/min/1 73 square meters)    Stage 3A Moderate CKD (GFR = 45-59 mL/min/1 73 square meters)    Stage 3B Moderate CKD (GFR = 30-44 mL/min/1 73 square meters)    Stage 4 Severe CKD (GFR = 15-29 mL/min/1 73 square meters)    Stage 5 End Stage CKD (GFR <15 mL/min/1 73 square meters)  Note: GFR calculation is accurate only with a steady state creatinine    APTT [[id number]]  (Abnormal) Collected: 01/28/22 1302    Lab Status: Final result Specimen: Blood from Arm, Left Updated: 01/28/22 1328     PTT 18 seconds     Protime-INR [[id number]]  (Normal) Collected: 01/28/22 1302    Lab Status: Final result Specimen: Blood from Arm, Left Updated: 01/28/22 1328     Protime 12 2 seconds      INR 0 94    CBC and differential [[id number]] Collected: 01/28/22 1302    Lab Status: No result Specimen: Blood from Arm, Left                  No orders to display         Procedures  ECG 12 Lead Documentation Only    Date/Time: 1/28/2022 1:00 PM  Performed by: Marlon Gonsales DO  Authorized by: Marlon Gonsales DO     ECG reviewed by me, the ED Provider: yes    Patient location:  ED  Interpretation:     Interpretation: abnormal    Rate:     ECG rate assessment: normal    Rhythm:     Rhythm: sinus rhythm    Ectopy:     Ectopy: none    QRS:     QRS axis:  Normal  Conduction:     Conduction: normal    ST segments:     ST segments:  Abnormal    Elevation:  II, III, aVF, V4, V5 and V6    Depression:  V1, V2 and aVL  T waves:     T waves: normal        Conscious Sedation Assessment      Classification Score   ASA Scale Assessment 3-Severe systemic disease that results in functional limitation filed at 01/28/2022 1317   Mallampati Classification Class II: soft palate, uvula, fauces visible - No Difficulty filed at 01/28/2022 1317          ED Course  ED Course as of 01/28/22 1710   Fri Jan 28, 2022   1248 Discussed case with Dr Katy Dodge (cardiology fellow)  Will tiger text me after looking at EKG   1325 Discussed case with Dr De Los Santos Other  Recommends Brillinta  No other medications  Will go to cath lab                                          MDM  Number of Diagnoses or Management Options  STEMI (ST elevation myocardial infarction) [facility])  Diagnosis management comments: Patient is a 78 y o  female who presented to the ED for chest pain  Pre-hospital EKG shows ST elevation  Plan: Labs, EKG, Brilinta, cath lab activation             Portions of the record may have been created with voice recognition software  Occasional wrong word or "sound a like" substitutions may have occurred due to the inherent limitations of voice recognition software  Read the chart carefully and recognize, using context, where substitutions have occurred  Amount and/or Complexity of Data Reviewed  Clinical lab tests: ordered and reviewed  Discuss the patient with other providers: yes    Risk of Complications, Morbidity, and/or Mortality  Presenting problems: high  Diagnostic procedures: moderate  Management options: high    Patient Progress  Patient progress: stable      Disposition  Final diagnoses:   STEMI (ST elevation myocardial infarction) ([facility] )     Time reflects when diagnosis was documented in both MDM as applicable and the Disposition within this note     Time User Action Codes Description Comment    1/28/2022  1:09 PM Benedict Gallegos Add [I21 3] STEMI (ST elevation myocardial infarction) ([facility] )     1/28/2022  2:21 PM Dafne Swanson Modify [I21 3] STEMI (ST elevation myocardial infarction) ([place] [address] )     1/28/2022  2:21 PM Mena Spivey Add [I21 11] STEMI involving right coronary artery [facility])       ED Disposition     ED Disposition Condition Date/Time Comment    Send to Cath Lab  Fri Jan 28, 2022  1:00 PM       Follow-up Information    None         Current Discharge Medication List      CONTINUE these medications which have NOT CHANGED    Details   amLODIPine (NORVASC) 5 mg tablet Take 2 tablets (10 mg total) by mouth daily  Qty: 180 tablet, Refills: 1    Comments: New dose as of 2/18/19  Associated Diagnoses: Benign essential hypertension; Benign hypertension      aspirin 81 MG tablet Take 81 mg by mouth daily        hydrochlorothiazide (MICROZIDE) 12 5 mg capsule Take 1 capsule (12 5 mg total) by mouth every morning  Qty: 90 capsule, Refills: 2    Associated Diagnoses: Hyponatremia      hydroxyurea (HYDREA) 500 mg capsule TAKE 1 CAPSULE BY MOUTH EVERY OTHER DAY  Qty: 30 capsule, Refills: 11    Associated Diagnoses: Essential thrombocythemia (HCC)      Klor-Con M20 20 MEQ tablet Take 1/2 tablet daily  Qty: 45 tablet, Refills: 1    Associated Diagnoses: Hypokalemia      levothyroxine 112 mcg tablet Take 1 tablet (112 mcg total) by mouth daily  Qty: 90 tablet, Refills: 1    Associated Diagnoses: Acquired hypothyroidism      lisinopril (ZESTRIL) 20 mg tablet Take 1 tablet (20 mg total) by mouth daily  Qty: 90 tablet, Refills: 1    Associated Diagnoses: Benign essential hypertension      Multiple Vitamin (MULTIVITAMIN) capsule Take 1 capsule by mouth daily  prednisoLONE acetate (PRED FORTE) 1 % ophthalmic suspension            No discharge procedures on file  PDMP Review     None           ED Provider  Attending physically available and evaluated Michelle Stewart I managed the patient along with the ED Attending      Electronically Signed by         Libia Roldan DO  01/28/22 8477

## 2022-01-28 NOTE — PROGRESS NOTES
Pt transported to [facility] holding area  Right Wrist site assessed  Site stable, no bleeding or swelling  Patient aware of their bed rest and restrictions  The patient is alert and oriented, denies pain at the moment  Call bell within reach and bed in low position

## 2022-01-29 LAB
ALBUMIN SERPL BCP-MCNC: 2.6 G/DL (ref 3.5–5)
ALP SERPL-CCNC: 59 U/L (ref 46–116)
ALT SERPL W P-5'-P-CCNC: 26 U/L (ref 12–78)
ANION GAP SERPL CALCULATED.3IONS-SCNC: 6 MMOL/L (ref 4–13)
AORTIC ROOT: 2.9 CM
APICAL FOUR CHAMBER EJECTION FRACTION: 52 %
ASCENDING AORTA: 3.1 CM (ref 1.96–2.94)
AST SERPL W P-5'-P-CCNC: 46 U/L (ref 5–45)
ATRIAL RATE: 85 BPM
BASOPHILS # BLD AUTO: 0.05 THOUSANDS/ΜL (ref 0–0.1)
BASOPHILS NFR BLD AUTO: 0 % (ref 0–1)
BILIRUB SERPL-MCNC: 0.71 MG/DL (ref 0.2–1)
BUN SERPL-MCNC: 21 MG/DL (ref 5–25)
CALCIUM ALBUM COR SERPL-MCNC: 10.1 MG/DL (ref 8.3–10.1)
CALCIUM SERPL-MCNC: 9 MG/DL (ref 8.3–10.1)
CHLORIDE SERPL-SCNC: 109 MMOL/L (ref 100–108)
CO2 SERPL-SCNC: 23 MMOL/L (ref 21–32)
CREAT SERPL-MCNC: 0.78 MG/DL (ref 0.6–1.3)
E WAVE DECELERATION TIME: 248 MS
EOSINOPHIL # BLD AUTO: 0.01 THOUSAND/ΜL (ref 0–0.61)
EOSINOPHIL NFR BLD AUTO: 0 % (ref 0–6)
ERYTHROCYTE [DISTWIDTH] IN BLOOD BY AUTOMATED COUNT: 13.7 % (ref 11.6–15.1)
FRACTIONAL SHORTENING: 28 % (ref 28–44)
GFR SERPL CREATININE-BSD FRML MDRD: 75 ML/MIN/1.73SQ M
GLUCOSE SERPL-MCNC: 128 MG/DL (ref 65–140)
HCT VFR BLD AUTO: 28.3 % (ref 34.8–46.1)
HGB BLD-MCNC: 9.4 G/DL (ref 11.5–15.4)
IMM GRANULOCYTES # BLD AUTO: 0.08 THOUSAND/UL (ref 0–0.2)
IMM GRANULOCYTES NFR BLD AUTO: 1 % (ref 0–2)
INTERVENTRICULAR SEPTUM IN DIASTOLE (PARASTERNAL SHORT AXIS VIEW): 1.2 CM (ref 0.52–0.97)
LEFT ATRIUM AREA SYSTOLE SINGLE PLANE A4C: 18.3 CM2
LEFT ATRIUM SIZE: 3.6 CM
LEFT INTERNAL DIMENSION IN SYSTOLE: 2.9 CM (ref 2.1–4)
LEFT VENTRICLE DIASTOLIC VOLUME (MOD BIPLANE): 76 ML (ref 86.06–193.81)
LEFT VENTRICLE SYSTOLIC VOLUME (MOD BIPLANE): 37 ML
LEFT VENTRICULAR INTERNAL DIMENSION IN DIASTOLE: 4 CM (ref 4.27–6.36)
LEFT VENTRICULAR POSTERIOR WALL IN END DIASTOLE: 1.2 CM (ref 0.51–0.96)
LEFT VENTRICULAR STROKE VOLUME: 37 ML
LV EF: 52 %
LYMPHOCYTES # BLD AUTO: 1.63 THOUSANDS/ΜL (ref 0.6–4.47)
LYMPHOCYTES NFR BLD AUTO: 12 % (ref 14–44)
MAGNESIUM SERPL-MCNC: 2 MG/DL (ref 1.6–2.6)
MCH RBC QN AUTO: 33.2 PG (ref 26.8–34.3)
MCHC RBC AUTO-ENTMCNC: 33.2 G/DL (ref 31.4–37.4)
MCV RBC AUTO: 100 FL (ref 82–98)
MONOCYTES # BLD AUTO: 1.25 THOUSAND/ΜL (ref 0.17–1.22)
MONOCYTES NFR BLD AUTO: 9 % (ref 4–12)
MV E'TISSUE VEL-SEP: 5 CM/S
MV PEAK A VEL: 1.1 M/S
MV PEAK E VEL: 56 CM/S
MV STENOSIS PRESSURE HALF TIME: 0 MS
NEUTROPHILS # BLD AUTO: 10.67 THOUSANDS/ΜL (ref 1.85–7.62)
NEUTS SEG NFR BLD AUTO: 78 % (ref 43–75)
NRBC BLD AUTO-RTO: 0 /100 WBCS
P AXIS: 55 DEGREES
PLATELET # BLD AUTO: 378 THOUSANDS/UL (ref 149–390)
PMV BLD AUTO: 9.1 FL (ref 8.9–12.7)
POTASSIUM SERPL-SCNC: 3.7 MMOL/L (ref 3.5–5.3)
PR INTERVAL: 170 MS
PROT SERPL-MCNC: 5.3 G/DL (ref 6.4–8.2)
QRS AXIS: -12 DEGREES
QRSD INTERVAL: 68 MS
QT INTERVAL: 418 MS
QTC INTERVAL: 497 MS
RBC # BLD AUTO: 2.83 MILLION/UL (ref 3.81–5.12)
RIGHT ATRIUM AREA SYSTOLE A4C: 12.4 CM2
RIGHT VENTRICLE ID DIMENSION: 3 CM
SL CV LV DIAS VOL ENDO Z SCORE: -2.37
SL CV LV EF: 55
SL CV PED ECHO LEFT VENTRICLE DIASTOLIC VOLUME (MOD BIPLANE) 2D: 70 ML
SL CV PED ECHO LEFT VENTRICLE SYSTOLIC VOLUME (MOD BIPLANE) 2D: 33 ML
SODIUM SERPL-SCNC: 138 MMOL/L (ref 136–145)
T WAVE AXIS: -35 DEGREES
VENTRICULAR RATE: 85 BPM
WBC # BLD AUTO: 13.69 THOUSAND/UL (ref 4.31–10.16)
Z-SCORE OF ASCENDING AORTA: 2.65
Z-SCORE OF INTERVENTRICULAR SEPTUM IN END DIASTOLE: 3.94
Z-SCORE OF LEFT VENTRICULAR DIMENSION IN END SYSTOLE: -2.65
Z-SCORE OF LEFT VENTRICULAR POSTERIOR WALL IN END DIASTOLE: 4.05

## 2022-01-29 PROCEDURE — 83735 ASSAY OF MAGNESIUM: CPT | Performed by: INTERNAL MEDICINE

## 2022-01-29 PROCEDURE — 99233 SBSQ HOSP IP/OBS HIGH 50: CPT | Performed by: INTERNAL MEDICINE

## 2022-01-29 PROCEDURE — 80053 COMPREHEN METABOLIC PANEL: CPT | Performed by: INTERNAL MEDICINE

## 2022-01-29 PROCEDURE — 85025 COMPLETE CBC W/AUTO DIFF WBC: CPT | Performed by: STUDENT IN AN ORGANIZED HEALTH CARE EDUCATION/TRAINING PROGRAM

## 2022-01-29 PROCEDURE — 93010 ELECTROCARDIOGRAM REPORT: CPT | Performed by: INTERNAL MEDICINE

## 2022-01-29 RX ADMIN — ASPIRIN 81 MG CHEWABLE TABLET 81 MG: 81 TABLET CHEWABLE at 08:03

## 2022-01-29 RX ADMIN — HEPARIN SODIUM 5000 UNITS: 5000 INJECTION INTRAVENOUS; SUBCUTANEOUS at 21:43

## 2022-01-29 RX ADMIN — ATORVASTATIN CALCIUM 40 MG: 40 TABLET, FILM COATED ORAL at 17:08

## 2022-01-29 RX ADMIN — METOPROLOL TARTRATE 25 MG: 25 TABLET, FILM COATED ORAL at 10:50

## 2022-01-29 RX ADMIN — HEPARIN SODIUM 5000 UNITS: 5000 INJECTION INTRAVENOUS; SUBCUTANEOUS at 13:44

## 2022-01-29 RX ADMIN — LEVOTHYROXINE SODIUM 112 MCG: 112 TABLET ORAL at 06:23

## 2022-01-29 RX ADMIN — TICAGRELOR 90 MG: 90 TABLET ORAL at 21:43

## 2022-01-29 RX ADMIN — SODIUM CHLORIDE 200 ML/HR: 0.9 INJECTION, SOLUTION INTRAVENOUS at 00:36

## 2022-01-29 RX ADMIN — TICAGRELOR 90 MG: 90 TABLET ORAL at 08:06

## 2022-01-29 RX ADMIN — METOPROLOL TARTRATE 25 MG: 25 TABLET, FILM COATED ORAL at 21:43

## 2022-01-29 RX ADMIN — ACETAMINOPHEN 650 MG: 325 TABLET, FILM COATED ORAL at 23:38

## 2022-01-29 NOTE — UTILIZATION REVIEW
Initial Clinical Review    Admission: Date/Time/Statement:   Admission Orders (From admission, onward)     Ordered        01/28/22 1415  Inpatient Admission  Once                      Orders Placed This Encounter   Procedures    Inpatient Admission     Standing Status:   Standing     Number of Occurrences:   1     Order Specific Question:   Level of Care     Answer:   Critical Care [15]     Order Specific Question:   Estimated length of stay     Answer:   More than 2 Midnights     Order Specific Question:   Certification     Answer:   I certify that inpatient services are medically necessary for this patient for a duration of greater than two midnights  See H&P and MD Progress Notes for additional information about the patient's course of treatment  ED Arrival Information     Expected Arrival Acuity    - 1/28/2022 12:53 Immediate         Means of arrival Escorted by Service Admission type    Ambulance [facility] EMS Cardiology Emergency         Arrival complaint    MI Alert        Chief Complaint   Patient presents with    Chest Pain     Pt was a MI alert, PATRIZIA [id number]       Initial Presentation:   78y o  year old female with a history of hypothyroidism and thyrobocytosis presenting with substernal crushing chest pain    EMS transmitted a pre-hospital EKG demonstrating ST elevations inferolaterally for which a pre-hospital MI alert was called   Repeat EKG upon arrival showed similar elevations with reciprocal changes   Patient was continuing to have symptoms despite receiving full-dose aspirin and fentanyl pre-hospital  Sallie Sincere blood pressure was mildly low for EMS, thus no nitroglycerin or morphine were given   In the ED her blood pressure is normal   She was given Brilinta upon arrival   Her pain was mildly improved but she continues to have nausea and began vomiting in the ED  Arrival to cath lab patient complaining of mild chest pain and currently denies other symptoms offevers/chills, nausea/vomiting, abdominal pain, diarrhea, cough,  shortness of breath, PND, orthopnea, presyncopal symptoms, syncopal episodes  Admitted Inpatient   STEMI: 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 s/p thrombectomy, balloon and HANH with nice KIMANI flow 3   -monitor right arm as had difficulty with guiding catheters up the radial   -CBC and coags in the morning   -groin access for the procedure  -hold off on BB for now as patient was bradycardic   -DAPT continue   -high intensity statin   -telemetry     Date: 1/29/22   Day 3   78y o  year old female with a history of hypothyroidism and thyrombocytosis presented with substernal crushing chest pain while shoveling snow  Was found to have interolateral ST elevations  Was found to have complete thrombotic occlusion of the mRCA s/p thrombectomy, angioplasty and PCI of mRCA  Principal Problem:    STEMI involving right coronary artery ([facility] )  1  Inferolateral STEMI s/p RCA thrombectomy, angioplasty and PCI of mRCA  --> Right groin hematoma, appears table and minimally tender to palpation  Distal pulses are palpable  2  Hypothyroidism  3  HTN // HLD  Plan:  - c/w DAPT x1 Year  - c/w Norvasc 10mg PO Daily  - c/w Lipitor 40mg PO QHS  - c/w Lisinopril 20mg PO Daily  - Start Metoprolol Tart   25mg PO BID  - f/u Hgb in AM, today it was 9 4 and last reading was 15 in October 2021    - Can be downgraded from CCU, potential d/c tomorrow  ED Triage Vitals   Temperature Pulse Respirations Blood Pressure SpO2   01/28/22 1308 01/28/22 1259 01/28/22 1259 01/28/22 1259 01/28/22 1259   98 1 °F (36 7 °C) 75 18 117/59 100 %      Temp Source Heart Rate Source Patient Position - Orthostatic VS BP Location FiO2 (%)   01/28/22 1308 01/28/22 1259 01/29/22 0700 01/29/22 0700 --   Oral Monitor Lying Right arm       Pain Score       01/28/22 1308       5          Wt Readings from Last 1 Encounters:   01/28/22 76 7 kg (169 lb)     Additional Vital Signs:   /29/22 0600 -- 78 25 Abnormal  93/51 67 96 % -- -- -- --   01/29/22 0500 -- 75 22 94/54 72 94 % -- -- -- --   01/29/22 0400 98 6 °F (37 °C) 66 26 Abnormal  84/46 Abnormal  63 95 % -- -- -- --   01/29/22 0300 -- 81 22 97/52 72 93 % -- -- -- --   01/29/22 0200 -- 74 18 96/50 68 96 % -- -- -- --   01/29/22 0100 -- 77 27 Abnormal  95/51 71 95 % -- -- -- --   01/29/22 0000 -- 81 16 108/54 78 94 % -- -- -- --   01/28/22 2300 99 8 °F (37 7 °C) 79 21 97/51 69 95 % -- -- -- --   01/28/22 2200 -- 89 24 Abnormal  114/56 81 97 % -- -- -- --   01/28/22 2100 -- 78 27 Abnormal  131/59 85 95 % -- -- -- --   01/28/22 2000 98 °F (36 7 °C) 81 28 Abnormal  98/54 71 95 % -- -- None (Room air) --   01/28/22 1900 -- 72 19 95/51 69 95 % -- -- -- --   01/28/22 1830 -- 80 37 Abnormal  107/57 77 97 % -- -- -- --   01/28/22 1810 -- 83 -- 136/54 -- -- -- -- -- --   01/28/22 1800 -- 79 22 92/52 68 99 % -- -- -- --   01/28/22 1710 -- 83 25 Abnormal  136/54 78 100 % 28 2 L/min Nasal cannula --     Pertinent Labs/Diagnostic Test Results:       Results from last 7 days   Lab Units 01/29/22  0951 01/28/22  1512   WBC Thousand/uL 13 69*  --    HEMOGLOBIN g/dL 9 4*  --    HEMATOCRIT % 28 3*  --    PLATELETS Thousands/uL 378 425*   NEUTROS ABS Thousands/µL 10 67*  --      Results from last 7 days   Lab Units 01/29/22  0428 01/28/22  1302   SODIUM mmol/L 138 137   POTASSIUM mmol/L 3 7 3 6   CHLORIDE mmol/L 109* 104   CO2 mmol/L 23 23   ANION GAP mmol/L 6 10   BUN mg/dL 21 19   CREATININE mg/dL 0 78 1 07   EGFR ml/min/1 73sq m 75 51   CALCIUM mg/dL 9 0 11 0*   MAGNESIUM mg/dL 2 0  --      Results from last 7 days   Lab Units 01/29/22  0428 01/28/22  1302   AST U/L 46* 20   ALT U/L 26 27   ALK PHOS U/L 59 98   TOTAL PROTEIN g/dL 5 3* 8 1   ALBUMIN g/dL 2 6* 3 8   TOTAL BILIRUBIN mg/dL 0 71 0 33     Results from last 7 days   Lab Units 01/29/22  0428 01/28/22  1302   GLUCOSE RANDOM mg/dL 128 174*     Results from last 7 days   Lab Units 01/28/22  1702 01/28/22  1512 01/28/22  1302   HS TNI 0HR ng/L  --   --  86*   HS TNI 2HR ng/L  --  1,308*  --    HSTNI D2 ng/L  --  1,222*  --    HS TNI 4HR ng/L 3,319*  --   --    HSTNI D4 ng/L 3,233*  --   --      Results from last 7 days   Lab Units 01/28/22  1302   PROTIME seconds 12 2   INR  0 94   PTT seconds 18*     ED Treatment:   Medication Administration from 01/28/2022 1242 to 01/28/2022 1328       Date/Time Order Dose Route Action Action by Comments     01/28/2022 1302 ticagrelor (BRILINTA) tablet 180 mg 180 mg Oral Given Geni Rojas RN      83/47/6792 1305 fentanyl citrate (PF) 100 MCG/2ML 50 mcg 50 mcg Intravenous Given Geni Rojas RN      78/07/7814 1305 ondansetron (ZOFRAN) injection 4 mg 4 mg Intravenous Given Geni Rojas RN      26/91/9481 1309 fentanyl citrate (PF) (FOR EMS ONLY) 100 mcg/2 mL injection 100 mcg 0 mcg Does not apply Given to EMS Geni Rojas RN         Past Medical History:   Diagnosis Date    Biliary colic     last assessed: 01/05/16    Cholecystitis     Disease of thyroid gland     hypo    Hematuria     last assessed: 05/05/17    Hypercalcemia 12/30/2019    Hypertension     Leukocytosis     last assessed: 04/05/16    Toxic nodular goiter     Uterine fibroid      Present on Admission:   STEMI involving right coronary artery [facility])      Admitting Diagnosis: STEMI (ST elevation myocardial infarction) ([facility] [address] ) [I21 3]  [facility] Stroke Scale level of consciousness score 1, not alert; but arousable by minor stimulation to obey, answer, or respond [Z78 9]  Age/Sex: 78 y o  female  Admission Orders:  Critical care  telemetry  ekg  Cbc diff  I/o  Post procedure bed rest  continuous ST segment monitoring  Puncture site care  Cardiac diet  Scheduled Medications:  amLODIPine, 10 mg, Oral, Daily  aspirin, 324 mg, Oral, Once  aspirin, 81 mg, Oral, Daily  atorvastatin, 40 mg, Oral, QPM  heparin (porcine), 5,000 Units, Subcutaneous, Q8H [address]  [START ON 1/30/2022] hydroxyurea, 500 mg, Oral, Q48H  levothyroxine, 112 mcg, Oral, Early Morning  lisinopril, 20 mg, Oral, Daily  metoprolol tartrate, 25 mg, Oral, Q12H NALINI  ticagrelor, 90 mg, Oral, Q12H [address]      Continuous IV Infusions:  sodium chloride, 200 mL/hr, Intravenous, Continuous      PRN Meds:  acetaminophen, 650 mg, Oral, Q4H PRN  nitroglycerin, 0 4 mg, Sublingual, Q5 Min PRN  ondansetron, 4 mg, Intravenous, Q6H PRN        IP CONSULT TO NUTRITION SERVICES    Network Utilization Review Department  ATTENTION: Please call with any questions or concerns to [phone] and carefully listen to the prompts so that you are directed to the right person  All voicemails are confidential   Liz Back all requests for admission clinical reviews, approved or denied determinations and any other requests to dedicated fax number below belonging to the campus where the patient is receiving treatment   List of dedicated fax numbers for the Facilities:  [address] DENIALS (Administrative/Medical Necessity) [phone]   [address] (Maternity/NICU/Pediatrics) [phone]   [address] [place] [address] [address] [place] [address]  [address] [facility] [place] [address] [address] [place] [address] Aline Merino [address] [facility] [phone]

## 2022-01-29 NOTE — PROGRESS NOTES
Cardiology Team 2 Progress Note - Ger Sagastume 78 y o  female MRN: [id number]    Unit/Bed#: [facility] 062-16 Encounter: [id number]          Subjective:   Patient seen and examined  No significant events overnight  Groin hematoma is stable, minimally tender to palpation  Hospital Course:   Ger Sagastume is a 78y o  year old female with a history of hypothyroidism and thyrombocytosis presented with substernal crushing chest pain while shoveling snow  Was found to have interolateral ST elevations  Was found to have complete thrombotic occlusion of the mRCA s/p thrombectomy, angioplasty and PCI of mRCA         Vitals: Blood pressure 98/55, pulse 77, temperature 98 5 °F (36 9 °C), temperature source Oral, resp  rate (!) 26, height 5' 3" (1 6 m), weight 76 7 kg (169 lb), SpO2 95 %  , Body mass index is 29 94 kg/m² ,   Orthostatic Blood Pressures      Most Recent Value   Blood Pressure 98/55 filed at 01/29/2022 0850   Patient Position - Orthostatic VS Lying filed at 01/29/2022 0700            Intake/Output Summary (Last 24 hours) at 1/29/2022 0853  Last data filed at 1/29/2022 5889  Gross per 24 hour   Intake 1900 ml   Output 183 ml   Net 1717 ml       Review of System:  Review of system was conducted and was negative except for as stated in the subjective course  Physical Exam:    GEN: Ger Sagastume appears well, alert and oriented x 3, pleasant and cooperative   HEENT:  Normocephalic, atraumatic, anicteric, moist mucous membranes  NECK: No JVD or carotid bruits   HEART: reg rhythm, reg rate, normal S1 and S2, no murmurs, clicks, gallops or rubs   LUNGS: Clear to auscultation bilaterally; no wheezes, rales, or rhonchi; respiration nonlabored   ABDOMEN:  Normoactive bowel sounds, soft, no tenderness, no distention  EXTREMITIES: peripheral pulses palpable; no edema, groin hematoma, stable  Minimally TTP    NEURO: no gross focal findings; cranial nerves grossly intact   SKIN:  Dry, intact, warm to touch      Current Facility-Administered Medications:     acetaminophen (TYLENOL) tablet 650 mg, 650 mg, Oral, Q4H PRN, LUIZA Cueva, 650 mg at 01/28/22 2221    amLODIPine (NORVASC) tablet 10 mg, 10 mg, Oral, Daily, LUIZA Cueva    aspirin chewable tablet 324 mg, 324 mg, Oral, Once, LUIZA Cueva    aspirin chewable tablet 81 mg, 81 mg, Oral, Daily, LUIZA Cueva, 81 mg at 01/29/22 0803    atorvastatin (LIPITOR) tablet 40 mg, 40 mg, Oral, QPM, LUIZA Cueva, 40 mg at 01/28/22 1825    heparin (porcine) subcutaneous injection 5,000 Units, 5,000 Units, Subcutaneous, Q8H [address] **AND** [COMPLETED] Platelet count, , , Once, LUIZA Cueva    [START ON 1/30/2022] hydroxyurea (HYDREA) capsule 500 mg, 500 mg, Oral, Q48H, LUIZA Cueva    levothyroxine tablet 112 mcg, 112 mcg, Oral, Early Morning, LUIZA Cueva, 112 mcg at 01/29/22 3659    lisinopril (ZESTRIL) tablet 20 mg, 20 mg, Oral, Daily, LUIZA Cueva    nitroglycerin (NITROSTAT) SL tablet 0 4 mg, 0 4 mg, Sublingual, Q5 Min PRN, LUIZA Cueva    ondansetron [facility]) injection 4 mg, 4 mg, Intravenous, Q6H PRN, LUIZA Cueva, 4 mg at 01/28/22 1829    sodium chloride 0 9 % infusion, 200 mL/hr, Intravenous, Continuous, LUIZA Cueva, Last Rate: 200 mL/hr at 01/29/22 0036, 200 mL/hr at 01/29/22 0036    ticagrelor (BRILINTA) tablet 90 mg, 90 mg, Oral, Q12H [address], LUIZA Cueva, 90 mg at 01/29/22 8870    Labs & Results:          Results from last 7 days   Lab Units 01/29/22  0428 01/28/22  1302   POTASSIUM mmol/L 3 7 3 6   CO2 mmol/L 23 23   CHLORIDE mmol/L 109* 104   BUN mg/dL 21 19   CREATININE mg/dL 0 78 1 07     Results from last 7 days   Lab Units 01/28/22  1512   PLATELETS Thousands/uL 425*           Telemetry:   Personally reviewed by Dary Puentes MD:     VTE Prophylaxis: Heparin        Assessment:  Principal Problem:    STEMI involving right coronary artery ([facility] [address] )        1   Inferolateral STEMI s/p RCA thrombectomy, angioplasty and PCI of mRCA  --> Right groin hematoma, appears table and minimally tender to palpation  Distal pulses are palpable  2  Hypothyroidism  3  HTN // HLD      Plan:  - c/w DAPT x1 Year  - c/w Norvasc 10mg PO Daily  - c/w Lipitor 40mg PO QHS  - c/w Lisinopril 20mg PO Daily  - Start Metoprolol Tart  25mg PO BID  - f/u Hgb in AM, today it was 9 4 and last reading was 15 in October 2021    - Can be downgraded from CCU, potential d/c tomorrow    Case discussed and reviewed with Dr Chidi Yepez who agrees with my assessment and plan  Thank you for involving us in the care of your patient  Nguyen Taylor MD  Cardiology Fellow PGY-5      Epic/ Allscripts/Care Everywhere records reviewed:     ** Please Note: Fluency DirectDictation voice to text software may have been used in the creation of this document   **

## 2022-01-30 ENCOUNTER — APPOINTMENT (INPATIENT)
Dept: NON INVASIVE DIAGNOSTICS | Facility: HOSPITAL | Age: 77
DRG: 247 | End: 2022-01-30
Payer: COMMERCIAL

## 2022-01-30 PROCEDURE — 93926 LOWER EXTREMITY STUDY: CPT

## 2022-01-30 PROCEDURE — 99232 SBSQ HOSP IP/OBS MODERATE 35: CPT | Performed by: INTERNAL MEDICINE

## 2022-01-30 RX ADMIN — HEPARIN SODIUM 5000 UNITS: 5000 INJECTION INTRAVENOUS; SUBCUTANEOUS at 06:27

## 2022-01-30 RX ADMIN — HEPARIN SODIUM 5000 UNITS: 5000 INJECTION INTRAVENOUS; SUBCUTANEOUS at 21:20

## 2022-01-30 RX ADMIN — TICAGRELOR 90 MG: 90 TABLET ORAL at 21:20

## 2022-01-30 RX ADMIN — HEPARIN SODIUM 5000 UNITS: 5000 INJECTION INTRAVENOUS; SUBCUTANEOUS at 15:10

## 2022-01-30 RX ADMIN — ACETAMINOPHEN 650 MG: 325 TABLET, FILM COATED ORAL at 08:05

## 2022-01-30 RX ADMIN — LEVOTHYROXINE SODIUM 112 MCG: 112 TABLET ORAL at 06:27

## 2022-01-30 RX ADMIN — HYDROXYUREA 500 MG: 500 CAPSULE ORAL at 10:52

## 2022-01-30 RX ADMIN — METOPROLOL TARTRATE 25 MG: 25 TABLET, FILM COATED ORAL at 21:20

## 2022-01-30 RX ADMIN — ASPIRIN 81 MG CHEWABLE TABLET 81 MG: 81 TABLET CHEWABLE at 08:06

## 2022-01-30 RX ADMIN — LISINOPRIL 20 MG: 20 TABLET ORAL at 08:09

## 2022-01-30 RX ADMIN — AMLODIPINE BESYLATE 10 MG: 10 TABLET ORAL at 08:06

## 2022-01-30 RX ADMIN — METOPROLOL TARTRATE 25 MG: 25 TABLET, FILM COATED ORAL at 08:10

## 2022-01-30 RX ADMIN — ATORVASTATIN CALCIUM 40 MG: 40 TABLET, FILM COATED ORAL at 17:01

## 2022-01-30 RX ADMIN — TICAGRELOR 90 MG: 90 TABLET ORAL at 08:06

## 2022-01-30 NOTE — PROGRESS NOTES
Cardiology Progress Note - Woodrow Craig 78 y o  female MRN: [id number]    Unit/Bed#: [facility] 521-01 Encounter: [id number]      Assessment:  Principal Problem:    STEMI involving right coronary artery [facility])      Plan:  Patient with no chest pain or significant dyspnea  She had no issues overnight except that she does have right groin discomfort  Examination of groin demonstrates hematoma no obvious expansion  Will need to exclude pseudoaneurysm  Sinus rhythm on telemetry  Patient continues on dual anti-platelet therapy  Hopefully ready for discharge planning tomorrow pending result of right groin vascular study  Subjective:   Patient seen and examined  No significant events overnight   negative  Objective:     Vitals: Blood pressure 123/71, pulse 93, temperature 98 4 °F (36 9 °C), resp  rate 20, height 5' 3" (1 6 m), weight 76 7 kg (169 lb), SpO2 99 %  , Body mass index is 29 94 kg/m² ,   Orthostatic Blood Pressures      Most Recent Value   Blood Pressure 123/71 filed at 01/30/2022 0801   Patient Position - Orthostatic VS Lying filed at 01/29/2022 0930      ,      Intake/Output Summary (Last 24 hours) at 1/30/2022 1025  Last data filed at 1/30/2022 6268  Gross per 24 hour   Intake 358 ml   Output --   Net 358 ml       No significant arrhythmias seen on telemetry review  Physical Exam:    GEN: Woodrow Craig appears well, alert and oriented x 3, pleasant and cooperative   NECK: supple, no carotid bruits, no JVD or HJR  HEART: normal rate, regular rhythm, normal S1 and S2, no murmurs, clicks, gallops or rubs   LUNGS: clear to auscultation bilaterally; no wheezes, rales, or rhonchi   ABDOMEN: normal bowel sounds, soft, no tenderness, no distention  EXTREMITIES: peripheral pulses normal; no clubbing, cyanosis, or edema  Right groin with evidence of hematoma  Tenderness at puncture site  Cannot exclude faint bruit    SKIN: warm and well perfused, no suspicious lesions on exposed skin    Labs & Results:    Admission on 01/28/2022   Component Date Value    Sodium 01/28/2022 137     Potassium 01/28/2022 3 6     Chloride 01/28/2022 104     CO2 01/28/2022 23     ANION GAP 01/28/2022 10     BUN 01/28/2022 19     Creatinine 01/28/2022 1 07     Glucose 01/28/2022 174*    Calcium 01/28/2022 11 0*    AST 01/28/2022 20     ALT 01/28/2022 27     Alkaline Phosphatase 01/28/2022 98     Total Protein 01/28/2022 8 1     Albumin 01/28/2022 3 8     Total Bilirubin 01/28/2022 0 33     eGFR 01/28/2022 51     hs TnI 0hr 01/28/2022 86*    PTT 01/28/2022 18*    Protime 01/28/2022 12 2     INR 01/28/2022 0 94     Ventricular Rate 01/28/2022 70     Atrial Rate 01/28/2022 70     MS Interval 01/28/2022 218     QRSD Interval 01/28/2022 70     QT Interval 01/28/2022 386     QTC Interval 01/28/2022 416     P Axis 01/28/2022 55     QRS Axis 01/28/2022 31     T Wave Axis 01/28/2022 96     hs TnI 2hr 01/28/2022 1,308*    Delta 2hr hsTnI 01/28/2022 1,222*    Platelets 82/45/0406 425*    MPV 01/28/2022 9 1     A4C EF 01/28/2022 52     LV Diastolic Volume (BP) 82/66/8220 76     LV Systolic Volume (BP) 36/99/3617 37     EF 01/28/2022 52     LVIDd 01/28/2022 4 00     LVIDS 01/28/2022 2 90     IVSd 01/28/2022 1 20     LVPWd 01/28/2022 1 20     FS 01/28/2022 28     MV E' Tissue Velocity Se* 01/28/2022 5     E wave deceleration time 01/28/2022 248     MV Peak E Joby 01/28/2022 56     MV Peak A Joby 01/28/2022 1 1     RVID d 01/28/2022 3 0     LA size 01/28/2022 3 6     MARGUERITE A4C 01/28/2022 18 3     RAA A4C 01/28/2022 12 4     MV stenosis pressure 1/2* 01/28/2022 0     Ao root 01/28/2022 2 90     Asc Ao 01/28/2022 3 1     Left ventricular stroke * 01/28/2022 37 00     LEFT VENTRICLE SYSTOLIC * 07/55/9230 33     LV DIASTOLIC VOLUME (MOD* 51/46/9112 70     Ao asc z-score 01/28/2022 2 65     ZLVPWD 01/28/2022 4 05     ZLVIDS 01/28/2022 -2 65     ZIVSD 01/28/2022 3 94     LV WELLS VOL ENDO Z SCORE 01/28/2022 -2 37     LV EF 01/28/2022 55     hs TnI 4hr 01/28/2022 3,319*    Delta 4hr hsTnI 01/28/2022 3,233*    Sodium 01/29/2022 138     Potassium 01/29/2022 3 7     Chloride 01/29/2022 109*    CO2 01/29/2022 23     ANION GAP 01/29/2022 6     BUN 01/29/2022 21     Creatinine 01/29/2022 0 78     Glucose 01/29/2022 128     Calcium 01/29/2022 9 0     Corrected Calcium 01/29/2022 10 1     AST 01/29/2022 46*    ALT 01/29/2022 26     Alkaline Phosphatase 01/29/2022 59     Total Protein 01/29/2022 5 3*    Albumin 01/29/2022 2 6*    Total Bilirubin 01/29/2022 0 71     eGFR 01/29/2022 75     Magnesium 01/29/2022 2 0     WBC 01/29/2022 13 69*    RBC 01/29/2022 2 83*    Hemoglobin 01/29/2022 9 4*    Hematocrit 01/29/2022 28 3*    MCV 01/29/2022 100*    MCH 01/29/2022 33 2     MCHC 01/29/2022 33 2     RDW 01/29/2022 13 7     MPV 01/29/2022 9 1     Platelets 56/63/2708 378     nRBC 01/29/2022 0     Neutrophils Relative 01/29/2022 78*    Immat GRANS % 01/29/2022 1     Lymphocytes Relative 01/29/2022 12*    Monocytes Relative 01/29/2022 9     Eosinophils Relative 01/29/2022 0     Basophils Relative 01/29/2022 0     Neutrophils Absolute 01/29/2022 10 67*    Immature Grans Absolute 01/29/2022 0 08     Lymphocytes Absolute 01/29/2022 1 63     Monocytes Absolute 01/29/2022 1 25*    Eosinophils Absolute 01/29/2022 0 01     Basophils Absolute 01/29/2022 0 05     Ventricular Rate 01/28/2022 85     Atrial Rate 01/28/2022 85     WI Interval 01/28/2022 170     QRSD Interval 01/28/2022 68     QT Interval 01/28/2022 418     QTC Interval 01/28/2022 497     P Axis 01/28/2022 55     QRS Axis 01/28/2022 -12     T Wave Axis 01/28/2022 -35        Cardiac catheterization    Result Date: 1/28/2022  Narrative: · Mid RCA lesion is 100% stenosed  · The angioplasty was pre-stent angioplasty    1v CAD     Echo complete w/ contrast if indicated    Result Date: 1/29/2022  Narrative: Terrance Jay Left Ventricle: Left ventricular cavity size is normal  The left ventricular ejection fraction is 50-55%  Systolic function is low normal  Diastolic function is normal  Moderate infero-basal hypokinesis There is mild concentric hypertrophy    Mitral Valve: There is mild annular calcification  There is trace regurgitation    Tricuspid Valve: There is trace regurgitation  EKG personally reviewed by Andreia Lugo MD      Counseling / Coordination of Care  Total floor / unit time spent today 30 minutes  Greater than 50% of total time was spent with the patient and / or family counseling and / or coordination of care

## 2022-01-31 VITALS
SYSTOLIC BLOOD PRESSURE: 115 MMHG | BODY MASS INDEX: 29.95 KG/M2 | RESPIRATION RATE: 18 BRPM | DIASTOLIC BLOOD PRESSURE: 69 MMHG | TEMPERATURE: 98.2 F | HEART RATE: 77 BPM | HEIGHT: 63 IN | OXYGEN SATURATION: 100 % | WEIGHT: 169 LBS

## 2022-01-31 LAB
ANION GAP SERPL CALCULATED.3IONS-SCNC: 4 MMOL/L (ref 4–13)
BASOPHILS # BLD AUTO: 0.04 THOUSANDS/ΜL (ref 0–0.1)
BASOPHILS NFR BLD AUTO: 1 % (ref 0–1)
BUN SERPL-MCNC: 11 MG/DL (ref 5–25)
CALCIUM SERPL-MCNC: 9 MG/DL (ref 8.3–10.1)
CHLORIDE SERPL-SCNC: 114 MMOL/L (ref 100–108)
CO2 SERPL-SCNC: 24 MMOL/L (ref 21–32)
CREAT SERPL-MCNC: 0.63 MG/DL (ref 0.6–1.3)
EOSINOPHIL # BLD AUTO: 0.09 THOUSAND/ΜL (ref 0–0.61)
EOSINOPHIL NFR BLD AUTO: 1 % (ref 0–6)
ERYTHROCYTE [DISTWIDTH] IN BLOOD BY AUTOMATED COUNT: 13.7 % (ref 11.6–15.1)
GFR SERPL CREATININE-BSD FRML MDRD: 88 ML/MIN/1.73SQ M
GLUCOSE SERPL-MCNC: 89 MG/DL (ref 65–140)
HCT VFR BLD AUTO: 27.8 % (ref 34.8–46.1)
HGB BLD-MCNC: 9 G/DL (ref 11.5–15.4)
IMM GRANULOCYTES # BLD AUTO: 0.05 THOUSAND/UL (ref 0–0.2)
IMM GRANULOCYTES NFR BLD AUTO: 1 % (ref 0–2)
KCT BLD-ACNC: 232 SEC (ref 89–137)
LYMPHOCYTES # BLD AUTO: 1.25 THOUSANDS/ΜL (ref 0.6–4.47)
LYMPHOCYTES NFR BLD AUTO: 15 % (ref 14–44)
MCH RBC QN AUTO: 32.8 PG (ref 26.8–34.3)
MCHC RBC AUTO-ENTMCNC: 32.4 G/DL (ref 31.4–37.4)
MCV RBC AUTO: 102 FL (ref 82–98)
MONOCYTES # BLD AUTO: 0.63 THOUSAND/ΜL (ref 0.17–1.22)
MONOCYTES NFR BLD AUTO: 8 % (ref 4–12)
NEUTROPHILS # BLD AUTO: 6.36 THOUSANDS/ΜL (ref 1.85–7.62)
NEUTS SEG NFR BLD AUTO: 74 % (ref 43–75)
NRBC BLD AUTO-RTO: 0 /100 WBCS
PLATELET # BLD AUTO: 415 THOUSANDS/UL (ref 149–390)
PMV BLD AUTO: 9.3 FL (ref 8.9–12.7)
POTASSIUM SERPL-SCNC: 3.5 MMOL/L (ref 3.5–5.3)
RBC # BLD AUTO: 2.74 MILLION/UL (ref 3.81–5.12)
SODIUM SERPL-SCNC: 142 MMOL/L (ref 136–145)
SPECIMEN SOURCE: ABNORMAL
WBC # BLD AUTO: 8.42 THOUSAND/UL (ref 4.31–10.16)

## 2022-01-31 PROCEDURE — NC001 PR NO CHARGE: Performed by: INTERNAL MEDICINE

## 2022-01-31 PROCEDURE — 80048 BASIC METABOLIC PNL TOTAL CA: CPT | Performed by: INTERNAL MEDICINE

## 2022-01-31 PROCEDURE — 99238 HOSP IP/OBS DSCHRG MGMT 30/<: CPT | Performed by: INTERNAL MEDICINE

## 2022-01-31 PROCEDURE — 85025 COMPLETE CBC W/AUTO DIFF WBC: CPT | Performed by: INTERNAL MEDICINE

## 2022-01-31 RX ORDER — CLOPIDOGREL BISULFATE 75 MG/1
75 TABLET ORAL DAILY
Qty: 90 TABLET | Refills: 0 | Status: SHIPPED | OUTPATIENT
Start: 2022-01-31 | End: 2022-04-25 | Stop reason: SDUPTHER

## 2022-01-31 RX ORDER — CLOPIDOGREL BISULFATE 75 MG/1
600 TABLET ORAL ONCE
Status: COMPLETED | OUTPATIENT
Start: 2022-01-31 | End: 2022-01-31

## 2022-01-31 RX ORDER — CLOPIDOGREL BISULFATE 75 MG/1
75 TABLET ORAL DAILY
Status: DISCONTINUED | OUTPATIENT
Start: 2022-02-01 | End: 2022-01-31 | Stop reason: HOSPADM

## 2022-01-31 RX ORDER — NITROGLYCERIN 0.4 MG/1
0.4 TABLET SUBLINGUAL
Qty: 90 TABLET | Refills: 0 | Status: SHIPPED | OUTPATIENT
Start: 2022-01-31

## 2022-01-31 RX ORDER — CLOPIDOGREL BISULFATE 75 MG/1
600 TABLET ORAL ONCE
Status: DISCONTINUED | OUTPATIENT
Start: 2022-01-31 | End: 2022-01-31

## 2022-01-31 RX ORDER — ATORVASTATIN CALCIUM 40 MG/1
40 TABLET, FILM COATED ORAL EVERY EVENING
Qty: 90 TABLET | Refills: 1 | Status: SHIPPED | OUTPATIENT
Start: 2022-01-31 | End: 2022-07-25 | Stop reason: SDUPTHER

## 2022-01-31 RX ORDER — FERROUS SULFATE TAB EC 324 MG (65 MG FE EQUIVALENT) 324 (65 FE) MG
324 TABLET DELAYED RESPONSE ORAL EVERY OTHER DAY
Qty: 60 TABLET | Refills: 0 | Status: SHIPPED | OUTPATIENT
Start: 2022-01-31

## 2022-01-31 RX ADMIN — METOPROLOL TARTRATE 25 MG: 25 TABLET, FILM COATED ORAL at 08:54

## 2022-01-31 RX ADMIN — LISINOPRIL 20 MG: 20 TABLET ORAL at 08:54

## 2022-01-31 RX ADMIN — ACETAMINOPHEN 650 MG: 325 TABLET, FILM COATED ORAL at 08:48

## 2022-01-31 RX ADMIN — ACETAMINOPHEN 650 MG: 325 TABLET, FILM COATED ORAL at 00:54

## 2022-01-31 RX ADMIN — CLOPIDOGREL BISULFATE 600 MG: 75 TABLET ORAL at 17:28

## 2022-01-31 RX ADMIN — HEPARIN SODIUM 5000 UNITS: 5000 INJECTION INTRAVENOUS; SUBCUTANEOUS at 15:10

## 2022-01-31 RX ADMIN — ASPIRIN 81 MG CHEWABLE TABLET 81 MG: 81 TABLET CHEWABLE at 08:48

## 2022-01-31 RX ADMIN — ATORVASTATIN CALCIUM 40 MG: 40 TABLET, FILM COATED ORAL at 17:28

## 2022-01-31 RX ADMIN — LEVOTHYROXINE SODIUM 112 MCG: 112 TABLET ORAL at 05:21

## 2022-01-31 RX ADMIN — TICAGRELOR 90 MG: 90 TABLET ORAL at 08:53

## 2022-01-31 RX ADMIN — AMLODIPINE BESYLATE 10 MG: 10 TABLET ORAL at 08:53

## 2022-01-31 NOTE — CASE MANAGEMENT
Case Management Assessment & Discharge Planning Note    Patient name Danny Lewis  Location [facility] 521/[facility] 570-76 MRN [id number]  : 1945 Date 2022       Current Admission Date: 2022  Current Admission Diagnosis:STEMI involving right coronary artery [facility])   Patient Active Problem List    Diagnosis Date Noted    STEMI involving right coronary artery ([facility] ) 2022    Obesity (BMI 30 0-34 9) 2019    Myeloproliferative disorder ([facility] ) 2018    Fatty liver 10/06/2017    Essential thrombocythemia ([facility] ) 2017    Prediabetes 11/10/2016    Hypokalemia 2016    Impaired fasting glucose 2013    Insomnia 2013    Hypothyroidism 2013    Osteopenia 2012    Benign essential hypertension 2012      LOS (days): 3  Geometric Mean LOS (GMLOS) (days): 2 10  Days to GMLOS:-0 8     OBJECTIVE:    Risk of Unplanned Readmission Score: 12         Current admission status: Inpatient       Preferred Pharmacy:   [facility] [address]UCERO [address]OSTON [address]  [address] [place] [address]  Phone: [phone] Fax: [phone]    Primary Care Provider: Tim Garcia DO    Primary Insurance: [facility]  Secondary Insurance:     ASSESSMENT:  [address] Agents    There are no active Health Care Agents on file         Advance Directives  Does patient have a [address] [place] [address] Avenue?: Yes  Does patient have Advance Directives?: Yes  Advance Directives: Power of  for health care,Power of  for finance              Patient Information  Admitted from[de-identified] Home  Mental Status: Alert  During Assessment patient was accompanied by: Not accompanied during assessment  Assessment information provided by[de-identified] Patient  Primary Caregiver: Self  Support Systems: Family members,Self  Type of Current Residence: [facility] (with basement)  In the last 12 months, was there a time when you were not able to pay the mortgage or rent on time?: No  In the last 12 months, was there a time when you did not have a steady place to sleep or slept in a shelter (including now)?: No  Homeless/housing insecurity resource given?: N/A  Living Arrangements: Lives Alone    Activities of Daily Living Prior to Admission  Functional Status: Independent  Completes ADLs independently?: Yes  Ambulates independently?: Yes  Does patient use assisted devices?: No  Does patient currently own DME?: No  Does the patient have a history of Short-Term Rehab?: No  Does patient have a history of HHC?: No  Does patient currently have [place] [id number]?: No         Patient Information Continued  Income Source: Pension/prison  Within the past 12 months, you worried that your food would run out before you got the money to buy more : Never true  Within the past 12 months, the food you bought just didnt last and you didnt have money to get more : Never true  Food insecurity resource given?: N/A  Does patient have a history of substance abuse?: No  Does patient have a history of Mental Health Diagnosis?: No         Means of Transportation  Means of Transport to Appts[de-identified] Drives Self  In the past 12 months, has lack of transportation kept you from medical appointments or from getting medications?: No  In the past 12 months, has lack of transportation kept you from meetings, work, or from getting things needed for daily living?: No  Was application for public transport provided?: N/A        DISCHARGE DETAILS:    Discharge planning discussed with[de-identified] pt  Freedom of Choice: Yes      Would you like to participate in our [facility] [address] service program?  : No - Declined

## 2022-01-31 NOTE — NURSING NOTE
Discharge reviewed with patient  Patient aware and agreeable  Scripts sent to [facility]  Patient discharged home

## 2022-01-31 NOTE — DISCHARGE INSTRUCTIONS
Heart Attack   WHAT YOU NEED TO KNOW:   A heart attack happens when the blood vessels that supply blood to your heart are blocked  This can damage your heart or lead to an abnormal heart rhythm or heart failure  A heart attack is also called a myocardial infarction  DISCHARGE INSTRUCTIONS:   Call your local emergency number (911 in the [facility] [facility]) for any of the following:   · You have any of the following signs of a heart attack:      ? Squeezing, pressure, or pain in your chest    ? You may  also have any of the following:     § Discomfort or pain in your back, neck, jaw, stomach, or arm    § Shortness of breath    § Nausea or vomiting    § Lightheadedness or a sudden cold sweat      Seek care immediately if:   · You are tired and cannot think clearly  · Your heart is beating faster than usual     · You are bleeding from your gums or nose  · You see blood in your urine or bowel movements  · You urinate less than usual or not at all  · You have new or increased swelling in your feet or ankles  Call your doctor or cardiologist if:   · You have trouble taking your heart medicine  · You have questions or concerns about your condition or care  Medicines: You may  need any of the following:  · Heart medicines  help decrease blood pressure, control your heart rate, and help your heart function better  · Nitroglycerin  opens the arteries to your heart, increases oxygen levels, and can decrease chest pain  You may get your nitroglycerin as a pill, a patch, or a paste  Ask your healthcare provider or cardiologist how to safely take this medicine  · Aspirin  helps prevent clots from forming and causing blood flow problems  If healthcare providers want you to take aspirin daily, do not take acetaminophen or ibuprofen instead  Do not take more or less aspirin than healthcare providers say to take   If you are on another blood thinner medicine, ask your healthcare provider or cardiologist before you take aspirin for any reason  · Antiplatelets , such as aspirin, help prevent blood clots  Take your antiplatelet medicine exactly as directed  These medicines make it more likely for you to bleed or bruise  If you are told to take aspirin, do not take acetaminophen or ibuprofen instead  · Blood thinners  help prevent blood clots  Clots can cause strokes, heart attacks, and death  The following are general safety guidelines to follow while you are taking a blood thinner:    ? Watch for bleeding and bruising while you take blood thinners  Watch for bleeding from your gums or nose  Watch for blood in your urine and bowel movements  Use a soft washcloth on your skin, and a soft toothbrush to brush your teeth  This can keep your skin and gums from bleeding  If you shave, use an electric shaver  Do not play contact sports  ? Tell your dentist and other healthcare providers that you take a blood thinner  Wear a bracelet or necklace that says you take this medicine  ? Do not start or stop any other medicines unless your healthcare provider tells you to  Many medicines cannot be used with blood thinners  ? Take your blood thinner exactly as prescribed by your healthcare provider  Do not skip does or take less than prescribed  Tell your provider right away if you forget to take your blood thinner, or if you take too much  ? Warfarin  is a blood thinner that you may need to take  The following are things you should be aware of if you take warfarin:     § Foods and medicines can affect the amount of warfarin in your blood  Do not make major changes to your diet while you take warfarin  Warfarin works best when you eat about the same amount of vitamin K every day  Vitamin K is found in green leafy vegetables and certain other foods  Ask for more information about what to eat when you are taking warfarin  § You will need to see your healthcare provider for follow-up visits when you are on warfarin   You will need regular blood tests  These tests are used to decide how much medicine you need  · Cholesterol medicine  decreases cholesterol and the amount of plaque in your blood  · Do not take certain medicines without asking your healthcare provider first   These include NSAIDs, herbal or vitamin supplements, or hormones (estrogen or progestin)  · Take your medicine as directed  Contact your healthcare provider if you think your medicine is not helping or if you have side effects  Tell him or her if you are allergic to any medicine  Keep a list of the medicines, vitamins, and herbs you take  Include the amounts, and when and why you take them  Bring the list or the pill bottles to follow-up visits  Carry your medicine list with you in case of an emergency  Cardiac rehabilitation (rehab)  is a program run by specialists who will help you safely strengthen your heart and prevent more heart disease  The plan includes exercise, relaxation, stress management, and heart-healthy nutrition  Healthcare providers will also check to make sure any medicines you take are working  The plan may also include instructions for when you can drive, return to work, and do other normal daily activities  Manage other health conditions:  Diabetes and high cholesterol increases your risk for another heart attack and stroke  Talk to your healthcare provider about your management plan  He or she will make a plan that helps you manage your conditions  Check your blood pressure at home:  High blood pressure can increase your risk for another heart attack  Sit and rest for 5 minutes before you take your blood pressure  Extend your arm and support it on a flat surface  Your arm should be at the same level as your heart  Follow the directions that came with your monitor  If possible, take at least 2 readings each time  Take your blood pressure at least 2 times each day at the same times, such as mornings and evenings   Keep a record of your readings and bring it to your follow-up visits  Ask your healthcare provider what your blood pressure should be  Get a flu vaccine every year as soon as it is available: The vaccine will help prevent the flu  A heart attack will make it harder for you to fight off the flu virus on your own  The flu may also be worse for you than for a person who has not had a heart attack  Ask about other vaccinations you may need  Lifestyle changes you may need to make after a heart attack:   · Follow a heart-healthy diet  A heart-healthy diet is an eating plan low in total fat, unhealthy fats, and sodium (salt)  A heart-healthy diet helps decrease your risk for heart disease and stroke  Limit the amount of fat you eat to 25% to 35% of your total daily calories  Your healthcare provider may recommend the DASH (Dietary Approaches to Stop Hypertension) Eating Plan to help lower high blood pressure and LDL (bad) cholesterol  The plan is low in sodium, sugar, unhealthy fats, and total fat  It is high in potassium, calcium, magnesium, and fiber  Ask for more information about this plan  · Limit sodium (salt) as directed  Too much sodium can affect your fluid balance  Check labels to find low-sodium or no-salt-added foods  Some low-sodium foods use potassium salts for flavor  Too much potassium can also cause health problems  Your healthcare provider will tell you how much sodium and potassium are safe for you to have in a day  He or she may recommend that you limit sodium to 2,300 mg a day  · Do not smoke  Nicotine and other chemicals in cigarettes and cigars can cause lung and heart damage  Ask your healthcare provider for information if you currently smoke and need help to quit  E-cigarettes or smokeless tobacco still contain nicotine  Talk to your healthcare provider before you use these products  · Exercise as directed  Ask your healthcare provider about the best exercise plan for you  Exercise makes your heart stronger, lowers blood pressure, and helps prevent a heart attack  The goal is 30 to 60 minutes a day, 5 to 7 days a week  You may have to work up to this goal  Healthcare providers can help you reach this goal, starting in cardiac rehab sessions  · Maintain a healthy weight  Ask your healthcare provider how much you should weigh  He or she can help you create a safe weight loss plan if you are overweight  · Manage stress  Stress may increase your risk for another heart attack  Learn ways to control stress, such as relaxation, deep breathing, and music  Talk to someone about things that upset you  Follow up with your healthcare provider or cardiologist within 14 days or as directed:  Write down your questions so you remember to ask them during your visits  © Copyright [employer] 2021 Information is for End User's use only and may not be sold, redistributed or otherwise used for commercial purposes  All illustrations and images included in CareNotes® are the copyrighted property of A D A M , Inc  or [address] [place], [place] [address]irene   The above information is an  only  It is not intended as medical advice for individual conditions or treatments  Talk to your doctor, nurse or pharmacist before following any medical regimen to see if it is safe and effective for you

## 2022-01-31 NOTE — DISCHARGE SUMMARY
INTERNAL MEDICINE RESIDENCY DISCHARGE SUMMARY  Jose Alejandro Wray   78 y o  female  MRN: [id number]  Room/Bed: [place] 022181/[facility] 521-01     [address] [place] [address] MED SURG/SD   Encounter: [id number]    Principal Problem:    STEMI involving right coronary artery [facility])      No new Assessment & Plan notes have been filed under this hospital service since the last note was generated  Service: Cardiology      DISCHARGE INFORMATION       Admitting Provider: Murali Pace MD  Discharge Provider: Murali Pace MD    Admission Date: 1/28/2022     Discharge Date: No discharge date for patient encounter  Primary Discharge Diagnosis  Principal Problem:    STEMI involving right coronary artery [facility])  Resolved Problems:    * No resolved hospital problems  *      Diagnostic & Therapeutic Procedures Performed:  No results found          Discharge Disposition: Home/Self Care  Code Status: Level 1 - Full Code  Advance Directive & Living Will: <no information>  Power of :    POLST:      Medications   Current Discharge Medication List      STOP taking these medications       hydrochlorothiazide (MICROZIDE) 12 5 mg capsule Comments:   Reason for Stopping:         Klor-Con M20 20 MEQ tablet Comments:   Reason for Stopping:         Multiple Vitamin (MULTIVITAMIN) capsule Comments:   Reason for Stopping:         prednisoLONE acetate (PRED FORTE) 1 % ophthalmic suspension Comments:   Reason for Stopping:             Current Discharge Medication List      START taking these medications    Details   atorvastatin (LIPITOR) 40 mg tablet Take 1 tablet (40 mg total) by mouth every evening  Qty: 90 tablet, Refills: 1    Associated Diagnoses: STEMI (ST elevation myocardial infarction) (HCC)      clopidogrel (Plavix) 75 mg tablet Take 1 tablet (75 mg total) by mouth daily  Qty: 90 tablet, Refills: 0    Associated Diagnoses: STEMI involving right coronary artery (HCC)      metoprolol tartrate (LOPRESSOR) 25 mg tablet Take 1 tablet (25 mg total) by mouth every 12 (twelve) hours  Qty: 90 tablet, Refills: 0    Associated Diagnoses: STEMI (ST elevation myocardial infarction) ([facility])      nitroglycerin (NITROSTAT) 0 4 mg SL tablet Place 1 tablet (0 4 mg total) under the tongue every 5 (five) minutes as needed for chest pain  Qty: 90 tablet, Refills: 0    Associated Diagnoses: STEMI (ST elevation myocardial infarction) ([facility] [address] )           Current Discharge Medication List      CONTINUE these medications which have NOT CHANGED    Details   amLODIPine (NORVASC) 5 mg tablet Take 2 tablets (10 mg total) by mouth daily  Qty: 180 tablet, Refills: 1    Comments: New dose as of 2/18/19  Associated Diagnoses: Benign essential hypertension; Benign hypertension      aspirin 81 MG tablet Take 81 mg by mouth daily  hydroxyurea (HYDREA) 500 mg capsule TAKE 1 CAPSULE BY MOUTH EVERY OTHER DAY  Qty: 30 capsule, Refills: 11    Associated Diagnoses: Essential thrombocythemia (HCC)      levothyroxine 112 mcg tablet Take 1 tablet (112 mcg total) by mouth daily  Qty: 90 tablet, Refills: 1    Associated Diagnoses: Acquired hypothyroidism      lisinopril (ZESTRIL) 20 mg tablet Take 1 tablet (20 mg total) by mouth daily  Qty: 90 tablet, Refills: 1    Associated Diagnoses: Benign essential hypertension             Allergies  No Known [address] Hospital Course  Patient presented on 1/28/2022 secondary to interolateral ST elevations on EKG  The patient was urgently transferred to the cath lab and had PCI to RCA  No other significant CAD  The patient otherwise was hemodynamically stable  She had an echocardiogram that revealed a normal ejection fraction  She had no significant valvular abnormalities  She had a hematoma that measured 15 cm  She had no parathesias or motor def secondary to this  Her hemoglobin stablized and she had no other evidence of bleeding  She had no back bruising   She had no events on telemetry  She was walking around without any difficulties  She had a u/s performed that showed no evidence of psuedoaneurysm  She was unable to afford brilinta so she was re loaded on plavix 600 mg and sent home on DAPT with ASA and plavix  She was started on metooprolol and continued on lisinopril  Her BP was slightly low so her HCTZ was discontinued  She will follow up with cardiology in the [place] office and the appointment was set up for her  On the day of discharge the patient was hemodynmically stable  All evidence of bleeding had stopped  She was sent home on oral iron supplementation  She was discharged in stable condition  Presenting Problem/History of Present Illness  Principal Problem:    STEMI involving right coronary artery ([address] )  Resolved Problems:    * No resolved hospital problems  *        Other Pertinent Test Results       Discharge Condition: stable      Discharge  Statement   I spent 30 minutes minutes discharging the patient  This time was spent on the day of discharge  I had direct contact with the patient on the day of discharge  Additional documentation is required if more than 30 minutes were spent on discharge

## 2022-01-31 NOTE — PROGRESS NOTES
Cardiology Progress note  Unit/Bed#: [facility] 521-01 Encounter: [id number]        Woodrow Lighter 78 y o  female [id number]  Hospital Stay Days: 3    Assessment and Plan      Current Problem List   Principal Problem:    STEMI involving right coronary artery (HCC)    Assessment/Plan:    1  ST elevation myocardial infarction - S/P HANH to RCA  Patient with post op hematoma  Does have hemaglobin drop  No other evidence of bleeding elsewhere  Pulses intact  No motor or sensory def  Does have mild pain on rest    · U/S shows no pseudoaneurysm  · No evidence of expanding hematoma or continual bleeding  · Cont  To monitor on tele  · Cont  Atorvastatin 40 mg    · Lisinopril 20 mg    · Cont  Metoprolol 25 mg    · ASA 81 mg    · Cont  Ticagrelor 90 mg bid  · Echo shows preserved EF  2   HTN  ·  Cont  Norvasc and lisinopril  3  Hematoma   ·  No evidence of expanding  Cont  Ice packs to area  Tylenol for pain  · Cont dapt  4  Essential thrombocythemia   ·  Cont  Hydroxyurea  5  ABLA  ·  Hemoglobin 9 today - cont  To trend and monitor - first CBC was collected after cath lab  ·     Subjective     Patient seen and examined  Patient presented on 2022 secondary to interolateral ST elevations on EKG  Patient S/P PCI to RCA  No other significant CAD  Patient with post op pain in the right groin  Small hematoma in right groin site  No evidence of pseudoaneurysm  Patient is not yet walking  Echo showed EF of 50-55 with inferobasal hypokinesis     Objective     Vitals: Temp (24hrs), Av 1 °F (36 7 °C), Min:97 9 °F (36 6 °C), Max:98 4 °F (36 9 °C)  Current: Temperature: 97 9 °F (36 6 °C)  Patient Vitals for the past 24 hrs:   BP Temp Temp src Pulse SpO2   22 0657 116/71 97 9 °F (36 6 °C) -- -- 99 %   22 0301 110/59 98 °F (36 7 °C) -- -- 100 %   22 2249 108/59 98 4 °F (36 9 °C) Oral 79 97 %   220 111/58 -- -- 82 --   22 111/58 -- -- -- --   22 1810 110/55 98 1 °F (36 7 °C) Oral 89 96 %   01/30/22 1810 110/55 98 1 °F (36 7 °C) -- -- --   01/30/22 1500 98/52 98 °F (36 7 °C) Oral 82 93 %   01/30/22 1452 98/52 -- -- -- --    Body mass index is 29 94 kg/m²  Physical Exam:  GENERAL: NAD  HEENT:  NC/AT  CARDIAC:  RRR, +S1/S2, no S3/S4 heard, no m/g/r  PULMONARY:  CTA B/L, no wheezing/rales/rhonci, non-labored breathing  ABDOMEN:  Soft, NT/ND,no rebound/guarding/rigidity  Extremities:  Bruising and small hematoma noted on right lower extremity  NEUROLOGIC: Grossly intact  SKIN:  No rashes or erythema noted on exposed skin  Psych: Normal affect    Invasive Devices  Report    Peripheral Intravenous Line            Peripheral IV 01/30/22 Dorsal (posterior); Left Forearm <1 day                    Labs:   Results from last 7 days   Lab Units 01/31/22  0847 01/29/22  0951 01/29/22  0951 01/28/22  1512   WBC Thousand/uL 8 42  --  13 69*  --    HEMOGLOBIN g/dL 9 0*  --  9 4*  --    HEMATOCRIT % 27 8*  --  28 3*  --    PLATELETS Thousands/uL 415*  --  378 425*   NEUTROS PCT % 74   < > 78*  --    MONOS PCT % 8  --  9  --     < > = values in this interval not displayed        Results from last 7 days   Lab Units 01/31/22  0846 01/29/22  0428 01/28/22  1302   SODIUM mmol/L 142 138 137   POTASSIUM mmol/L 3 5 3 7 3 6   CHLORIDE mmol/L 114* 109* 104   CO2 mmol/L 24 23 23   BUN mg/dL 11 21 19   CREATININE mg/dL 0 63 0 78 1 07   CALCIUM mg/dL 9 0 9 0 11 0*   ALK PHOS U/L  --  59 98   ALT U/L  --  26 27   AST U/L  --  46* 20   MAGNESIUM mg/dL  --  2 0  --    INR   --   --  0 94   PTT seconds  --   --  18*   EGFR ml/min/1 73sq m 88 75 51     Results from last 7 days   Lab Units 01/28/22  1302   INR  0 94   PTT seconds 18*             No results found for: PHART, IDE9RAG, PO2ART, ZWR5TJM, Q6KYBDXB, BEART, SOURCE  No components found for: HIV1X2  Lab Results   Component Value Date    HEPCAB Non-reactive 10/14/2018     No results found for: SPEP, UPEP   Lab Results   Component Value Date HGBA1C 5 7 04/10/2018    HGBA1C 5 7 11/07/2016    HGBA1C 5 7 (H) 11/22/2013     Lab Results   Component Value Date    CHOL 193 03/15/2015      Lab Results   Component Value Date    HDL 42 10/17/2021    HDL 41 07/18/2020    HDL 41 06/19/2019      Lab Results   Component Value Date    LDLCALC 118 (H) 10/17/2021    LDLCALC 122 (H) 07/18/2020    LDLCALC 116 (H) 06/19/2019      Lab Results   Component Value Date    TRIG 128 10/17/2021    TRIG 95 07/18/2020    TRIG 111 06/19/2019     No components found for: PROCAL      Micro:      Urinalysis:  No results found for: AMPHETUR, BDZUR, COCAINEUR, OPIATEUR, PCPUR, THCUR, ETOH, ACTMNPHEN, SALICYLATE       Invalid input(s): URIBILINOGEN        Intake and Outputs:  I/O       01/29 0701 01/30 0700 01/30 0701  01/31 0700 01/31 0701 02/01 0700    P  O  480 118 120    I V  (mL/kg) 580 (7 6)      Total Intake(mL/kg) 1060 (13 8) 118 (1 5) 120 (1 6)    Urine (mL/kg/hr) 150 (0 1)  200 (0 7)    Stool 0      Total Output 150  200    Net +910 +118 -80           Unmeasured Urine Occurrence  3 x     Unmeasured Stool Occurrence 0 x          Nutrition:  Diet Cardiac  Radiology Results:   VAS pseudoaneurysm study    (Results Pending)     Scheduled Medications:  amLODIPine, 10 mg, Daily  aspirin, 81 mg, Daily  atorvastatin, 40 mg, QPM  heparin (porcine), 5,000 Units, Q8H NALINI  hydroxyurea, 500 mg, Q48H  levothyroxine, 112 mcg, Early Morning  lisinopril, 20 mg, Daily  metoprolol tartrate, 25 mg, Q12H NALINI  ticagrelor, 90 mg, Q12H [address]      PRN MEDS:  acetaminophen, 650 mg, Q4H PRN  nitroglycerin, 0 4 mg, Q5 Min PRN  ondansetron, 4 mg, Q6H PRN      Last 24 Hour Meds: :   Medication Administration - last 24 hours from 01/30/2022 1044 to 01/31/2022 1044       Date/Time Order Dose Route Action Action by     01/30/2022 1701 atorvastatin (LIPITOR) tablet 40 mg 40 mg Oral Given Jarod Arias RN     01/31/2022 0848 aspirin chewable tablet 81 mg 81 mg Oral Given Reg Young RN     01/31/2022 1004 acetaminophen (TYLENOL) tablet 650 mg 650 mg Oral Given Dina Castellanos RN     01/31/2022 0054 acetaminophen (TYLENOL) tablet 650 mg 650 mg Oral Given Courtney Wharton     01/31/2022 0521 heparin (porcine) subcutaneous injection 5,000 Units 5,000 Units Subcutaneous Not Given Courtney Wharton     01/30/2022 2120 heparin (porcine) subcutaneous injection 5,000 Units 5,000 Units Subcutaneous Given Courtney Wharton     01/30/2022 1510 heparin (porcine) subcutaneous injection 5,000 Units 5,000 Units Subcutaneous Given Brad Gao, RN     01/31/2022 1056 ticagrelor (BRILINTA) tablet 90 mg 90 mg Oral Given Dina Castellanos RN     01/30/2022 2120 ticagrelor (BRILINTA) tablet 90 mg 90 mg Oral Given Courtney Wharton     01/31/2022 0853 amLODIPine (NORVASC) tablet 10 mg 10 mg Oral Given Dina Castellanos RN     01/30/2022 1052 hydroxyurea (HYDREA) capsule 500 mg 500 mg Oral Given Juan A James, RN     01/31/2022 2065 levothyroxine tablet 112 mcg 112 mcg Oral Given Courtney Wharton     01/31/2022 0854 lisinopril (ZESTRIL) tablet 20 mg 20 mg Oral Given Dina Castellanos RN     01/31/2022 0854 metoprolol tartrate (LOPRESSOR) tablet 25 mg 25 mg Oral Given Dina Castellanos RN     01/30/2022 2120 metoprolol tartrate (LOPRESSOR) tablet 25 mg 25 mg Oral Given Courtney Wharton          PLEASE NOTE:  This encounter was completed utilizing the [employer] Direct Speech Voice Recognition Software  Grammatical errors, random word insertions, pronoun errors and incomplete sentences are occasional consequences of the system due to software limitations, ambient noise and hardware issues  These may be missed by proof reading prior to affixing electronic signature  Any questions or concerns about the content, text or information contained within the body of this dictation should be directly addressed to the physician for clarification  Please do not hesitate to call me directly if you have any any questions or concerns

## 2022-02-01 PROCEDURE — 93926 LOWER EXTREMITY STUDY: CPT | Performed by: SURGERY

## 2022-02-01 NOTE — UTILIZATION REVIEW
Notification of Discharge   This is a Notification of Discharge from our facility [address]  Please be advised that this patient has been discharge from our facility  Below you will find the admission and discharge date and time including the patients disposition  UTILIZATION REVIEW CONTACT:  [place]  Utilization   Network Utilization Review Department  Phone: [phone] x carefully listen to the prompts  All voicemails are confidential   Email: [email or web address]     PHYSICIAN ADVISORY SERVICES:  FOR JINQ-BI-WORA REVIEW - MEDICAL NECESSITY DENIAL  Phone: [phone]  Fax: [phone]  Email: [email or web address]     PRESENTATION DATE: 1/28/2022 12:54 PM  OBERVATION ADMISSION DATE:   INPATIENT ADMISSION DATE: 1/28/22  2:15 PM   DISCHARGE DATE: 1/31/2022  6:51 PM  DISPOSITION: Home/Self Care Home/Self Care      IMPORTANT INFORMATION:  Send all requests for admission clinical reviews, approved or denied determinations and any other requests to dedicated fax number below belonging to the campus where the patient is receiving treatment   List of dedicated fax numbers:  [address] DENIALS (Administrative/Medical Necessity) [phone]   [address] (Maternity/NICU/Pediatrics) [phone]   Wade De Guzman [phone]   Amara Horn [phone]   [facility] [phone]   [address] [place] [address],[address] [phone]   [facility]  [phone]   [address] [facility] [address]  [address] [facility] 1000 [facility] [phone]

## 2022-02-02 ENCOUNTER — TELEPHONE (OUTPATIENT)
Dept: INTERNAL MEDICINE CLINIC | Facility: CLINIC | Age: 77
End: 2022-02-02

## 2022-02-02 ENCOUNTER — TRANSITIONAL CARE MANAGEMENT (OUTPATIENT)
Dept: INTERNAL MEDICINE CLINIC | Facility: CLINIC | Age: 77
End: 2022-02-02

## 2022-02-02 NOTE — TELEPHONE ENCOUNTER
Pt was DC from hospital on the 30th  She was in for a heart attack  She cant drive for 2 weeks and cant get anyone to bring her in  She is seeing cardiology on 2/21  She would just like to go over her meds with you  She is having bad diarrhea and wants you to tell her if any of the meds they put her on would be causing this  She is unable to come in or do a video visit

## 2022-02-06 ENCOUNTER — LAB (OUTPATIENT)
Dept: LAB | Age: 77
End: 2022-02-06
Payer: COMMERCIAL

## 2022-02-06 DIAGNOSIS — I21.3 STEMI (ST ELEVATION MYOCARDIAL INFARCTION) (HCC): ICD-10-CM

## 2022-02-06 LAB
ERYTHROCYTE [DISTWIDTH] IN BLOOD BY AUTOMATED COUNT: 14.6 % (ref 11.6–15.1)
HCT VFR BLD AUTO: 34.3 % (ref 34.8–46.1)
HGB BLD-MCNC: 10.7 G/DL (ref 11.5–15.4)
MCH RBC QN AUTO: 33.2 PG (ref 26.8–34.3)
MCHC RBC AUTO-ENTMCNC: 31.2 G/DL (ref 31.4–37.4)
MCV RBC AUTO: 107 FL (ref 82–98)
PLATELET # BLD AUTO: 848 THOUSANDS/UL (ref 149–390)
PMV BLD AUTO: 9.1 FL (ref 8.9–12.7)
RBC # BLD AUTO: 3.22 MILLION/UL (ref 3.81–5.12)
WBC # BLD AUTO: 12.1 THOUSAND/UL (ref 4.31–10.16)

## 2022-02-06 PROCEDURE — 85027 COMPLETE CBC AUTOMATED: CPT

## 2022-02-06 PROCEDURE — 36415 COLL VENOUS BLD VENIPUNCTURE: CPT

## 2022-02-08 ENCOUNTER — OFFICE VISIT (OUTPATIENT)
Dept: CARDIOLOGY CLINIC | Facility: CLINIC | Age: 77
End: 2022-02-08
Payer: COMMERCIAL

## 2022-02-08 VITALS
OXYGEN SATURATION: 98 % | WEIGHT: 164.2 LBS | HEIGHT: 63 IN | HEART RATE: 79 BPM | SYSTOLIC BLOOD PRESSURE: 96 MMHG | BODY MASS INDEX: 29.09 KG/M2 | DIASTOLIC BLOOD PRESSURE: 52 MMHG

## 2022-02-08 DIAGNOSIS — E78.5 HYPERLIPIDEMIA, UNSPECIFIED HYPERLIPIDEMIA TYPE: ICD-10-CM

## 2022-02-08 DIAGNOSIS — Z95.5 S/P DRUG ELUTING CORONARY STENT PLACEMENT: Primary | ICD-10-CM

## 2022-02-08 DIAGNOSIS — R73.01 IMPAIRED FASTING GLUCOSE: ICD-10-CM

## 2022-02-08 DIAGNOSIS — I10 HYPERTENSION, UNSPECIFIED TYPE: ICD-10-CM

## 2022-02-08 PROCEDURE — 1160F RVW MEDS BY RX/DR IN RCRD: CPT | Performed by: NURSE PRACTITIONER

## 2022-02-08 PROCEDURE — 99215 OFFICE O/P EST HI 40 MIN: CPT | Performed by: NURSE PRACTITIONER

## 2022-02-08 PROCEDURE — 1036F TOBACCO NON-USER: CPT | Performed by: NURSE PRACTITIONER

## 2022-02-08 NOTE — PROGRESS NOTES
Cardiology Follow Up    Delisa Uribe  1945  [id number]  [facility] [address]  [address] [facility]   [phone]    1  S/P drug eluting coronary stent placement  Ambulatory referral to Cardiology   2  Hypertension, unspecified type     3  Hyperlipidemia, unspecified hyperlipidemia type     4  Impaired fasting glucose         Interval History:   Ms Gallo Chacon was admitted to [facility] on 1/28 - 1/31/22 with a STEMI  She presented to the emergency room via EMS with chest pain  EKG showed inferior lateral STEMI  An Urgent cardiac catheterization was done and showed  Left main  Minimal luminal irregularities, lad moderate started mild diffuse disease throughout the vessel  Circumflex moderate size mild diffuse disease throughout the vessel  Mid % stenosis, lesion is thrombotic  Angioplasty x2 thrombectomy with placement of Xience Skypoint HANH  0% residual stenosis  1/28/22 TTE:  Left ventricle cavity size normal LVEF 99-86% diastolic function normal moderate inferior basal hypokinesis mild concentric hypertrophy  Trace mitral valve regurgitation, trace tricuspid valve regurgitation  No evidence of aortic valve stenosis or regurgitation  Ms Gallo Chacon presents to our office for a recent hospitalization follow up visit  She   Is feeling well  Reda Jessica denies dyspnea with minimal exertion chest pain palpitations lightheadedness or dizziness  She continues with significant bruising in the right femoral and thigh area  She denies pain at the site        HPI:  Hypertension  Hyperlipidemia 10/17/21 , , HDL 42,    IFG HgbA1C 5 7 on 4/10/18   GERD  Remote tobacco abuse  Denies family hx of CAD   Patient Active Problem List   Diagnosis    Hypokalemia    [facility]rol[facility]ative disorder ([facility] [address] )    Benign essential hypertension    Essential thrombocythemia ([address] )    Fatty liver    Hypothyroidism    Impaired fasting glucose    Insomnia    Osteopenia    Prediabetes    Obesity (BMI 30 0-34  9)    STEMI involving right coronary artery [facility])     Past Medical History:   Diagnosis Date    Biliary colic     last assessed: 01/05/16    Cholecystitis     Disease of thyroid gland     hypo    Hematuria     last assessed: 05/05/17    Hypercalcemia 12/30/2019    Hypertension     Leukocytosis     last assessed: 04/05/16    Toxic nodular goiter     Uterine fibroid      Social History     Socioeconomic History    Marital status:      Spouse name: Not on file    Number of children: Not on file    Years of education: Not on file    Highest education level: Not on file   Occupational History    Occupation: Retired   Tobacco Use    Smoking status: Former Smoker     Packs/day: 0 50     Years: 40 00     Pack years: 20 00     Quit date: 1/1/2012     Years since quitting: 10 1    Smokeless tobacco: Never Used   Substance and Sexual Activity    Alcohol use: No    Drug use: No    Sexual activity: Never   Other Topics Concern    Not on file   Social History Narrative    Not on file     Social Determinants of Health     Financial Resource Strain: Not on file   Food Insecurity: No Food Insecurity    Worried About [address] in the Last Year: Never true    [facility] in the Last Year: Never true   Transportation Needs: No Transportation Needs    Lack of Transportation (Medical): No    Lack of Transportation (Non-Medical):  No   Physical Activity: Not on file   Stress: Not on file   Social Connections: Not on file   Intimate Partner Violence: Not on file   Housing Stability: Unknown    Unable to Pay for Housing in the Last Year: No    Number of Places Lived in the Last Year: Not on file    Unstable Housing in the Last Year: No      Family History   Problem Relation Age of Onset    Hypothyroidism Mother     Heart disease Brother         Cardiac disorder     Past Surgical History:   Procedure Laterality Date    CARDIAC CATHETERIZATION N/A 1/28/2022    Procedure: Cardiac pci;  Surgeon: Altagracia Meier MD;  Location: [facility]; Service: Cardiology    HYSTERECTOMY      INGRIS, with removal of both ovaries    VA LAP,CHOLECYSTECTOMY N/A 3/9/2016    Procedure: LAPAROSCOPIC CHOLECYSTECTOMY ;  Surgeon: Eliana Landin MD;  Location: [facility] MAIN OR;  Service: General    TUBAL LIGATION         Current Outpatient Medications:     amLODIPine (NORVASC) 5 mg tablet, Take 2 tablets (10 mg total) by mouth daily, Disp: 180 tablet, Rfl: 1    aspirin 81 MG tablet, Take 81 mg by mouth daily  , Disp: , Rfl:     atorvastatin (LIPITOR) 40 mg tablet, Take 1 tablet (40 mg total) by mouth every evening, Disp: 90 tablet, Rfl: 1    clopidogrel (Plavix) 75 mg tablet, Take 1 tablet (75 mg total) by mouth daily, Disp: 90 tablet, Rfl: 0    ferrous sulfate 324 (65 Fe) mg, Take 1 tablet (324 mg total) by mouth every other day, Disp: 60 tablet, Rfl: 0    hydroxyurea (HYDREA) 500 mg capsule, TAKE 1 CAPSULE BY MOUTH EVERY OTHER DAY, Disp: 30 capsule, Rfl: 11    levothyroxine 112 mcg tablet, Take 1 tablet (112 mcg total) by mouth daily, Disp: 90 tablet, Rfl: 1    lisinopril (ZESTRIL) 20 mg tablet, Take 1 tablet (20 mg total) by mouth daily, Disp: 90 tablet, Rfl: 1    metoprolol tartrate (LOPRESSOR) 25 mg tablet, Take 1 tablet (25 mg total) by mouth every 12 (twelve) hours, Disp: 90 tablet, Rfl: 0    nitroglycerin (NITROSTAT) 0 4 mg SL tablet, Place 1 tablet (0 4 mg total) under the tongue every 5 (five) minutes as needed for chest pain (Patient not taking: Reported on 2/8/2022 ), Disp: 90 tablet, Rfl: 0  No Known Allergies    Labs:  Appointment on 02/06/2022   Component Date Value    WBC 02/06/2022 12 10*    RBC 02/06/2022 3 22*    Hemoglobin 02/06/2022 10 7*    Hematocrit 02/06/2022 34 3*    MCV 02/06/2022 107*    MCH 02/06/2022 33 2     MCHC 02/06/2022 31 2*    RDW 02/06/2022 14 6     Platelets 69/30/6186 848*    MPV 02/06/2022 9 1    Admission on 01/28/2022, Discharged on 01/31/2022   Component Date Value    Sodium 01/28/2022 137     Potassium 01/28/2022 3 6     Chloride 01/28/2022 104     CO2 01/28/2022 23     ANION GAP 01/28/2022 10     BUN 01/28/2022 19     Creatinine 01/28/2022 1 07     Glucose 01/28/2022 174*    Calcium 01/28/2022 11 0*    AST 01/28/2022 20     ALT 01/28/2022 27     Alkaline Phosphatase 01/28/2022 98     Total Protein 01/28/2022 8 1     Albumin 01/28/2022 3 8     Total Bilirubin 01/28/2022 0 33     eGFR 01/28/2022 51     hs TnI 0hr 01/28/2022 86*    PTT 01/28/2022 18*    Protime 01/28/2022 12 2     INR 01/28/2022 0 94     Ventricular Rate 01/28/2022 70     Atrial Rate 01/28/2022 70     AK Interval 01/28/2022 218     QRSD Interval 01/28/2022 70     QT Interval 01/28/2022 386     QTC Interval 01/28/2022 416     P Axis 01/28/2022 55     QRS Axis 01/28/2022 31     T Wave Axis 01/28/2022 96     hs TnI 2hr 01/28/2022 1,308*    Delta 2hr hsTnI 01/28/2022 1,222*    Platelets 70/66/4762 425*    MPV 01/28/2022 9 1     A4C EF 01/28/2022 52     LV Diastolic Volume (BP) 23/51/1187 76     LV Systolic Volume (BP) 78/47/4001 37     EF 01/28/2022 52     LVIDd 01/28/2022 4 00     LVIDS 01/28/2022 2 90     IVSd 01/28/2022 1 20     LVPWd 01/28/2022 1 20     FS 01/28/2022 28     MV E' Tissue Velocity Se* 01/28/2022 5     E wave deceleration time 01/28/2022 248     MV Peak E Jboy 01/28/2022 56     MV Peak A Joby 01/28/2022 1 1     RVID d 01/28/2022 3 0     LA size 01/28/2022 3 6     MARGUERITE A4C 01/28/2022 18 3     RAA A4C 01/28/2022 12 4     MV stenosis pressure 1/2* 01/28/2022 0     Ao root 01/28/2022 2 90     Asc Ao 01/28/2022 3 1     Left ventricular stroke * 01/28/2022 37 00     LEFT VENTRICLE SYSTOLIC * 42/52/4916 33     LV DIASTOLIC VOLUME (MOD* 26/15/8955 70     Ao asc z-score 01/28/2022 2 65     ZLVPWD 01/28/2022 4 05     ZLVIDS 01/28/2022 -2 65     ZIVSD 01/28/2022 3 94     LV WELLS VOL ENDO Z SCORE 01/28/2022 -2 37     LV EF 01/28/2022 55     hs TnI 4hr 01/28/2022 3,319*    Delta 4hr hsTnI 01/28/2022 3,233*    Sodium 01/29/2022 138     Potassium 01/29/2022 3 7     Chloride 01/29/2022 109*    CO2 01/29/2022 23     ANION GAP 01/29/2022 6     BUN 01/29/2022 21     Creatinine 01/29/2022 0 78     Glucose 01/29/2022 128     Calcium 01/29/2022 9 0     Corrected Calcium 01/29/2022 10 1     AST 01/29/2022 46*    ALT 01/29/2022 26     Alkaline Phosphatase 01/29/2022 59     Total Protein 01/29/2022 5 3*    Albumin 01/29/2022 2 6*    Total Bilirubin 01/29/2022 0 71     eGFR 01/29/2022 75     Magnesium 01/29/2022 2 0     WBC 01/29/2022 13 69*    RBC 01/29/2022 2 83*    Hemoglobin 01/29/2022 9 4*    Hematocrit 01/29/2022 28 3*    MCV 01/29/2022 100*    MCH 01/29/2022 33 2     MCHC 01/29/2022 33 2     RDW 01/29/2022 13 7     MPV 01/29/2022 9 1     Platelets 56/92/8055 378     nRBC 01/29/2022 0     Neutrophils Relative 01/29/2022 78*    Immat GRANS % 01/29/2022 1     Lymphocytes Relative 01/29/2022 12*    Monocytes Relative 01/29/2022 9     Eosinophils Relative 01/29/2022 0     Basophils Relative 01/29/2022 0     Neutrophils Absolute 01/29/2022 10 67*    Immature Grans Absolute 01/29/2022 0 08     Lymphocytes Absolute 01/29/2022 1 63     Monocytes Absolute 01/29/2022 1 25*    Eosinophils Absolute 01/29/2022 0 01     Basophils Absolute 01/29/2022 0 05     Ventricular Rate 01/28/2022 85     Atrial Rate 01/28/2022 85     MD Interval 01/28/2022 170     QRSD Interval 01/28/2022 68     QT Interval 01/28/2022 418     QTC Interval 01/28/2022 497     P Axis 01/28/2022 55     QRS Axis 01/28/2022 -12     T Wave Axis 01/28/2022 -35     Sodium 01/31/2022 142     Potassium 01/31/2022 3 5     Chloride 01/31/2022 114*    CO2 01/31/2022 24     ANION GAP 01/31/2022 4     BUN 01/31/2022 11     Creatinine 01/31/2022 0 63     Glucose 01/31/2022 89     Calcium 01/31/2022 9 0     eGFR 01/31/2022 88     WBC 01/31/2022 8 42     RBC 01/31/2022 2 74*    Hemoglobin 01/31/2022 9 0*    Hematocrit 01/31/2022 27 8*    MCV 01/31/2022 102*    MCH 01/31/2022 32 8     MCHC 01/31/2022 32 4     RDW 01/31/2022 13 7     MPV 01/31/2022 9 3     Platelets 86/24/4924 415*    nRBC 01/31/2022 0     Neutrophils Relative 01/31/2022 74     Immat GRANS % 01/31/2022 1     Lymphocytes Relative 01/31/2022 15     Monocytes Relative 01/31/2022 8     Eosinophils Relative 01/31/2022 1     Basophils Relative 01/31/2022 1     Neutrophils Absolute 01/31/2022 6 36     Immature Grans Absolute 01/31/2022 0 05     Lymphocytes Absolute 01/31/2022 1 25     Monocytes Absolute 01/31/2022 0 63     Eosinophils Absolute 01/31/2022 0 09     Basophils Absolute 01/31/2022 0 04     Activated Clotting Time,* 01/28/2022 232*    Specimen Type 01/28/2022 VENOUS      Imaging: Cardiac catheterization    Result Date: 1/28/2022  Narrative: · Mid RCA lesion is 100% stenosed  · The angioplasty was pre-stent angioplasty  1v CAD     VAS pseudoaneurysm study    Result Date: 2/1/2022  Narrative:  [facility] REPORT CLINICAL: Indications:  Patient presents S/P cardiac catheterization (1/28/22 ) with gross diffuse bruising and palpable lumb in right groin area  Physician wants to rule out pseudoaneurysm  Operative History: 2022-01-28 Right Cardiac Catheterization Risk Factors The patient has history of HTN and CAD  FINDINGS:  Right                  PSV (cm/s)  Dist EIA                      110  Common Femoral Artery         134  Prox Profunda                 167  Prox SFA                      228     CONCLUSION: Impression: No evidence of a pseudoaneurysm in the femoral artery  The common femoral vein is patent with no evidence of an arteriovenous fistula noted  Incidental findings:  There is a non vascular, hypoechoic structure suggestive of a hematoma measuring 12 71 cm  Technical Noted: KATT and waveform analysis not obtained, secondary to negative findings  Technical findings posted in [facility]  SIGNATURE: Electronically Signed by: Yang Vilchis on 2022-02-01 04:42:48 PM    Echo complete w/ contrast if indicated    Result Date: 1/29/2022  Narrative: Bakari Kyle  Left Ventricle: Left ventricular cavity size is normal  The left ventricular ejection fraction is 50-55%  Systolic function is low normal  Diastolic function is normal  Moderate infero-basal hypokinesis There is mild concentric hypertrophy    Mitral Valve: There is mild annular calcification  There is trace regurgitation    Tricuspid Valve: There is trace regurgitation  Review of Systems:  Review of Systems   Gastrointestinal: Positive for constipation  All other systems reviewed and are negative  Physical Exam:  Physical Exam  Constitutional:       Appearance: Normal appearance  HENT:      Head: Normocephalic  Eyes:      Pupils: Pupils are equal, round, and reactive to light  Cardiovascular:      Rate and Rhythm: Normal rate and regular rhythm  Pulses: Normal pulses  Heart sounds: Normal heart sounds  Pulmonary:      Effort: Pulmonary effort is normal       Breath sounds: Normal breath sounds  Abdominal:      General: Bowel sounds are normal    Musculoskeletal:         General: Normal range of motion  Cervical back: Normal range of motion and neck supple  Right lower leg: No edema  Left lower leg: No edema  Skin:     General: Skin is warm and dry  Capillary Refill: Capillary refill takes less than 2 seconds  Findings: Bruising present  Comments: Right femoral area with large amount of ecchymosis, hematoma right groin, denies pain   Neurological:      Mental Status: She is alert and oriented to person, place, and time  Mental status is at baseline     Psychiatric:         Mood and Affect: Mood normal          Behavior: Behavior normal  Discussion/Summary:  1  1/28/22 [facility] Mid % stenosis, lesion thrombotic  Angioplasty x2,  thrombectomy with placement of Xience Skypoint HANH  0% residual stenosis,  Continue on aspirin 81 mg daily, Plavix 75 mg daily, metoprolol tartrate 25 mg q 12 hours, lisinopril 20 mg daily, Norvasc 10 mg daily,  Lipitor 40 mg daily  I have linens were not stop aspirin or Plavix for any reason  Handout information provided on MI and heart healthy diet  Right groin with bruising instructed on heating pad 20 minutes 4 times a day until bruising improved complaining of constipation instructed on over-the-counter Colace 1-2 tabs daily increase fiber in diet or use over-the-counter Metamucil  2  Hypertension- RUE sitting 100/60, home BP 17/74, denies  Lightheadedness or dizziness  Continue on amlodipine 10 mg daily, lisinopril 20 mg daily,  Metoprolol tartrate 25 mg q 12 hours 2 g sodium diet  3  Hyperlipidemia 10/17/21 , , HDL 42,  -  Continue Lipitor 40 mg daily  Handout education provided on a heart healthy Mediterranean diet  Instructed on daily walking  4   IFG HgbA1C 5 7 on 4/10/18, follow up with PCP

## 2022-02-08 NOTE — PATIENT INSTRUCTIONS
2gm sodium low fat low cholesterol diet, low carbohydrate, no concentrated sweets, eating fresh is best, fresh fruit, fresh vegetables, lean protein, avoid red meat     Add more fiber into your diet  Over the counter Colace 1-2 daily when needed or metamucil   Walk 30 minutes every day     Warm compresses such as a heating pad to right groin and leg for 20 minutes 4 times a day

## 2022-02-14 ENCOUNTER — OFFICE VISIT (OUTPATIENT)
Dept: INTERNAL MEDICINE CLINIC | Facility: CLINIC | Age: 77
End: 2022-02-14
Payer: COMMERCIAL

## 2022-02-14 VITALS
HEIGHT: 63 IN | HEART RATE: 81 BPM | TEMPERATURE: 97 F | WEIGHT: 163.6 LBS | OXYGEN SATURATION: 98 % | BODY MASS INDEX: 28.99 KG/M2 | DIASTOLIC BLOOD PRESSURE: 70 MMHG | SYSTOLIC BLOOD PRESSURE: 120 MMHG | RESPIRATION RATE: 16 BRPM

## 2022-02-14 DIAGNOSIS — I10 BENIGN ESSENTIAL HYPERTENSION: ICD-10-CM

## 2022-02-14 DIAGNOSIS — S30.1XXS HEMATOMA OF GROIN, SEQUELA: ICD-10-CM

## 2022-02-14 DIAGNOSIS — I21.11 STEMI INVOLVING RIGHT CORONARY ARTERY (HCC): Primary | ICD-10-CM

## 2022-02-14 PROCEDURE — 1111F DSCHRG MED/CURRENT MED MERGE: CPT | Performed by: INTERNAL MEDICINE

## 2022-02-14 PROCEDURE — 3008F BODY MASS INDEX DOCD: CPT | Performed by: NURSE PRACTITIONER

## 2022-02-14 PROCEDURE — 99495 TRANSJ CARE MGMT MOD F2F 14D: CPT | Performed by: INTERNAL MEDICINE

## 2022-02-14 NOTE — PROGRESS NOTES
Assessment/Plan:    Problem List Items Addressed This Visit        Cardiovascular and Mediastinum    Benign essential hypertension     -tolerating new regimen of amlodipine 10mg daily, lisinopril 20mg daily and metoprolol 25mg q12         STEMI involving right coronary artery (HCC) - Primary     -s/p HANH to RCA  -pt to continue with DAPT, statin, lisinopril and metoprolol  -consider pepcid for gastroprotection  -refuses cardiac rehab           Other Visit Diagnoses     Hematoma of groin, sequela        -secondary to cardiac cath  -size is decreasing, continue to apply warm compress          Subjective:      Patient ID: Best Luciano is a 78 y o  female  HPI  TCM Call (since 1/14/2022)     Date and time call was made  2/2/2022 10:07 AM    Hospital care reviewed  Records reviewed        Patient was hospitial[facility] at  [facility]        Date of Admission  01/28/22    Date of discharge  01/31/22    Diagnosis  heart attack      TCM Call (since 1/14/2022)     Scheduled for follow up? Patient refused  pt will see cardiology on 2/21 and refused our visit    I have advised the patient to call PCP with any new or worsening symptoms  Lily Rodriguez MA        57US female with hypothyroidism, IFG, HTN, insomnia, ET, fatty liver here for OTTO  She was hospitalized [facility] 1/28-1/31/22 for STEMI of RCA  She underwent PCI  She developed a hematoma in the groin, no pseudoaneurysm seen on u/s  She was discharged on DAPT with ASA and plavix, started on metoprolol  BP was low so HCTZ was discontinued  Reports R groin lump is improving, applies heat on it multiple times per day  She was cleared to drive today  Home BP have been around 117/60-70s, pulse 60-80s  She is starting to do more things but does get fatigue  Denies dizziness, HA, vision, CP  Gets SPIVEY  She initially had complaint of diarrhea after discharge but only lasted one day  She is compliant with iron      The following portions of the patient's history were reviewed and updated as appropriate: allergies, current medications, past family history, past medical history, past social history, past surgical history and problem list       Current Outpatient Medications:     amLODIPine (NORVASC) 5 mg tablet, Take 2 tablets (10 mg total) by mouth daily, Disp: 180 tablet, Rfl: 1    aspirin 81 MG tablet, Take 81 mg by mouth daily  , Disp: , Rfl:     atorvastatin (LIPITOR) 40 mg tablet, Take 1 tablet (40 mg total) by mouth every evening, Disp: 90 tablet, Rfl: 1    clopidogrel (Plavix) 75 mg tablet, Take 1 tablet (75 mg total) by mouth daily, Disp: 90 tablet, Rfl: 0    ferrous sulfate 324 (65 Fe) mg, Take 1 tablet (324 mg total) by mouth every other day, Disp: 60 tablet, Rfl: 0    hydroxyurea (HYDREA) 500 mg capsule, TAKE 1 CAPSULE BY MOUTH EVERY OTHER DAY, Disp: 30 capsule, Rfl: 11    levothyroxine 112 mcg tablet, Take 1 tablet (112 mcg total) by mouth daily, Disp: 90 tablet, Rfl: 1    lisinopril (ZESTRIL) 20 mg tablet, Take 1 tablet (20 mg total) by mouth daily, Disp: 90 tablet, Rfl: 1    metoprolol tartrate (LOPRESSOR) 25 mg tablet, Take 1 tablet (25 mg total) by mouth every 12 (twelve) hours, Disp: 90 tablet, Rfl: 0    nitroglycerin (NITROSTAT) 0 4 mg SL tablet, Place 1 tablet (0 4 mg total) under the tongue every 5 (five) minutes as needed for chest pain, Disp: 90 tablet, Rfl: 0    Review of Systems   Constitutional: Positive for activity change, appetite change and fatigue  Negative for unexpected weight change  Respiratory: Positive for shortness of breath (SPIVEY)  Negative for cough, chest tightness and wheezing  Cardiovascular: Negative for chest pain, palpitations and leg swelling  Gastrointestinal: Positive for constipation  Negative for abdominal pain, diarrhea and nausea  Neurological: Negative for dizziness and headaches  Hematological: Bruises/bleeds easily  Psychiatric/Behavioral: Negative for sleep disturbance  Objective:    /70 (BP Location: Left arm, Patient Position: Sitting)   Pulse 81   Temp (!) 97 °F (36 1 °C) (Skin)   Resp 16   Ht 5' 3" (1 6 m)   Wt 74 2 kg (163 lb 9 6 oz)   SpO2 98%   BMI 28 98 kg/m²      Physical Exam  Constitutional:       General: She is not in acute distress  Appearance: She is not diaphoretic  HENT:      Mouth/Throat:      Mouth: Mucous membranes are moist       Pharynx: Oropharynx is clear  No oropharyngeal exudate or posterior oropharyngeal erythema  Neck:      Thyroid: No thyromegaly  Cardiovascular:      Rate and Rhythm: Normal rate and regular rhythm  Pulses: Normal pulses  Heart sounds: Normal heart sounds  Pulmonary:      Effort: Pulmonary effort is normal  No respiratory distress  Breath sounds: Normal breath sounds  No wheezing  Abdominal:      General: Bowel sounds are normal  There is no distension  Palpations: Abdomen is soft  Tenderness: There is no abdominal tenderness  Musculoskeletal:      Cervical back: Neck supple  Right lower leg: No edema  Left lower leg: No edema  Lymphadenopathy:      Cervical: No cervical adenopathy  Skin:     General: Skin is warm  Findings: Bruising present  Comments: approx 10cm hematoma on R groin   Neurological:      Mental Status: She is alert and oriented to person, place, and time     Psychiatric:         Attention and Perception: Attention normal          Mood and Affect: Mood normal          Speech: Speech normal

## 2022-02-14 NOTE — ASSESSMENT & PLAN NOTE
-s/p HANH to RCA  -pt to continue with DAPT, statin, lisinopril and metoprolol  -consider pepcid for gastroprotection  -refuses cardiac rehab

## 2022-02-22 DIAGNOSIS — I10 BENIGN HYPERTENSION: ICD-10-CM

## 2022-02-22 DIAGNOSIS — I10 BENIGN ESSENTIAL HYPERTENSION: ICD-10-CM

## 2022-02-22 RX ORDER — LISINOPRIL 20 MG/1
20 TABLET ORAL DAILY
Qty: 90 TABLET | Refills: 1 | Status: SHIPPED | OUTPATIENT
Start: 2022-02-22

## 2022-02-22 RX ORDER — AMLODIPINE BESYLATE 5 MG/1
10 TABLET ORAL DAILY
Qty: 180 TABLET | Refills: 1 | Status: SHIPPED | OUTPATIENT
Start: 2022-02-22

## 2022-03-14 DIAGNOSIS — I21.3 STEMI (ST ELEVATION MYOCARDIAL INFARCTION) (HCC): ICD-10-CM

## 2022-03-24 DIAGNOSIS — E03.9 ACQUIRED HYPOTHYROIDISM: ICD-10-CM

## 2022-03-24 RX ORDER — LEVOTHYROXINE SODIUM 112 UG/1
112 TABLET ORAL DAILY
Qty: 90 TABLET | Refills: 1 | Status: SHIPPED | OUTPATIENT
Start: 2022-03-24

## 2022-04-15 ENCOUNTER — TELEPHONE (OUTPATIENT)
Dept: HEMATOLOGY ONCOLOGY | Facility: CLINIC | Age: 77
End: 2022-04-15

## 2022-04-15 NOTE — PROGRESS NOTES
Cardiology Follow Up    Gabriela Christopher  1945  [id number]  [facility] [address]  [address] [facility] [address]   [phone]    1  Essential (primary) hypertension  Echo complete w/ contrast if indicated    LDL cholesterol, direct    Hepatic function panel    Lipid panel   2  Hyperlipidemia, unspecified hyperlipidemia type  Echo complete w/ contrast if indicated    LDL cholesterol, direct    Hepatic function panel    Lipid panel   3  S/P drug eluting coronary stent placement  Echo complete w/ contrast if indicated    LDL cholesterol, direct    Hepatic function panel    Lipid panel   4  STEMI involving right coronary artery (HCC)  Echo complete w/ contrast if indicated    LDL cholesterol, direct    Hepatic function panel    Lipid panel       Interval History:  Patient is here for a follow-up visit  She was hospitalized January 2022 with a RCA STEMI  Cardiac catheterization done January 28, 2022 demonstrated closed RCA with successful PCI and placement of a HANH  Other vessels had mild diffuse disease  Echocardiogram January 2022 demonstrated LVEF of 50-55% with inferobasal WMA noted and mild LVH  There was no significant valvular heart disease  Patient was seen by our CRNP February 2022  Patient has HTN and HLD  Patient's vital signs today are stable  She has had no chest pain or significant dyspnea  She has brief episodes of dizziness that lasts seconds when she is in bed  This sounds like vertigo  She has also had some difficulty with waking up in not being able to get back to sleep  She did review this with her PCP  Patient Active Problem List   Diagnosis    Myeloproliferative disorder ([address] )    Benign essential hypertension    Essential thrombocythemia ([address] )    Fatty liver    Hypothyroidism    Impaired fasting glucose    Insomnia    Osteopenia    Prediabetes    Obesity (BMI 30 0-34  9)    STEMI involving right coronary artery [facility])     Past Medical History:   Diagnosis Date    Biliary colic     last assessed: 01/05/16    Cholecystitis     Disease of thyroid gland     hypo    Hematuria     last assessed: 05/05/17    Hypercalcemia 12/30/2019    Hypertension     Hypokalemia 1/22/2016    Leukocytosis     last assessed: 04/05/16    Toxic nodular goiter     Uterine fibroid      Social History     Socioeconomic History    Marital status:      Spouse name: Not on file    Number of children: Not on file    Years of education: Not on file    Highest education level: Not on file   Occupational History    Occupation: Retired   Tobacco Use    Smoking status: Former Smoker     Packs/day: 0 50     Years: 40 00     Pack years: 20 00     Quit date: 1/1/2012     Years since quitting: 10 3    Smokeless tobacco: Never Used   Substance and Sexual Activity    Alcohol use: No    Drug use: No    Sexual activity: Never   Other Topics Concern    Not on file   Social History Narrative    Not on file     Social Determinants of Health     Financial Resource Strain: Not on file   Food Insecurity: No Food Insecurity    Worried About [address] [employer] in the Last Year: Never true    [facility] in the Last Year: Never true   Transportation Needs: No Transportation Needs    Lack of Transportation (Medical): No    Lack of Transportation (Non-Medical):  No   Physical Activity: Not on file   Stress: Not on file   Social Connections: Not on file   Intimate Partner Violence: Not on file   Housing Stability: Unknown    Unable to Pay for Housing in the Last Year: No    Number of Places Lived in the Last Year: Not on file    Unstable Housing in the Last Year: No      Family History   Problem Relation Age of Onset    Hypothyroidism Mother     Heart disease Brother         Cardiac disorder     Past Surgical History:   Procedure Laterality Date    CARDIAC CATHETERIZATION N/A 1/28/2022    Procedure: Cardiac pci;  Surgeon: Ricky Ledezma MD;  [facility]; Service: Cardiology    HYSTERECTOMY      INGRIS, with removal of both ovaries    AR LAP,CHOLECYSTECTOMY N/A 3/9/2016    Procedure: LAPAROSCOPIC CHOLECYSTECTOMY ;  Surgeon: Eric Koyanagi, MD;  Location: [facility] MAIN [facility];  Service: General    TUBAL LIGATION         Current Outpatient Medications:     amLODIPine (NORVASC) 5 mg tablet, Take 2 tablets (10 mg total) by mouth daily, Disp: 180 tablet, Rfl: 1    aspirin 81 MG tablet, Take 81 mg by mouth daily  , Disp: , Rfl:     atorvastatin (LIPITOR) 40 mg tablet, Take 1 tablet (40 mg total) by mouth every evening, Disp: 90 tablet, Rfl: 1    clopidogrel (Plavix) 75 mg tablet, Take 1 tablet (75 mg total) by mouth daily, Disp: 90 tablet, Rfl: 0    ferrous sulfate 324 (65 Fe) mg, Take 1 tablet (324 mg total) by mouth every other day, Disp: 60 tablet, Rfl: 0    hydroxyurea (HYDREA) 500 mg capsule, TAKE 1 CAPSULE BY MOUTH EVERY OTHER DAY, Disp: 30 capsule, Rfl: 11    levothyroxine 112 mcg tablet, Take 1 tablet (112 mcg total) by mouth daily, Disp: 90 tablet, Rfl: 1    lisinopril (ZESTRIL) 20 mg tablet, Take 1 tablet (20 mg total) by mouth daily, Disp: 90 tablet, Rfl: 1    metoprolol tartrate (LOPRESSOR) 25 mg tablet, Take 1 tablet (25 mg total) by mouth every 12 (twelve) hours, Disp: 90 tablet, Rfl: 1    nitroglycerin (NITROSTAT) 0 4 mg SL tablet, Place 1 tablet (0 4 mg total) under the tongue every 5 (five) minutes as needed for chest pain, Disp: 90 tablet, Rfl: 0  No Known Allergies    Labs:not applicable  Imaging: No results found  Review of Systems:  Review of Systems   All other systems reviewed and are negative  Physical Exam:  /76 (BP Location: Left arm, Patient Position: Sitting, Cuff Size: Standard)   Pulse 62   Ht 5' 3" (1 6 m)   Wt 75 5 kg (166 lb 8 oz)   SpO2 98%   BMI 29 49 kg/m²   Physical Exam  Vitals reviewed  Constitutional:       Appearance: She is well-developed     HENT: Head: Normocephalic and atraumatic  Eyes:      Conjunctiva/sclera: Conjunctivae normal       Pupils: Pupils are equal, round, and reactive to light  Cardiovascular:      Rate and Rhythm: Normal rate  Heart sounds: Normal heart sounds  Pulmonary:      Effort: Pulmonary effort is normal       Breath sounds: Normal breath sounds  Musculoskeletal:      Cervical back: Normal range of motion and neck supple  Skin:     General: Skin is warm and dry  Neurological:      Mental Status: She is alert and oriented to person, place, and time  Discussion/Summary:I will continue the patient's current medical regimen  The patient appears well compensated  I have asked the patient to call if there is a problem in the interim otherwise I will see the patient in six months time  The patient is due for an echo prior to the next visit to assess LV wall thickness and systolic function  Will check blood work in reference to her cholesterol status

## 2022-04-15 NOTE — TELEPHONE ENCOUNTER
Appointment Cancellation Or Reschedule     Person calling in Patient    Provider [facility] Visit Date and Time 4/19/22 at 8:30   Office Visit Location [facility]   Did patient want to reschedule their office appointment? If so, when was it scheduled to? 5/24/22 at 8:30   Is this patient calling to reschedule an infusion appointment? No   When is their next infusion appointment? N/a   Is this patient a Chemo patient? No   Reason for Cancellation or Reschedule Lab work      If the patient is a treatment patient, please route this to the office nurse  If the patient is not on treatment, please route to the office MA

## 2022-04-25 ENCOUNTER — OFFICE VISIT (OUTPATIENT)
Dept: CARDIOLOGY CLINIC | Facility: CLINIC | Age: 77
End: 2022-04-25
Payer: COMMERCIAL

## 2022-04-25 VITALS
WEIGHT: 166.5 LBS | HEIGHT: 63 IN | BODY MASS INDEX: 29.5 KG/M2 | OXYGEN SATURATION: 98 % | DIASTOLIC BLOOD PRESSURE: 76 MMHG | SYSTOLIC BLOOD PRESSURE: 128 MMHG | HEART RATE: 62 BPM

## 2022-04-25 DIAGNOSIS — I10 ESSENTIAL (PRIMARY) HYPERTENSION: Primary | ICD-10-CM

## 2022-04-25 DIAGNOSIS — E78.5 HYPERLIPIDEMIA, UNSPECIFIED HYPERLIPIDEMIA TYPE: ICD-10-CM

## 2022-04-25 DIAGNOSIS — I21.11 STEMI INVOLVING RIGHT CORONARY ARTERY (HCC): ICD-10-CM

## 2022-04-25 DIAGNOSIS — Z95.5 S/P DRUG ELUTING CORONARY STENT PLACEMENT: ICD-10-CM

## 2022-04-25 PROCEDURE — 1160F RVW MEDS BY RX/DR IN RCRD: CPT | Performed by: INTERNAL MEDICINE

## 2022-04-25 PROCEDURE — 99214 OFFICE O/P EST MOD 30 MIN: CPT | Performed by: INTERNAL MEDICINE

## 2022-04-25 PROCEDURE — 3078F DIAST BP <80 MM HG: CPT | Performed by: INTERNAL MEDICINE

## 2022-04-25 PROCEDURE — 3008F BODY MASS INDEX DOCD: CPT | Performed by: INTERNAL MEDICINE

## 2022-04-25 PROCEDURE — 3074F SYST BP LT 130 MM HG: CPT | Performed by: INTERNAL MEDICINE

## 2022-04-25 PROCEDURE — 1036F TOBACCO NON-USER: CPT | Performed by: INTERNAL MEDICINE

## 2022-04-25 RX ORDER — CLOPIDOGREL BISULFATE 75 MG/1
75 TABLET ORAL DAILY
Qty: 90 TABLET | Refills: 3 | Status: SHIPPED | OUTPATIENT
Start: 2022-04-25

## 2022-04-25 NOTE — ADDENDUM NOTE
Addended by: Skip Ny on: 4/25/2022 09:18 AM     Modules accepted: Orders Has The Growth Been Previously Biopsied?: has been previously biopsied Body Location Override (Optional): Left central forehead

## 2022-04-25 NOTE — PATIENT INSTRUCTIONS
I will continue the patient's present medical regimen  The patient appears well compensated  I have asked the patient to call if there is a problem in the interim otherwise I will see the patient in six months time  Will check an echocardiogram and blood work

## 2022-05-01 ENCOUNTER — APPOINTMENT (OUTPATIENT)
Dept: LAB | Age: 77
End: 2022-05-01
Payer: COMMERCIAL

## 2022-05-01 DIAGNOSIS — I10 BENIGN ESSENTIAL HYPERTENSION: ICD-10-CM

## 2022-05-01 DIAGNOSIS — E03.9 ACQUIRED HYPOTHYROIDISM: ICD-10-CM

## 2022-05-01 DIAGNOSIS — R73.01 IMPAIRED FASTING GLUCOSE: ICD-10-CM

## 2022-05-01 LAB
BILIRUB UR QL STRIP: NEGATIVE
CLARITY UR: CLEAR
COLOR UR: NORMAL
EST. AVERAGE GLUCOSE BLD GHB EST-MCNC: 105 MG/DL
GLUCOSE UR STRIP-MCNC: NEGATIVE MG/DL
HBA1C MFR BLD: 5.3 %
HGB UR QL STRIP.AUTO: NEGATIVE
KETONES UR STRIP-MCNC: NEGATIVE MG/DL
LEUKOCYTE ESTERASE UR QL STRIP: NEGATIVE
NITRITE UR QL STRIP: NEGATIVE
PH UR STRIP.AUTO: 7 [PH]
PROT UR STRIP-MCNC: NEGATIVE MG/DL
SP GR UR STRIP.AUTO: 1.01 (ref 1–1.03)
TSH SERPL DL<=0.05 MIU/L-ACNC: 0.67 UIU/ML (ref 0.45–4.5)
UROBILINOGEN UR STRIP-ACNC: <2 MG/DL

## 2022-05-01 PROCEDURE — 36415 COLL VENOUS BLD VENIPUNCTURE: CPT

## 2022-05-01 PROCEDURE — 83036 HEMOGLOBIN GLYCOSYLATED A1C: CPT

## 2022-05-01 PROCEDURE — 81003 URINALYSIS AUTO W/O SCOPE: CPT

## 2022-05-01 PROCEDURE — 84443 ASSAY THYROID STIM HORMONE: CPT

## 2022-05-03 ENCOUNTER — OFFICE VISIT (OUTPATIENT)
Dept: INTERNAL MEDICINE CLINIC | Facility: CLINIC | Age: 77
End: 2022-05-03
Payer: COMMERCIAL

## 2022-05-03 VITALS
BODY MASS INDEX: 29.48 KG/M2 | WEIGHT: 166.4 LBS | OXYGEN SATURATION: 98 % | HEART RATE: 70 BPM | HEIGHT: 63 IN | DIASTOLIC BLOOD PRESSURE: 86 MMHG | TEMPERATURE: 98.8 F | SYSTOLIC BLOOD PRESSURE: 126 MMHG

## 2022-05-03 DIAGNOSIS — I21.11 STEMI INVOLVING RIGHT CORONARY ARTERY (HCC): ICD-10-CM

## 2022-05-03 DIAGNOSIS — I10 BENIGN ESSENTIAL HYPERTENSION: ICD-10-CM

## 2022-05-03 DIAGNOSIS — H81.12 VERTIGO, BENIGN PAROXYSMAL, LEFT: Primary | ICD-10-CM

## 2022-05-03 DIAGNOSIS — Z95.5 PRESENCE OF DRUG-ELUTING STENT IN RIGHT CORONARY ARTERY: ICD-10-CM

## 2022-05-03 DIAGNOSIS — E03.9 ACQUIRED HYPOTHYROIDISM: ICD-10-CM

## 2022-05-03 PROBLEM — E66.811 OBESITY (BMI 30.0-34.9): Status: RESOLVED | Noted: 2019-06-21 | Resolved: 2022-05-03

## 2022-05-03 PROBLEM — E66.9 OBESITY (BMI 30.0-34.9): Status: RESOLVED | Noted: 2019-06-21 | Resolved: 2022-05-03

## 2022-05-03 PROCEDURE — 1160F RVW MEDS BY RX/DR IN RCRD: CPT | Performed by: INTERNAL MEDICINE

## 2022-05-03 PROCEDURE — 1036F TOBACCO NON-USER: CPT | Performed by: INTERNAL MEDICINE

## 2022-05-03 PROCEDURE — 99214 OFFICE O/P EST MOD 30 MIN: CPT | Performed by: INTERNAL MEDICINE

## 2022-05-03 PROCEDURE — 3074F SYST BP LT 130 MM HG: CPT | Performed by: INTERNAL MEDICINE

## 2022-05-03 PROCEDURE — 3079F DIAST BP 80-89 MM HG: CPT | Performed by: INTERNAL MEDICINE

## 2022-05-03 PROCEDURE — 3008F BODY MASS INDEX DOCD: CPT | Performed by: INTERNAL MEDICINE

## 2022-05-03 NOTE — PROGRESS NOTES
Assessment/Plan:    Problem List Items Addressed This Visit        Endocrine    Hypothyroidism     -TSH nomal  -continue levothyroxine 112mcg daily         Relevant Orders    TSH, 3rd generation with Free T4 reflex       Cardiovascular and Mediastinum    Benign essential hypertension     -home bp controlled  -adheres to low sodium diet  -continue amlodipine 10mg daily, lisinopril 20mg daily and metoprolol 25mg q12  -monitor BMP         STEMI involving right coronary artery (HCC)     -s/p HANH to RCA  -tolerating DAPT  -continue atorvastatin, lisinopril, metoprolol and lisinopril  -she has been asymptomatic  -continue follow up with Cardio            Nervous and Auditory    Vertigo, benign paroxysmal, left - Primary     -offered meclizine and vestibular rehab, declined  -call if she reconsiders            Other    Presence of drug-eluting stent in right coronary artery          Subjective:      Patient ID: Luanne Quinteros is a 78 y o  female  70yo female with HTN, STEMI s/p HANH to RCA, IFG, insomnia, ET, fatty liver and hypothyroidism here for follow up care  Three weeks ago she had episode of dizziness when she laid on her R side to her L side that was transient  Occurred for two nights  Hypertension  This is a chronic problem  The current episode started more than 1 month ago  The problem is controlled (Home BP in AM  122-134/70s, -125/70s)  Pertinent negatives include no chest pain, headaches, palpitations or shortness of breath  Risk factors for coronary artery disease include post-menopausal state and sedentary lifestyle  Past treatments include beta blockers, calcium channel blockers and ACE inhibitors  Hypertensive end-organ damage includes CAD/MI  Identifiable causes of hypertension include a thyroid problem  Thyroid Problem  Presents for follow-up visit  Symptoms include weight gain  Patient reports no fatigue or palpitations  The symptoms have been stable         She denies chest pain in the interim  She also denies SOB, bleeding episode on plavix  She was started on atorvastatin at time of STEMI and denies muscle aches  The following portions of the patient's history were reviewed and updated as appropriate: allergies, current medications, past family history, past medical history, past social history, past surgical history and problem list       Current Outpatient Medications:     amLODIPine (NORVASC) 5 mg tablet, Take 2 tablets (10 mg total) by mouth daily, Disp: 180 tablet, Rfl: 1    aspirin 81 MG tablet, Take 81 mg by mouth daily  , Disp: , Rfl:     atorvastatin (LIPITOR) 40 mg tablet, Take 1 tablet (40 mg total) by mouth every evening, Disp: 90 tablet, Rfl: 1    clopidogrel (Plavix) 75 mg tablet, Take 1 tablet (75 mg total) by mouth daily, Disp: 90 tablet, Rfl: 3    ferrous sulfate 324 (65 Fe) mg, Take 1 tablet (324 mg total) by mouth every other day, Disp: 60 tablet, Rfl: 0    hydroxyurea (HYDREA) 500 mg capsule, TAKE 1 CAPSULE BY MOUTH EVERY OTHER DAY, Disp: 30 capsule, Rfl: 11    levothyroxine 112 mcg tablet, Take 1 tablet (112 mcg total) by mouth daily, Disp: 90 tablet, Rfl: 1    lisinopril (ZESTRIL) 20 mg tablet, Take 1 tablet (20 mg total) by mouth daily, Disp: 90 tablet, Rfl: 1    metoprolol tartrate (LOPRESSOR) 25 mg tablet, Take 1 tablet (25 mg total) by mouth every 12 (twelve) hours, Disp: 90 tablet, Rfl: 1    nitroglycerin (NITROSTAT) 0 4 mg SL tablet, Place 1 tablet (0 4 mg total) under the tongue every 5 (five) minutes as needed for chest pain, Disp: 90 tablet, Rfl: 0    Review of Systems   Constitutional: Positive for appetite change, unexpected weight change (weight gain) and weight gain  Negative for activity change and fatigue  Respiratory: Negative for cough, shortness of breath and wheezing  Cardiovascular: Negative for chest pain, palpitations and leg swelling  Musculoskeletal: Positive for myalgias  Neurological: Positive for dizziness   Negative for headaches  Hematological: Bruises/bleeds easily  Psychiatric/Behavioral: Positive for sleep disturbance (intermittently)  Objective:    /86 (BP Location: Left arm, Patient Position: Sitting, Cuff Size: Standard)   Pulse 70   Temp 98 8 °F (37 1 °C) (Tympanic)   Ht 5' 3" (1 6 m)   Wt 75 5 kg (166 lb 6 4 oz)   SpO2 98%   BMI 29 48 kg/m²       Physical Exam  Vitals reviewed  Constitutional:       General: She is not in acute distress  Appearance: She is not diaphoretic  HENT:      Right Ear: There is impacted cerumen  Left Ear: There is impacted cerumen  Nose: No congestion or rhinorrhea  Mouth/Throat:      Mouth: Mucous membranes are moist       Pharynx: Oropharynx is clear  No oropharyngeal exudate or posterior oropharyngeal erythema  Eyes:      Comments: Neg nystagmus   Neck:      Thyroid: No thyromegaly or thyroid tenderness  Cardiovascular:      Rate and Rhythm: Normal rate and regular rhythm  Pulses: Normal pulses  Heart sounds: Normal heart sounds  Pulmonary:      Effort: Pulmonary effort is normal  No respiratory distress  Breath sounds: Normal breath sounds  No wheezing  Musculoskeletal:      Right lower leg: No edema  Left lower leg: No edema  Lymphadenopathy:      Cervical: No cervical adenopathy  Skin:     Coloration: Skin is not pale  Neurological:      General: No focal deficit present  Mental Status: She is alert and oriented to person, place, and time  Psychiatric:         Attention and Perception: Attention normal          Mood and Affect: Mood normal          Speech: Speech normal          Behavior: Behavior is cooperative             Recent Results (from the past 168 hour(s))   TSH, 3rd generation with Free T4 reflex    Collection Time: 05/01/22  9:13 AM   Result Value Ref Range    TSH 3RD GENERATON 0 665 0 450 - 4 500 uIU/mL   Hemoglobin A1C    Collection Time: 05/01/22  9:13 AM   Result Value Ref Range Hemoglobin A1C 5 3 Normal 3 8-5 6%; PreDiabetic 5 7-6 4%;  Diabetic >=6 5%; Glycemic control for adults with diabetes <7 0% %     mg/dl   UA (URINE) with reflex to Scope    Collection Time: 05/01/22  9:13 AM   Result Value Ref Range    Color, UA Light Yellow     Clarity, UA Clear     Specific Gravity, UA 1 007 1 003 - 1 030    pH, UA 7 0 4 5, 5 0, 5 5, 6 0, 6 5, 7 0, 7 5, 8 0    Leukocytes, UA Negative Negative    Nitrite, UA Negative Negative    Protein, UA Negative Negative mg/dl    Glucose, UA Negative Negative mg/dl    Ketones, UA Negative Negative mg/dl    Urobilinogen, UA <2 0 <2 0 mg/dl mg/dl    Bilirubin, UA Negative Negative    Blood, UA Negative Negative

## 2022-05-03 NOTE — ASSESSMENT & PLAN NOTE
-s/p HANH to RCA  -tolerating DAPT    -continue atorvastatin, lisinopril, metoprolol and lisinopril  -she has been asymptomatic  -continue follow up with Cardio

## 2022-05-03 NOTE — ASSESSMENT & PLAN NOTE
-home bp controlled  -adheres to low sodium diet  -continue amlodipine 10mg daily, lisinopril 20mg daily and metoprolol 25mg q12  -monitor BMP

## 2022-05-20 ENCOUNTER — TELEPHONE (OUTPATIENT)
Dept: HEMATOLOGY ONCOLOGY | Facility: CLINIC | Age: 77
End: 2022-05-20

## 2022-05-23 ENCOUNTER — TELEPHONE (OUTPATIENT)
Dept: HEMATOLOGY ONCOLOGY | Facility: CLINIC | Age: 77
End: 2022-05-23

## 2022-05-23 NOTE — TELEPHONE ENCOUNTER
Appointment Cancellation Or Reschedule     Person calling in Patient    Provider [facility] Visit Date and Time 5/24 8:30AM   Office Visit Location [place]   Did patient want to reschedule their office appointment? If so, when was it scheduled to? Yes, 8/4 10AM   Is this patient calling to reschedule an infusion appointment? No   When is their next infusion appointment? No   Is this patient a Chemo patient? No   Reason for Cancellation or Reschedule Schedule conflict     If the patient is a treatment patient, please route this to the office nurse  If the patient is not on treatment, please route to the office MA

## 2022-05-24 ENCOUNTER — TELEPHONE (OUTPATIENT)
Dept: CARDIOLOGY CLINIC | Facility: CLINIC | Age: 77
End: 2022-05-24

## 2022-06-14 DIAGNOSIS — I21.3 STEMI (ST ELEVATION MYOCARDIAL INFARCTION) (HCC): ICD-10-CM

## 2022-07-25 DIAGNOSIS — I21.3 STEMI (ST ELEVATION MYOCARDIAL INFARCTION) (HCC): ICD-10-CM

## 2022-07-25 RX ORDER — ATORVASTATIN CALCIUM 40 MG/1
40 TABLET, FILM COATED ORAL EVERY EVENING
Qty: 90 TABLET | Refills: 1 | Status: SHIPPED | OUTPATIENT
Start: 2022-07-25

## 2022-07-31 ENCOUNTER — APPOINTMENT (OUTPATIENT)
Dept: LAB | Age: 77
End: 2022-07-31
Payer: COMMERCIAL

## 2022-07-31 DIAGNOSIS — D47.3 ESSENTIAL THROMBOCYTHEMIA (HCC): ICD-10-CM

## 2022-07-31 LAB
ALBUMIN SERPL BCP-MCNC: 3.6 G/DL (ref 3.5–5)
ALP SERPL-CCNC: 100 U/L (ref 46–116)
ALT SERPL W P-5'-P-CCNC: 22 U/L (ref 12–78)
ANION GAP SERPL CALCULATED.3IONS-SCNC: 5 MMOL/L (ref 4–13)
AST SERPL W P-5'-P-CCNC: 18 U/L (ref 5–45)
BASOPHILS # BLD AUTO: 0.08 THOUSANDS/ΜL (ref 0–0.1)
BASOPHILS NFR BLD AUTO: 1 % (ref 0–1)
BILIRUB SERPL-MCNC: 0.66 MG/DL (ref 0.2–1)
BUN SERPL-MCNC: 13 MG/DL (ref 5–25)
CALCIUM SERPL-MCNC: 9.3 MG/DL (ref 8.3–10.1)
CHLORIDE SERPL-SCNC: 109 MMOL/L (ref 96–108)
CO2 SERPL-SCNC: 25 MMOL/L (ref 21–32)
CREAT SERPL-MCNC: 0.72 MG/DL (ref 0.6–1.3)
EOSINOPHIL # BLD AUTO: 0.19 THOUSAND/ΜL (ref 0–0.61)
EOSINOPHIL NFR BLD AUTO: 3 % (ref 0–6)
ERYTHROCYTE [DISTWIDTH] IN BLOOD BY AUTOMATED COUNT: 14.1 % (ref 11.6–15.1)
GFR SERPL CREATININE-BSD FRML MDRD: 82 ML/MIN/1.73SQ M
GLUCOSE P FAST SERPL-MCNC: 98 MG/DL (ref 65–99)
HCT VFR BLD AUTO: 43.6 % (ref 34.8–46.1)
HGB BLD-MCNC: 13.8 G/DL (ref 11.5–15.4)
IMM GRANULOCYTES # BLD AUTO: 0.03 THOUSAND/UL (ref 0–0.2)
IMM GRANULOCYTES NFR BLD AUTO: 0 % (ref 0–2)
LYMPHOCYTES # BLD AUTO: 1.27 THOUSANDS/ΜL (ref 0.6–4.47)
LYMPHOCYTES NFR BLD AUTO: 17 % (ref 14–44)
MCH RBC QN AUTO: 32.3 PG (ref 26.8–34.3)
MCHC RBC AUTO-ENTMCNC: 31.7 G/DL (ref 31.4–37.4)
MCV RBC AUTO: 102 FL (ref 82–98)
MONOCYTES # BLD AUTO: 0.57 THOUSAND/ΜL (ref 0.17–1.22)
MONOCYTES NFR BLD AUTO: 8 % (ref 4–12)
NEUTROPHILS # BLD AUTO: 5.37 THOUSANDS/ΜL (ref 1.85–7.62)
NEUTS SEG NFR BLD AUTO: 71 % (ref 43–75)
NRBC BLD AUTO-RTO: 0 /100 WBCS
PLATELET # BLD AUTO: 505 THOUSANDS/UL (ref 149–390)
PMV BLD AUTO: 9 FL (ref 8.9–12.7)
POTASSIUM SERPL-SCNC: 4 MMOL/L (ref 3.5–5.3)
PROT SERPL-MCNC: 7.1 G/DL (ref 6.4–8.4)
RBC # BLD AUTO: 4.27 MILLION/UL (ref 3.81–5.12)
SODIUM SERPL-SCNC: 139 MMOL/L (ref 135–147)
WBC # BLD AUTO: 7.51 THOUSAND/UL (ref 4.31–10.16)

## 2022-07-31 PROCEDURE — 80053 COMPREHEN METABOLIC PANEL: CPT

## 2022-07-31 PROCEDURE — 85025 COMPLETE CBC W/AUTO DIFF WBC: CPT

## 2022-07-31 PROCEDURE — 36415 COLL VENOUS BLD VENIPUNCTURE: CPT

## 2022-08-30 DIAGNOSIS — I10 BENIGN ESSENTIAL HYPERTENSION: ICD-10-CM

## 2022-08-30 DIAGNOSIS — I10 BENIGN HYPERTENSION: ICD-10-CM

## 2022-08-30 RX ORDER — LISINOPRIL 20 MG/1
20 TABLET ORAL DAILY
Qty: 90 TABLET | Refills: 1 | Status: SHIPPED | OUTPATIENT
Start: 2022-08-30

## 2022-08-30 RX ORDER — AMLODIPINE BESYLATE 5 MG/1
10 TABLET ORAL DAILY
Qty: 180 TABLET | Refills: 1 | Status: SHIPPED | OUTPATIENT
Start: 2022-08-30

## 2022-09-06 DIAGNOSIS — E03.9 ACQUIRED HYPOTHYROIDISM: ICD-10-CM

## 2022-09-06 DIAGNOSIS — I21.3 STEMI (ST ELEVATION MYOCARDIAL INFARCTION) (HCC): ICD-10-CM

## 2022-09-06 RX ORDER — LEVOTHYROXINE SODIUM 112 UG/1
112 TABLET ORAL DAILY
Qty: 90 TABLET | Refills: 1 | Status: SHIPPED | OUTPATIENT
Start: 2022-09-06

## 2022-09-12 ENCOUNTER — HOSPITAL ENCOUNTER (OUTPATIENT)
Dept: NON INVASIVE DIAGNOSTICS | Facility: CLINIC | Age: 77
Discharge: HOME | End: 2022-09-12
Payer: COMMERCIAL

## 2022-09-12 VITALS
HEIGHT: 63 IN | WEIGHT: 166 LBS | BODY MASS INDEX: 29.41 KG/M2 | DIASTOLIC BLOOD PRESSURE: 86 MMHG | HEART RATE: 70 BPM | SYSTOLIC BLOOD PRESSURE: 126 MMHG

## 2022-09-12 DIAGNOSIS — I21.11 STEMI INVOLVING RIGHT CORONARY ARTERY (HCC): ICD-10-CM

## 2022-09-12 DIAGNOSIS — E78.5 HYPERLIPIDEMIA, UNSPECIFIED HYPERLIPIDEMIA TYPE: ICD-10-CM

## 2022-09-12 DIAGNOSIS — I10 ESSENTIAL (PRIMARY) HYPERTENSION: ICD-10-CM

## 2022-09-12 DIAGNOSIS — Z95.5 S/P DRUG ELUTING CORONARY STENT PLACEMENT: ICD-10-CM

## 2022-09-12 LAB
AORTIC ROOT: 3.5 CM
APICAL FOUR CHAMBER EJECTION FRACTION: 65 %
ASCENDING AORTA: 3.3 CM
E WAVE DECELERATION TIME: 224 MS
FRACTIONAL SHORTENING: 48 % (ref 28–44)
INTERVENTRICULAR SEPTUM IN DIASTOLE (PARASTERNAL SHORT AXIS VIEW): 1.2 CM
INTERVENTRICULAR SEPTUM: 1.2 CM (ref 0.6–1.1)
LAAS-AP2: 15.2 CM2
LAAS-AP4: 13.7 CM2
LEFT ATRIUM SIZE: 3 CM
LEFT INTERNAL DIMENSION IN SYSTOLE: 2.3 CM (ref 2.1–4)
LEFT VENTRICULAR INTERNAL DIMENSION IN DIASTOLE: 4.4 CM (ref 3.5–6)
LEFT VENTRICULAR POSTERIOR WALL IN END DIASTOLE: 1 CM
LEFT VENTRICULAR STROKE VOLUME: 70 ML
LVSV (TEICH): 70 ML
MV E'TISSUE VEL-SEP: 6 CM/S
MV PEAK A VEL: 1.05 M/S
MV PEAK E VEL: 58 CM/S
MV STENOSIS PRESSURE HALF TIME: 65 MS
MV VALVE AREA P 1/2 METHOD: 3.38 CM2
RA PRESSURE ESTIMATED: 3 MMHG
RIGHT ATRIAL 2D VOLUME: 22 ML
RIGHT ATRIUM AREA SYSTOLE A4C: 11.1 CM2
RIGHT VENTRICLE ID DIMENSION: 2.9 CM
SL CV LEFT ATRIUM LENGTH A2C: 5.4 CM
SL CV LV EF: 60
SL CV PED ECHO LEFT VENTRICLE DIASTOLIC VOLUME (MOD BIPLANE) 2D: 88 ML
SL CV PED ECHO LEFT VENTRICLE SYSTOLIC VOLUME (MOD BIPLANE) 2D: 17 ML

## 2022-09-12 PROCEDURE — 93306 TTE W/DOPPLER COMPLETE: CPT | Performed by: INTERNAL MEDICINE

## 2022-09-12 PROCEDURE — 93306 TTE W/DOPPLER COMPLETE: CPT

## 2022-09-13 ENCOUNTER — OFFICE VISIT (OUTPATIENT)
Dept: HEMATOLOGY ONCOLOGY | Facility: CLINIC | Age: 77
End: 2022-09-13
Payer: COMMERCIAL

## 2022-09-13 VITALS
SYSTOLIC BLOOD PRESSURE: 128 MMHG | HEIGHT: 63 IN | HEART RATE: 71 BPM | OXYGEN SATURATION: 96 % | TEMPERATURE: 97.2 F | RESPIRATION RATE: 17 BRPM | DIASTOLIC BLOOD PRESSURE: 80 MMHG | WEIGHT: 173 LBS | BODY MASS INDEX: 30.65 KG/M2

## 2022-09-13 DIAGNOSIS — D47.3 ESSENTIAL THROMBOCYTHEMIA (HCC): Primary | ICD-10-CM

## 2022-09-13 PROCEDURE — 3079F DIAST BP 80-89 MM HG: CPT | Performed by: PHYSICIAN ASSISTANT

## 2022-09-13 PROCEDURE — 1160F RVW MEDS BY RX/DR IN RCRD: CPT | Performed by: PHYSICIAN ASSISTANT

## 2022-09-13 PROCEDURE — 99214 OFFICE O/P EST MOD 30 MIN: CPT | Performed by: PHYSICIAN ASSISTANT

## 2022-09-13 PROCEDURE — 3074F SYST BP LT 130 MM HG: CPT | Performed by: PHYSICIAN ASSISTANT

## 2022-09-13 NOTE — PROGRESS NOTES
Hematology/Oncology Outpatient Follow- up Note  Vikash Austin 78 y o  female MRN: @ Encounter: [id number]        Date:  9/13/2022      Assessment / Plan:    53-year-old  female with essential thrombocythemia positive for JAK2 mutation currently on hydroxyurea 500 mg p o  every other day since 02/2020 with good control of the platelet count in the range of 500,000     Continue aspirin 81 mg p o  every other day      Follow-up in 9 months with CBC, CMP              HPI:    53-year-old  female who was seen initially 6/2017 regarding persistent thrombocytosis going back to at least March 2015  May 2017 WBC 9 3, hemoglobin 15 1, MCV 91, platelets 678,000, 37% neutrophils, 19% lymphocytes, 9% monocytes, 2% eosinophils  November 2016 WBC 9 6, hemoglobin 15 1, platelets 199,683   June 2016 platelets 503,333   March 2016 platelets 906,871   March 2015 platelets 082,217     She has a history of high blood pressure, hypothyroidism,  cholecystectomy in March 2016   She is a smoker - used to smoke half pack of cigarettes every day for 40 years quit in 2014   She does not drink alcohol   History negative for blood disorders of cancer   FADIA, rheumatoid factor normal   Ultrasound of the abdomen did not identify hepatosplenomegaly   DEAN 2 mutation present       Hydrea 500mg po every other day initiated 2/2020     Aspirin 81 mg p o  every other day    Interval History:    STEMI 1/31/22  Easy bruising    Otherwise feeling well    7/31/22-  hemoglobin 13 8, , white blood cell count 7 51, normal differential, platelets 215  CMP normal and stable    Test Results:        Labs:   Lab Results   Component Value Date    HGB 13 8 07/31/2022    HCT 43 6 07/31/2022     (H) 07/31/2022     (H) 07/31/2022    WBC 7 51 07/31/2022    NRBC 0 07/31/2022     Lab Results   Component Value Date     (L) 12/29/2015    K 4 0 07/31/2022     (H) 07/31/2022    CO2 25 07/31/2022    ANIONGAP 13 12/29/2015    BUN 13 07/31/2022    CREATININE 0 72 07/31/2022    GLUCOSE 172 (H) 12/29/2015    GLUF 98 07/31/2022    CALCIUM 9 3 07/31/2022    CORRECTEDCA 10 1 01/29/2022    AST 18 07/31/2022    ALT 22 07/31/2022    ALKPHOS 100 07/31/2022    PROT 7 9 09/05/2015    BILITOT 0 47 09/05/2015    EGFR 82 07/31/2022       Imaging: Echo complete w/ contrast if indicated    Result Date: 9/12/2022  Narrative: [address]  Left Ventricle: Left ventricular cavity size is normal  Wall thickness is mildly increased  There is mild concentric hypertrophy  The left ventricular ejection fraction is 60% by visual estimation  Systolic function is normal  There is mild basal inferior hypokinesis  Diastolic function is mildly abnormal, consistent with grade I (abnormal) relaxation  Left atrial filling pressure is normal    Right Ventricle: Systolic function is normal    Aortic Valve: There is aortic sclerosis    Mitral Valve: The mitral valve has normal structure and normal function    Pericardium: There is no pericardial effusion    Prior study date: 1/29/2022  Changes noted when compared to prior study  Changes include: LVEF is slightly improved              ROS:  As mentioned in HPI & Interval History otherwise 14 point ROS negative  Allergies: No Known Allergies  Current Medications: Reviewed  PMH/FH/SH:  Reviewed      Physical Exam:    1 82 meters squared    Ht Readings from Last 3 Encounters:   09/13/22 5' 3" (1 6 m)   09/12/22 5' 3" (1 6 m)   05/03/22 5' 3" (1 6 m)        Wt Readings from Last 3 Encounters:   09/13/22 78 5 kg (173 lb)   09/12/22 75 3 kg (166 lb)   05/03/22 75 5 kg (166 lb 6 4 oz)        Temp Readings from Last 3 Encounters:   09/13/22 (!) 97 2 °F (36 2 °C)   05/03/22 98 8 °F (37 1 °C) (Tympanic)   02/14/22 (!) 97 °F (36 1 °C) (Skin)        BP Readings from Last 3 Encounters:   09/13/22 128/80   09/12/22 126/86   05/03/22 126/86           Physical Exam  Constitutional:       Appearance: She is well-developed  HENT:      Head: Normocephalic and atraumatic  Cardiovascular:      Rate and Rhythm: Normal rate and regular rhythm  Heart sounds: Normal heart sounds  Pulmonary:      Effort: Pulmonary effort is normal  No respiratory distress  Breath sounds: Normal breath sounds  Abdominal:      Palpations: Abdomen is soft  Musculoskeletal:      Cervical back: Neck supple  Skin:     General: Skin is warm and dry  Neurological:      Mental Status: She is alert     Psychiatric:         Behavior: Behavior normal          ECO    Emergency Contacts:    Extended Emergency Contact Information  Primary Emergency Contact: Miguel Angel Santiago [address] Phone: [phone]  Relation: Son

## 2022-09-20 ENCOUNTER — VBI (OUTPATIENT)
Dept: ADMINISTRATIVE | Facility: OTHER | Age: 77
End: 2022-09-20

## 2022-10-21 NOTE — PROGRESS NOTES
Cardiology Follow Up    Jayy Terry  [birth year]  [id number]  [address]  [phone]    1  Essential (primary) hypertension     2  Hyperlipidemia, unspecified hyperlipidemia type     3  S/P drug eluting coronary stent placement     4  STEMI involving right coronary artery [facility])         Interval History:  Patient is here for a follow-up visit  She was hospitalized January 2022 with an RCA STEMI  Cardiac catheterization done January 28, 2022 demonstrated a closed RCA with successful PCI and placement of a HANH  Other vessels had mild diffuse disease  Echocardiogram September 2022 demonstrated LVEF of 60% with mild inferobasal hypokinesis  Aortic sclerosis was noted  Compared to prior echo done January 2022 LV function appears improved  Lipid profile done October 2022 demonstrated total cholesterol of 124 with an HDL of 46 and a calculated LDL of 58  Patient is on atorvastatin  Patient has been well  She has had no chest pain or significant dyspnea      Patient Active Problem List   Diagnosis   • Myeloproliferative disorder [facility])   • Benign essential hypertension   • Essential thrombocythemia ([address] )   • Fatty liver   • Hypothyroidism   • Impaired fasting glucose   • Insomnia   • Osteopenia   • Prediabetes   • STEMI involving right coronary artery (HCC)   • Presence of drug-eluting stent in right coronary artery   • Vertigo, benign paroxysmal, left     Past Medical History:   Diagnosis Date   • Biliary colic     last assessed: 01/05/16   • Cholecystitis    • Disease of thyroid gland     hypo   • Hematuria     last assessed: 05/05/17   • Hypercalcemia 12/30/2019   • Hypertension    • Hypokalemia 1/22/2016   • Leukocytosis     last assessed: 04/05/16   • Obesity (BMI 30 0-34 9) 6/21/2019   • Toxic nodular goiter    • Uterine fibroid      Social History     Socioeconomic History   • Marital status:      Spouse name: Not on file   • Number of children: Not on file   • Years of education: Not on file   • Highest education level: Not on file   Occupational History   • Occupation: Retired   Tobacco Use   • Smoking status: Former Smoker     Packs/day: 0 50     Years: 40 00     Pack years: 20 00     Quit date: 1/1/2012     Years since quitting: 10 8   • Smokeless tobacco: Never Used   Substance and Sexual Activity   • Alcohol use: No   • Drug use: No   • Sexual activity: Never   Other Topics Concern   • Not on file   Social History Narrative   • Not on file     Social Determinants of Health     Financial Resource Strain: Not on file   Food Insecurity: No Food Insecurity   • Worried About [employer] in the Last Year: Never true   • Ran Out of Food in the Last Year: Never true   Transportation Needs: No Transportation Needs   • Lack of Transportation (Medical): No   • Lack of Transportation (Non-Medical): No   Physical Activity: Not on file   Stress: Not on file   Social Connections: Not on file   Intimate Partner Violence: Not on file   Housing Stability: Unknown   • Unable to Pay for Housing in the Last Year: No   • Number of Places Lived in the Last Year: Not on file   • Unstable Housing in the Last Year: No      Family History   Problem Relation Age of Onset   • Hypothyroidism Mother    • Heart disease Brother         Cardiac disorder     Past Surgical History:   Procedure Laterality Date   • CARDIAC CATHETERIZATION N/A 1/28/2022    Procedure: Cardiac pci;  Surgeon: Ania Trimble MD;  Location: [facility];   Service: Cardiology   • HYSTERECTOMY      INGRIS, with removal of both ovaries   • MS LAP,CHOLECYSTECTOMY N/A 3/9/2016    Procedure: LAPAROSCOPIC CHOLECYSTECTOMY ;  Surgeon: Blanca Dangelo MD;  Location: [facility] MAIN OR;  Service: General   • TUBAL LIGATION         Current Outpatient Medications:   •  amLODIPine (NORVASC) 5 mg tablet, Take 2 tablets (10 mg total) by mouth daily, Disp: 180 tablet, Rfl: 1  •  aspirin 81 MG tablet, Take 81 mg by mouth daily  , Disp: , Rfl:   •  atorvastatin (LIPITOR) 40 mg tablet, Take 1 tablet (40 mg total) by mouth every evening, Disp: 90 tablet, Rfl: 1  •  clopidogrel (Plavix) 75 mg tablet, Take 1 tablet (75 mg total) by mouth daily, Disp: 90 tablet, Rfl: 3  •  hydroxyurea (HYDREA) 500 mg capsule, TAKE 1 CAPSULE BY MOUTH EVERY OTHER DAY, Disp: 30 capsule, Rfl: 11  •  levothyroxine 112 mcg tablet, Take 1 tablet (112 mcg total) by mouth daily, Disp: 90 tablet, Rfl: 1  •  lisinopril (ZESTRIL) 20 mg tablet, Take 1 tablet (20 mg total) by mouth daily, Disp: 90 tablet, Rfl: 1  •  metoprolol tartrate (LOPRESSOR) 25 mg tablet, Take 1 tablet (25 mg total) by mouth every 12 (twelve) hours, Disp: 90 tablet, Rfl: 1  •  ferrous sulfate 324 (65 Fe) mg, Take 1 tablet (324 mg total) by mouth every other day (Patient not taking: Reported on 10/31/2022), Disp: 60 tablet, Rfl: 0  •  nitroglycerin (NITROSTAT) 0 4 mg SL tablet, Place 1 tablet (0 4 mg total) under the tongue every 5 (five) minutes as needed for chest pain (Patient not taking: Reported on 10/31/2022), Disp: 90 tablet, Rfl: 0  No Known Allergies    Labs:not applicable  Imaging: No results found  Review of Systems:  Review of Systems   All other systems reviewed and are negative  Physical Exam:  /68 (BP Location: Right arm, Cuff Size: Standard)   Pulse 76   Ht 5' 3" (1 6 m)   Wt 79 8 kg (176 lb)   BMI 31 18 kg/m²   Physical Exam  Vitals reviewed  Constitutional:       Appearance: She is well-developed  HENT:      Head: Normocephalic and atraumatic  Eyes:      Conjunctiva/sclera: Conjunctivae normal       Pupils: Pupils are equal, round, and reactive to light  Cardiovascular:      Rate and Rhythm: Normal rate  Heart sounds: Normal heart sounds  Pulmonary:      Effort: Pulmonary effort is normal       Breath sounds: Normal breath sounds     Musculoskeletal:      Cervical back: Normal range of motion and neck supple  Skin:     General: Skin is warm and dry  Neurological:      Mental Status: She is alert and oriented to person, place, and time  Discussion/Summary:I will continue the patient's present medical regimen  The patient appears well compensated  I have asked the patient to call if there is a problem in the interim otherwise I will see the patient in six months time

## 2022-10-24 ENCOUNTER — APPOINTMENT (OUTPATIENT)
Dept: LAB | Age: 77
End: 2022-10-24
Payer: COMMERCIAL

## 2022-10-24 DIAGNOSIS — E03.9 ACQUIRED HYPOTHYROIDISM: ICD-10-CM

## 2022-10-24 DIAGNOSIS — I10 ESSENTIAL (PRIMARY) HYPERTENSION: ICD-10-CM

## 2022-10-24 DIAGNOSIS — E78.5 HYPERLIPIDEMIA, UNSPECIFIED HYPERLIPIDEMIA TYPE: ICD-10-CM

## 2022-10-24 DIAGNOSIS — I21.11 STEMI INVOLVING RIGHT CORONARY ARTERY (HCC): ICD-10-CM

## 2022-10-24 DIAGNOSIS — Z95.5 S/P DRUG ELUTING CORONARY STENT PLACEMENT: ICD-10-CM

## 2022-10-24 LAB
ALBUMIN SERPL BCP-MCNC: 3.5 G/DL (ref 3.5–5)
ALP SERPL-CCNC: 111 U/L (ref 46–116)
ALT SERPL W P-5'-P-CCNC: 21 U/L (ref 12–78)
AST SERPL W P-5'-P-CCNC: 22 U/L (ref 5–45)
BILIRUB DIRECT SERPL-MCNC: 0.36 MG/DL (ref 0–0.2)
BILIRUB SERPL-MCNC: 0.6 MG/DL (ref 0.2–1)
CHOLEST SERPL-MCNC: 124 MG/DL
HDLC SERPL-MCNC: 46 MG/DL
LDLC SERPL CALC-MCNC: 58 MG/DL (ref 0–100)
LDLC SERPL DIRECT ASSAY-MCNC: 68 MG/DL (ref 0–100)
NONHDLC SERPL-MCNC: 78 MG/DL
PROT SERPL-MCNC: 7.6 G/DL (ref 6.4–8.4)
TRIGL SERPL-MCNC: 101 MG/DL
TSH SERPL DL<=0.05 MIU/L-ACNC: 1.07 UIU/ML (ref 0.45–4.5)

## 2022-10-24 PROCEDURE — 84443 ASSAY THYROID STIM HORMONE: CPT

## 2022-10-24 PROCEDURE — 83721 ASSAY OF BLOOD LIPOPROTEIN: CPT

## 2022-10-24 PROCEDURE — 36415 COLL VENOUS BLD VENIPUNCTURE: CPT

## 2022-10-24 PROCEDURE — 80061 LIPID PANEL: CPT

## 2022-10-24 PROCEDURE — 80076 HEPATIC FUNCTION PANEL: CPT

## 2022-10-31 ENCOUNTER — OFFICE VISIT (OUTPATIENT)
Dept: CARDIOLOGY CLINIC | Facility: CLINIC | Age: 77
End: 2022-10-31

## 2022-10-31 VITALS
HEART RATE: 76 BPM | DIASTOLIC BLOOD PRESSURE: 68 MMHG | SYSTOLIC BLOOD PRESSURE: 140 MMHG | WEIGHT: 176 LBS | BODY MASS INDEX: 31.18 KG/M2 | HEIGHT: 63 IN

## 2022-10-31 DIAGNOSIS — Z95.5 S/P DRUG ELUTING CORONARY STENT PLACEMENT: ICD-10-CM

## 2022-10-31 DIAGNOSIS — I21.11 STEMI INVOLVING RIGHT CORONARY ARTERY (HCC): ICD-10-CM

## 2022-10-31 DIAGNOSIS — E78.5 HYPERLIPIDEMIA, UNSPECIFIED HYPERLIPIDEMIA TYPE: ICD-10-CM

## 2022-10-31 DIAGNOSIS — I10 ESSENTIAL (PRIMARY) HYPERTENSION: Primary | ICD-10-CM

## 2022-11-08 ENCOUNTER — OFFICE VISIT (OUTPATIENT)
Dept: INTERNAL MEDICINE CLINIC | Facility: CLINIC | Age: 77
End: 2022-11-08

## 2022-11-08 VITALS
TEMPERATURE: 99.5 F | OXYGEN SATURATION: 96 % | SYSTOLIC BLOOD PRESSURE: 132 MMHG | HEART RATE: 86 BPM | DIASTOLIC BLOOD PRESSURE: 78 MMHG | BODY MASS INDEX: 31.61 KG/M2 | WEIGHT: 178.4 LBS | HEIGHT: 63 IN | RESPIRATION RATE: 16 BRPM

## 2022-11-08 DIAGNOSIS — M85.80 OSTEOPENIA, UNSPECIFIED LOCATION: ICD-10-CM

## 2022-11-08 DIAGNOSIS — D47.3 ESSENTIAL THROMBOCYTHEMIA (HCC): ICD-10-CM

## 2022-11-08 DIAGNOSIS — I21.11 STEMI INVOLVING RIGHT CORONARY ARTERY (HCC): ICD-10-CM

## 2022-11-08 DIAGNOSIS — K76.0 FATTY LIVER: ICD-10-CM

## 2022-11-08 DIAGNOSIS — I10 BENIGN ESSENTIAL HYPERTENSION: ICD-10-CM

## 2022-11-08 DIAGNOSIS — Z23 ENCOUNTER FOR IMMUNIZATION: ICD-10-CM

## 2022-11-08 DIAGNOSIS — M54.50 ACUTE RIGHT-SIDED LOW BACK PAIN WITHOUT SCIATICA: Primary | ICD-10-CM

## 2022-11-08 DIAGNOSIS — Z00.00 MEDICARE ANNUAL WELLNESS VISIT, SUBSEQUENT: ICD-10-CM

## 2022-11-08 DIAGNOSIS — Z12.31 ENCOUNTER FOR SCREENING MAMMOGRAM FOR BREAST CANCER: ICD-10-CM

## 2022-11-08 DIAGNOSIS — E03.9 ACQUIRED HYPOTHYROIDISM: ICD-10-CM

## 2022-11-08 DIAGNOSIS — Z95.5 PRESENCE OF DRUG-ELUTING STENT IN RIGHT CORONARY ARTERY: ICD-10-CM

## 2022-11-08 NOTE — ASSESSMENT & PLAN NOTE
-she has continued to decline surveillance DXA  -recommend calcium and vitamin D and weight bearing exercises

## 2022-11-08 NOTE — PATIENT INSTRUCTIONS
Medicare Preventive Visit Patient Instructions  Thank you for completing your Welcome to Medicare Visit or Medicare Annual Wellness Visit today  Your next wellness visit will be due in one year (11/9/2023)  The screening/preventive services that you may require over the next 5-10 years are detailed below  Some tests may not apply to you based off risk factors and/or age  Screening tests ordered at today's visit but not completed yet may show as past due  Also, please note that scanned in results may not display below  Preventive Screenings:  Service Recommendations Previous Testing/Comments   Colorectal Cancer Screening  * Colonoscopy    * Fecal Occult Blood Test (FOBT)/Fecal Immunochemical Test (FIT)  * Fecal DNA/Cologuard Test  * Flexible Sigmoidoscopy Age: 39-70 years old   Colonoscopy: every 10 years (may be performed more frequently if at higher risk)  OR  FOBT/FIT: every 1 year  OR  Cologuard: every 3 years  OR  Sigmoidoscopy: every 5 years  Screening may be recommended earlier than age 39 if at higher risk for colorectal cancer  Also, an individualized decision between you and your healthcare provider will decide whether screening between the ages of 74-80 would be appropriate  Colonoscopy: 01/16/2013  FOBT/FIT: Not on file  Cologuard: Not on file  Sigmoidoscopy: Not on file          Breast Cancer Screening Age: 36 years old  Frequency: every 1-2 years  Not required if history of left and right mastectomy Mammogram: 01/24/2020        Cervical Cancer Screening Between the ages of 21-29, pap smear recommended once every 3 years  Between the ages of 33-67, can perform pap smear with HPV co-testing every 5 years     Recommendations may differ for women with a history of total hysterectomy, cervical cancer, or abnormal pap smears in past  Pap Smear: Not on file    Screening Not Indicated   Hepatitis C Screening Once for adults born between [facility]  More frequently in patients at high risk for Hepatitis C Hep C Antibody: 10/14/2018    Screening Current   Diabetes Screening 1-2 times per year if you're at risk for diabetes or have pre-diabetes Fasting glucose: 98 mg/dL (7/31/2022)  A1C: 5 3 % (5/1/2022)  Screening Current   Cholesterol Screening Once every 5 years if you don't have a lipid disorder  May order more often based on risk factors  Lipid panel: 10/24/2022    Screening Not Indicated  History Lipid Disorder     Other Preventive Screenings Covered by Medicare:  1  Abdominal Aortic Aneurysm (AAA) Screening: covered once if your at risk  You're considered to be at risk if you have a family history of AAA  2  Lung Cancer Screening: covers low dose CT scan once per year if you meet all of the following conditions: (1) Age 50-69; (2) No signs or symptoms of lung cancer; (3) Current smoker or have quit smoking within the last 15 years; (4) You have a tobacco smoking history of at least 20 pack years (packs per day multiplied by number of years you smoked); (5) You get a written order from a healthcare provider  3  Glaucoma Screening: covered annually if you're considered high risk: (1) You have diabetes OR (2) Family history of glaucoma OR (3)  aged 48 and older OR (3)  American aged 72 and older  3  Osteoporosis Screening: covered every 2 years if you meet one of the following conditions: (1) You're estrogen deficient and at risk for osteoporosis based off medical history and other findings; (2) Have a vertebral abnormality; (3) On glucocorticoid therapy for more than 3 months; (4) Have primary hyperparathyroidism; (5) On osteoporosis medications and need to assess response to drug therapy  · Last bone density test (DXA Scan): Not on file  5  HIV Screening: covered annually if you're between the age of 12-76  Also covered annually if you are younger than 13 and older than 72 with risk factors for HIV infection   For pregnant patients, it is covered up to 3 times per pregnancy  Immunizations:  Immunization Recommendations   Influenza Vaccine Annual influenza vaccination during flu season is recommended for all persons aged >= 6 months who do not have contraindications   Pneumococcal Vaccine   * Pneumococcal conjugate vaccine = PCV13 (Prevnar 13), PCV15 (Vaxneuvance), PCV20 (Prevnar 20)  * Pneumococcal polysaccharide vaccine = PPSV23 (Pneumovax) Adults 25-60 years old: 1-3 doses may be recommended based on certain risk factors  Adults 72 years old: 1-2 doses may be recommended based off what pneumonia vaccine you previously received   Hepatitis B Vaccine 3 dose series if at intermediate or high risk (ex: diabetes, end stage renal disease, liver disease)   Tetanus (Td) Vaccine - COST NOT COVERED BY MEDICARE PART B Following completion of primary series, a booster dose should be given every 10 years to maintain immunity against tetanus  Td may also be given as tetanus wound prophylaxis  Tdap Vaccine - COST NOT COVERED BY MEDICARE PART B Recommended at least once for all adults  For pregnant patients, recommended with each pregnancy  Shingles Vaccine (Shingrix) - COST NOT COVERED BY MEDICARE PART B  2 shot series recommended in those aged 48 and above     Health Maintenance Due:      Topic Date Due   • Lung Cancer Screening  Never done   • Colorectal Cancer Screening  01/16/2014   • Breast Cancer Screening: Mammogram  01/24/2021   • Hepatitis C Screening  Completed     Immunizations Due:      Topic Date Due   • COVID-19 Vaccine (2 - Booster for Esther series) 05/31/2021   • Influenza Vaccine (1) 09/01/2022     Advance Directives   What are advance directives? Advance directives are legal documents that state your wishes and plans for medical care  These plans are made ahead of time in case you lose your ability to make decisions for yourself  Advance directives can apply to any medical decision, such as the treatments you want, and if you want to donate organs     What are the types of advance directives? There are many types of advance directives, and each state has rules about how to use them  You may choose a combination of any of the following:  · Living will: This is a written record of the treatment you want  You can also choose which treatments you do not want, which to limit, and which to stop at a certain time  This includes surgery, medicine, IV fluid, and tube feedings  · Durable power of  for healthcare [facility]): This is a written record that states who you want to make healthcare choices for you when you are unable to make them for yourself  This person, called a proxy, is usually a family member or a friend  You may choose more than 1 proxy  · Do not resuscitate (DNR) order:  A DNR order is used in case your heart stops beating or you stop breathing  It is a request not to have certain forms of treatment, such as CPR  A DNR order may be included in other types of advance directives  · Medical directive: This covers the care that you want if you are in a coma, near death, or unable to make decisions for yourself  You can list the treatments you want for each condition  Treatment may include pain medicine, surgery, blood transfusions, dialysis, IV or tube feedings, and a ventilator (breathing machine)  · Values history: This document has questions about your views, beliefs, and how you feel and think about life  This information can help others choose the care that you would choose  Why are advance directives important? An advance directive helps you control your care  Although spoken wishes may be used, it is better to have your wishes written down  Spoken wishes can be misunderstood, or not followed  Treatments may be given even if you do not want them  An advance directive may make it easier for your family to make difficult choices about your care  Urinary Incontinence   Urinary incontinence (UI)  is when you lose control of your bladder   UI develops because your bladder cannot store or empty urine properly  The 3 most common types of UI are stress incontinence, urge incontinence, or both  Medicines:   · May be given to help strengthen your bladder control  Report any side effects of medication to your healthcare provider  Do pelvic muscle exercises often:  Your pelvic muscles help you stop urinating  Squeeze these muscles tight for 5 seconds, then relax for 5 seconds  Gradually work up to squeezing for 10 seconds  Do 3 sets of 15 repetitions a day, or as directed  This will help strengthen your pelvic muscles and improve bladder control  Train your bladder:  Go to the bathroom at set times, such as every 2 hours, even if you do not feel the urge to go  You can also try to hold your urine when you feel the urge to go  For example, hold your urine for 5 minutes when you feel the urge to go  As that becomes easier, hold your urine for 10 minutes  Self-care:   · Keep a UI record  Write down how often you leak urine and how much you leak  Make a note of what you were doing when you leaked urine  · Drink liquids as directed  You may need to limit the amount of liquid you drink to help control your urine leakage  Do not drink any liquid right before you go to bed  Limit or do not have drinks that contain caffeine or alcohol  · Prevent constipation  Eat a variety of high-fiber foods  Good examples are high-fiber cereals, beans, vegetables, and whole-grain breads  Walking is the best way to trigger your intestines to have a bowel movement  · Exercise regularly and maintain a healthy weight  Weight loss and exercise will decrease pressure on your bladder and help you control your leakage  · Use a catheter as directed  to help empty your bladder  A catheter is a tiny, plastic tube that is put into your bladder to drain your urine  · Go to behavior therapy as directed    Behavior therapy may be used to help you learn to control your urge to urinate  Weight Management   Why it is important to manage your weight:  Being overweight increases your risk of health conditions such as heart disease, high blood pressure, type 2 diabetes, and certain types of cancer  It can also increase your risk for osteoarthritis, sleep apnea, and other respiratory problems  Aim for a slow, steady weight loss  Even a small amount of weight loss can lower your risk of health problems  How to lose weight safely:  A safe and healthy way to lose weight is to eat fewer calories and get regular exercise  You can lose up about 1 pound a week by decreasing the number of calories you eat by 500 calories each day  Healthy meal plan for weight management:  A healthy meal plan includes a variety of foods, contains fewer calories, and helps you stay healthy  A healthy meal plan includes the following:  · Eat whole-grain foods more often  A healthy meal plan should contain fiber  Fiber is the part of grains, fruits, and vegetables that is not broken down by your body  Whole-grain foods are healthy and provide extra fiber in your diet  Some examples of whole-grain foods are whole-wheat breads and pastas, oatmeal, brown rice, and bulgur  · Eat a variety of vegetables every day  Include dark, leafy greens such as spinach, kale, zoila greens, and mustard greens  Eat yellow and orange vegetables such as carrots, sweet potatoes, and winter squash  · Eat a variety of fruits every day  Choose fresh or canned fruit (canned in its own juice or light syrup) instead of juice  Fruit juice has very little or no fiber  · Eat low-fat dairy foods  Drink fat-free (skim) milk or 1% milk  Eat fat-free yogurt and low-fat cottage cheese  Try low-fat cheeses such as mozzarella and other reduced-fat cheeses  · Choose meat and other protein foods that are low in fat  Choose beans or other legumes such as split peas or lentils   Choose fish, skinless poultry (chicken or turkey), or lean cuts of red meat (beef or pork)  Before you cook meat or poultry, cut off any visible fat  · Use less fat and oil  Try baking foods instead of frying them  Add less fat, such as margarine, sour cream, regular salad dressing and mayonnaise to foods  Eat fewer high-fat foods  Some examples of high-fat foods include french fries, doughnuts, ice cream, and cakes  · Eat fewer sweets  Limit foods and drinks that are high in sugar  This includes candy, cookies, regular soda, and sweetened drinks  Exercise:  Exercise at least 30 minutes per day on most days of the week  Some examples of exercise include walking, biking, dancing, and swimming  You can also fit in more physical activity by taking the stairs instead of the elevator or parking farther away from stores  Ask your healthcare provider about the best exercise plan for you  © Copyright [employer] 2018 Information is for End User's use only and may not be sold, redistributed or otherwise used for commercial purposes  All illustrations and images included in CareNotes® are the copyrighted property of [employer]  or [facility] Preventive Visit Patient Instructions  Thank you for completing your Welcome to Medicare Visit or Medicare Annual Wellness Visit today  Your next wellness visit will be due in one year (11/9/2023)  The screening/preventive services that you may require over the next 5-10 years are detailed below  Some tests may not apply to you based off risk factors and/or age  Screening tests ordered at today's visit but not completed yet may show as past due  Also, please note that scanned in results may not display below    Preventive Screenings:  Service Recommendations Previous Testing/Comments   Colorectal Cancer Screening  * Colonoscopy    * Fecal Occult Blood Test (FOBT)/Fecal Immunochemical Test (FIT)  * Fecal DNA/Cologuard Test  * Flexible Sigmoidoscopy Age: 39-70 years old   Colonoscopy: every 10 years (may be performed more frequently if at higher risk)  OR  FOBT/FIT: every 1 year  OR  Cologuard: every 3 years  OR  Sigmoidoscopy: every 5 years  Screening may be recommended earlier than age 39 if at higher risk for colorectal cancer  Also, an individualized decision between you and your healthcare provider will decide whether screening between the ages of 74-80 would be appropriate  Colonoscopy: 01/16/2013  FOBT/FIT: Not on file  Cologuard: Not on file  Sigmoidoscopy: Not on file          Breast Cancer Screening Age: 36 years old  Frequency: every 1-2 years  Not required if history of left and right mastectomy Mammogram: 01/24/2020        Cervical Cancer Screening Between the ages of 21-29, pap smear recommended once every 3 years  Between the ages of 33-67, can perform pap smear with HPV co-testing every 5 years  Recommendations may differ for women with a history of total hysterectomy, cervical cancer, or abnormal pap smears in past  Pap Smear: Not on file    Screening Not Indicated   Hepatitis C Screening Once for adults born between 1945 and 1965  More frequently in patients at high risk for Hepatitis C Hep C Antibody: 10/14/2018    Screening Current   Diabetes Screening 1-2 times per year if you're at risk for diabetes or have pre-diabetes Fasting glucose: 98 mg/dL (7/31/2022)  A1C: 5 3 % (5/1/2022)  Screening Current   Cholesterol Screening Once every 5 years if you don't have a lipid disorder  May order more often based on risk factors  Lipid panel: 10/24/2022    Screening Not Indicated  History Lipid Disorder     Other Preventive Screenings Covered by Medicare:  6  Abdominal Aortic Aneurysm (AAA) Screening: covered once if your at risk  You're considered to be at risk if you have a family history of AAA    7  Lung Cancer Screening: covers low dose CT scan once per year if you meet all of the following conditions: (1) Age 50-69; (2) No signs or symptoms of lung cancer; (3) Current smoker or have quit smoking within the last 15 years; (4) You have a tobacco smoking history of at least 20 pack years (packs per day multiplied by number of years you smoked); (5) You get a written order from a healthcare provider  8  Glaucoma Screening: covered annually if you're considered high risk: (1) You have diabetes OR (2) Family history of glaucoma OR (3)  aged 48 and older OR (3)  American aged 72 and older  5  Osteoporosis Screening: covered every 2 years if you meet one of the following conditions: (1) You're estrogen deficient and at risk for osteoporosis based off medical history and other findings; (2) Have a vertebral abnormality; (3) On glucocorticoid therapy for more than 3 months; (4) Have primary hyperparathyroidism; (5) On osteoporosis medications and need to assess response to drug therapy  · Last bone density test (DXA Scan): Not on file  10  HIV Screening: covered annually if you're between the age of 12-76  Also covered annually if you are younger than 13 and older than 72 with risk factors for HIV infection  For pregnant patients, it is covered up to 3 times per pregnancy      Immunizations:  Immunization Recommendations   Influenza Vaccine Annual influenza vaccination during flu season is recommended for all persons aged >= 6 months who do not have contraindications   Pneumococcal Vaccine   * Pneumococcal conjugate vaccine = PCV13 (Prevnar 13), PCV15 (Vaxneuvance), PCV20 (Prevnar 20)  * Pneumococcal polysaccharide vaccine = PPSV23 (Pneumovax) Adults 25-60 years old: 1-3 doses may be recommended based on certain risk factors  Adults 72 years old: 1-2 doses may be recommended based off what pneumonia vaccine you previously received   Hepatitis B Vaccine 3 dose series if at intermediate or high risk (ex: diabetes, end stage renal disease, liver disease)   Tetanus (Td) Vaccine - COST NOT COVERED BY MEDICARE PART B Following completion of primary series, a booster dose should be given every 10 years to maintain immunity against tetanus  Td may also be given as tetanus wound prophylaxis  Tdap Vaccine - COST NOT COVERED BY MEDICARE PART B Recommended at least once for all adults  For pregnant patients, recommended with each pregnancy  Shingles Vaccine (Shingrix) - COST NOT COVERED BY MEDICARE PART B  2 shot series recommended in those aged 48 and above     Health Maintenance Due:      Topic Date Due   • Lung Cancer Screening  Never done   • Colorectal Cancer Screening  01/16/2014   • Breast Cancer Screening: Mammogram  01/24/2021   • Hepatitis C Screening  Completed     Immunizations Due:      Topic Date Due   • COVID-19 Vaccine (2 - Booster for Esther series) 05/31/2021   • Influenza Vaccine (1) 09/01/2022     Advance Directives   What are advance directives? Advance directives are legal documents that state your wishes and plans for medical care  These plans are made ahead of time in case you lose your ability to make decisions for yourself  Advance directives can apply to any medical decision, such as the treatments you want, and if you want to donate organs  What are the types of advance directives? There are many types of advance directives, and each state has rules about how to use them  You may choose a combination of any of the following:  · Living will: This is a written record of the treatment you want  You can also choose which treatments you do not want, which to limit, and which to stop at a certain time  This includes surgery, medicine, IV fluid, and tube feedings  · Durable power of  for healthcare [facility]): This is a written record that states who you want to make healthcare choices for you when you are unable to make them for yourself  This person, called a proxy, is usually a family member or a friend  You may choose more than 1 proxy  · Do not resuscitate (DNR) order:  A DNR order is used in case your heart stops beating or you stop breathing   It is a request not to have certain forms of treatment, such as CPR  A DNR order may be included in other types of advance directives  · Medical directive: This covers the care that you want if you are in a coma, near death, or unable to make decisions for yourself  You can list the treatments you want for each condition  Treatment may include pain medicine, surgery, blood transfusions, dialysis, IV or tube feedings, and a ventilator (breathing machine)  · Values history: This document has questions about your views, beliefs, and how you feel and think about life  This information can help others choose the care that you would choose  Why are advance directives important? An advance directive helps you control your care  Although spoken wishes may be used, it is better to have your wishes written down  Spoken wishes can be misunderstood, or not followed  Treatments may be given even if you do not want them  An advance directive may make it easier for your family to make difficult choices about your care  Urinary Incontinence   Urinary incontinence (UI)  is when you lose control of your bladder  UI develops because your bladder cannot store or empty urine properly  The 3 most common types of UI are stress incontinence, urge incontinence, or both  Medicines:   · May be given to help strengthen your bladder control  Report any side effects of medication to your healthcare provider  Do pelvic muscle exercises often:  Your pelvic muscles help you stop urinating  Squeeze these muscles tight for 5 seconds, then relax for 5 seconds  Gradually work up to squeezing for 10 seconds  Do 3 sets of 15 repetitions a day, or as directed  This will help strengthen your pelvic muscles and improve bladder control  Train your bladder:  Go to the bathroom at set times, such as every 2 hours, even if you do not feel the urge to go  You can also try to hold your urine when you feel the urge to go   For example, hold your urine for 5 minutes when you feel the urge to go  As that becomes easier, hold your urine for 10 minutes  Self-care:   · Keep a UI record  Write down how often you leak urine and how much you leak  Make a note of what you were doing when you leaked urine  · Drink liquids as directed  You may need to limit the amount of liquid you drink to help control your urine leakage  Do not drink any liquid right before you go to bed  Limit or do not have drinks that contain caffeine or alcohol  · Prevent constipation  Eat a variety of high-fiber foods  Good examples are high-fiber cereals, beans, vegetables, and whole-grain breads  Walking is the best way to trigger your intestines to have a bowel movement  · Exercise regularly and maintain a healthy weight  Weight loss and exercise will decrease pressure on your bladder and help you control your leakage  · Use a catheter as directed  to help empty your bladder  A catheter is a tiny, plastic tube that is put into your bladder to drain your urine  · Go to behavior therapy as directed  Behavior therapy may be used to help you learn to control your urge to urinate  Weight Management   Why it is important to manage your weight:  Being overweight increases your risk of health conditions such as heart disease, high blood pressure, type 2 diabetes, and certain types of cancer  It can also increase your risk for osteoarthritis, sleep apnea, and other respiratory problems  Aim for a slow, steady weight loss  Even a small amount of weight loss can lower your risk of health problems  How to lose weight safely:  A safe and healthy way to lose weight is to eat fewer calories and get regular exercise  You can lose up about 1 pound a week by decreasing the number of calories you eat by 500 calories each day  Healthy meal plan for weight management:  A healthy meal plan includes a variety of foods, contains fewer calories, and helps you stay healthy   A healthy meal plan includes the following:  · Eat whole-grain foods more often  A healthy meal plan should contain fiber  Fiber is the part of grains, fruits, and vegetables that is not broken down by your body  Whole-grain foods are healthy and provide extra fiber in your diet  Some examples of whole-grain foods are whole-wheat breads and pastas, oatmeal, brown rice, and bulgur  · Eat a variety of vegetables every day  Include dark, leafy greens such as spinach, kale, zoila greens, and mustard greens  Eat yellow and orange vegetables such as carrots, sweet potatoes, and winter squash  · Eat a variety of fruits every day  Choose fresh or canned fruit (canned in its own juice or light syrup) instead of juice  Fruit juice has very little or no fiber  · Eat low-fat dairy foods  Drink fat-free (skim) milk or 1% milk  Eat fat-free yogurt and low-fat cottage cheese  Try low-fat cheeses such as mozzarella and other reduced-fat cheeses  · Choose meat and other protein foods that are low in fat  Choose beans or other legumes such as split peas or lentils  Choose fish, skinless poultry (chicken or turkey), or lean cuts of red meat (beef or pork)  Before you cook meat or poultry, cut off any visible fat  · Use less fat and oil  Try baking foods instead of frying them  Add less fat, such as margarine, sour cream, regular salad dressing and mayonnaise to foods  Eat fewer high-fat foods  Some examples of high-fat foods include french fries, doughnuts, ice cream, and cakes  · Eat fewer sweets  Limit foods and drinks that are high in sugar  This includes candy, cookies, regular soda, and sweetened drinks  Exercise:  Exercise at least 30 minutes per day on most days of the week  Some examples of exercise include walking, biking, dancing, and swimming  You can also fit in more physical activity by taking the stairs instead of the elevator or parking farther away from stores  Ask your healthcare provider about the best exercise plan for you        © Copyright [employer] 2018 Information is for [employer] use only and may not be sold, redistributed or otherwise used for commercial purposes   All illustrations and images included in CareNotes® are the copyrighted property of A D A M , Inc  or [address] [place], [place] [address]marbin irene

## 2022-11-08 NOTE — PROGRESS NOTES
Assessment and Plan:     Problem List Items Addressed This Visit        Digestive    Fatty liver     -now on statin  -recommend weight loss  -monitor            Endocrine    Hypothyroidism     -TSH normal  -continue levothyroxine 112mcg daily         Relevant Orders    TSH, 3rd generation with Free T4 reflex       Cardiovascular and Mediastinum    Benign essential hypertension     -controlled  -continue amlodipine 10mg daily, lisinopril 20mg daily and metoprolol 25mg q12  -adhere to low sodium diet  -lose weight         Relevant Orders    Comprehensive metabolic panel    Lipid panel    STEMI involving right coronary artery (HCC)     -s/p PCI  -on DAPT, statin, lisinopril and metoprolol  -she is asymptomatic  -follow up with Cardio            Musculoskeletal and Integument    Osteopenia     -she has continued to decline surveillance DXA  -recommend calcium and vitamin D and weight bearing exercises         Relevant Orders    Comprehensive metabolic panel       Hematopoietic and Hemostatic    Essential thrombocythemia ([facility] [address] )     -+DEAN mutation  -last platelets at 652X  -on hydroxyurea 500mg qod as per Heme            Other    Presence of drug-eluting stent in right coronary artery      Other Visit Diagnoses     Acute right-sided low back pain without sciatica    -  Primary    -cont tylenol and aspercreme  -declines PT  -consider new mattress Medicare annual wellness visit, subsequent        Encounter for immunization        Relevant Orders    influenza vaccine, high-dose, PF 0 7 mL (FLUZONE HIGH-DOSE) (Completed)    Encounter for screening mammogram for breast cancer        Relevant Orders    Mammo screening bilateral w 3d & cad        BMI Counseling: Body mass index is 32 11 kg/m²  The BMI is above normal  Nutrition recommendations include decreasing portion sizes, consuming healthier snacks, limiting drinks that contain sugar, moderation in carbohydrate intake and reducing intake of cholesterol   Exercise recommendations include exercising 3-5 times per week and strength training exercises  No pharmacotherapy was ordered  Rationale for BMI follow-up plan is due to patient being overweight or obese  Depression Screening and Follow-up Plan: Patient was screened for depression during today's encounter  They screened negative with a PHQ-2 score of 0  Urinary Incontinence Plan of Care: counseling topics discussed: practice Kegel (pelvic floor strengthening) exercises, use restroom every 2 hours, limit alcohol, caffeine, spicy foods, and acidic foods, limiting fluid intake 3-4 hours before bed and preventing constipation  Preventive health issues were discussed with patient, and age appropriate screening tests were ordered as noted in patient's After Visit Summary  Personalized health advice and appropriate referrals for health education or preventive services given if needed, as noted in patient's After Visit Summary  History of Present Illness:     Patient presents for a Medicare Wellness Visit    79yo female with hypothyroidism, HTN, vertigo, STEMI s/p HANH to RCA, IFG, insomnia, ET, fatty liver here for follow up care  She feel tired  Has low back pain x1 week  She has taken tylenol and aspercreme  Has radiation to R knee  No strenuous activity        Thyroid Problem  Presents for follow-up visit  Symptoms include weight gain  Patient reports no diarrhea or palpitations  The symptoms have been stable  Hypertension  This is a chronic problem  The current episode started more than 1 year ago  Associated symptoms include headaches  Pertinent negatives include no chest pain, palpitations or shortness of breath  Risk factors for coronary artery disease include dyslipidemia  Past treatments include calcium channel blockers, beta blockers and ACE inhibitors  Identifiable causes of hypertension include a thyroid problem        Patient Care Team:  Merced King DO as PCP - General  Aletha Yeager MD Xavier Owens (Optometry)  Daisy Fry MD (Cardiology)     Review of Systems:     Review of Systems   Constitutional: Positive for activity change, appetite change, unexpected weight change (gradual weight gain) and weight gain  HENT: Negative for congestion, postnasal drip, rhinorrhea and sneezing  Respiratory: Negative for cough, chest tightness, shortness of breath and wheezing  Cardiovascular: Negative for chest pain, palpitations and leg swelling  Gastrointestinal: Negative for abdominal pain, diarrhea and nausea  Indigestion   Genitourinary: Positive for frequency  Incontinence   Musculoskeletal: Positive for arthralgias and back pain  Neurological: Positive for headaches  Negative for dizziness and numbness  Psychiatric/Behavioral: Negative for dysphoric mood  Problem List:     Patient Active Problem List   Diagnosis   • Myeloproliferative disorder [facility])   • Benign essential hypertension   • Essential thrombocythemia ([facility] [address] )   • Fatty liver   • Hypothyroidism   • Impaired fasting glucose   • Insomnia   • Osteopenia   • Prediabetes   • STEMI involving right coronary artery (HCC)   • Presence of drug-eluting stent in right coronary artery   • Vertigo, benign paroxysmal, left      Past Medical and Surgical History:     Past Medical History:   Diagnosis Date   • Biliary colic     last assessed: 01/05/16   • Cholecystitis    • Disease of thyroid gland     hypo   • Hematuria     last assessed: 05/05/17   • Hypercalcemia 12/30/2019   • Hypertension    • Hypokalemia 1/22/2016   • Leukocytosis     last assessed: 04/05/16   • Obesity (BMI 30 0-34 9) 6/21/2019   • Toxic nodular goiter    • Uterine fibroid      Past Surgical History:   Procedure Laterality Date   • CARDIAC CATHETERIZATION N/A 1/28/2022    Procedure: Cardiac pci;  Surgeon: Adiel Serna MD;  Location: [facility] CARDIAC [facility];   Service: Cardiology   • HYSTERECTOMY      INGRIS, with removal of both ovaries   • MN LAP,CHOLECYSTECTOMY N/A 3/9/2016    Procedure: Lexi Brannon ;  Surgeon: Donna Munoz MD;  Location: [facility] MAIN OR;  Service: General   • TUBAL LIGATION        Family History:     Family History   Problem Relation Age of Onset   • Hypothyroidism Mother    • Heart disease Brother         Cardiac disorder      Social History:     Social History     Socioeconomic History   • Marital status:      Spouse name: None   • Number of children: None   • Years of education: None   • Highest education level: None   Occupational History   • Occupation: Retired   Tobacco Use   • Smoking status: Former Smoker     Packs/day: 0 50     Years: 40 00     Pack years: 20 00     Types: Cigarettes     Quit date: 1/1/2012     Years since quitting: 10 8   • Smokeless tobacco: Never Used   Vaping Use   • Vaping Use: Never used   Substance and Sexual Activity   • Alcohol use: No   • Drug use: No   • Sexual activity: Never   Other Topics Concern   • None   Social History Narrative   • None     Social Determinants of Health     Financial Resource Strain: Low Risk    • Difficulty of Paying Living Expenses: Not hard at all   Food Insecurity: No Food Insecurity   • Worried About Running Out of Food in the Last Year: Never true   • Ran Out of Food in the Last Year: Never true   Transportation Needs: No Transportation Needs   • Lack of Transportation (Medical): No   • Lack of Transportation (Non-Medical):  No   Physical Activity: Not on file   Stress: Not on file   Social Connections: Not on file   Intimate Partner Violence: Not on file   Housing Stability: Unknown   • Unable to Pay for Housing in the Last Year: No   • Number of Places Lived in the Last Year: Not on file   • Unstable Housing in the Last Year: No      Medications and Allergies:     Current Outpatient Medications   Medication Sig Dispense Refill   • amLODIPine (NORVASC) 5 mg tablet Take 2 tablets (10 mg total) by mouth daily 180 tablet 1   • aspirin 81 MG tablet Take 81 mg by mouth daily  • atorvastatin (LIPITOR) 40 mg tablet Take 1 tablet (40 mg total) by mouth every evening 90 tablet 1   • clopidogrel (Plavix) 75 mg tablet Take 1 tablet (75 mg total) by mouth daily 90 tablet 3   • hydroxyurea (HYDREA) 500 mg capsule TAKE 1 CAPSULE BY MOUTH EVERY OTHER DAY 30 capsule 11   • levothyroxine 112 mcg tablet Take 1 tablet (112 mcg total) by mouth daily 90 tablet 1   • lisinopril (ZESTRIL) 20 mg tablet Take 1 tablet (20 mg total) by mouth daily 90 tablet 1   • metoprolol tartrate (LOPRESSOR) 25 mg tablet Take 1 tablet (25 mg total) by mouth every 12 (twelve) hours 90 tablet 1   • ferrous sulfate 324 (65 Fe) mg Take 1 tablet (324 mg total) by mouth every other day (Patient not taking: No sig reported) 60 tablet 0   • nitroglycerin (NITROSTAT) 0 4 mg SL tablet Place 1 tablet (0 4 mg total) under the tongue every 5 (five) minutes as needed for chest pain (Patient not taking: No sig reported) 90 tablet 0     No current facility-administered medications for this visit  No Known Allergies   Immunizations:     Immunization History   Administered Date(s) Administered   • COVID-19 J&J (Esther) vaccine 0 5 mL 04/05/2021   • Influenza Split High Dose Preservative Free IM 12/11/2013, 11/03/2014, 09/09/2015, 11/10/2016, 10/06/2017   • Influenza, high dose seasonal 0 7 mL 10/17/2018, 12/30/2019, 10/09/2020, 11/03/2021, 11/08/2022   • Influenza, seasonal, injectable 11/07/2012   • Pneumococcal Conjugate 13-Valent 11/03/2014   • Pneumococcal Polysaccharide PPV23 11/07/2012      Health Maintenance:         Topic Date Due   • Lung Cancer Screening  Never done   • Colorectal Cancer Screening  01/16/2014   • Breast Cancer Screening: Mammogram  01/24/2021   • Hepatitis C Screening  Completed         Topic Date Due   • COVID-19 Vaccine (2 - Booster for Esther series) 05/31/2021      Medicare Screening Tests and Risk Assessments:     Kajal Germain is here for her Subsequent Wellness visit   Last Medicare Wellness visit information reviewed, patient interviewed and updates made to the record today  Health Risk Assessment:   Patient rates overall health as good  Patient feels that their physical health rating is same  Patient is satisfied with their life  Eyesight was rated as same  Hearing was rated as same  Patient feels that their emotional and mental health rating is much better  Patients states they are never, rarely angry  Patient states they are often unusually tired/fatigued  Pain experienced in the last 7 days has been some  Patient's pain rating has been 8/10  Patient states that she has experienced weight loss or gain in last 6 months  Low back pain    Depression Screening:   PHQ-2 Score: 0      Fall Risk Screening: In the past year, patient has experienced: no history of falling in past year      Urinary Incontinence Screening:   Patient has leaked urine accidently in the last six months  Home Safety:  Patient does not have trouble with stairs inside or outside of their home  Patient has working smoke alarms and has working carbon monoxide detector  Home safety hazards include: none  Nutrition:   Current diet is No Added Salt  Medications:   Patient is not currently taking any over-the-counter supplements  Patient is able to manage medications  Activities of Daily Living (ADLs)/Instrumental Activities of Daily Living (IADLs):   Walk and transfer into and out of bed and chair?: Yes  Dress and groom yourself?: Yes    Bathe or shower yourself?: Yes    Feed yourself?  Yes  Do your laundry/housekeeping?: Yes  Manage your money, pay your bills and track your expenses?: Yes  Make your own meals?: Yes    Do your own shopping?: Yes    Durable Medical Equipment Suppliers  pt does not know    Previous Hospitalizations:   Any hospitalizations or ED visits within the last 12 months?: Yes    How many hospitalizations have you had in the last year?: 1-2    Advance Care Planning:   Living will: No Durable POA for healthcare: No    Advanced directive: No    Advanced directive counseling given: Yes    Five wishes given: Yes      Comments: Pt given Five Wishes document, pt to provide copy when complete  Cognitive Screening:   Provider or family/friend/caregiver concerned regarding cognition?: No    PREVENTIVE SCREENINGS      Cardiovascular Screening:    General: Screening Not Indicated and History Lipid Disorder      Diabetes Screening:     General: Screening Current      Colorectal Cancer Screening:     General: Screening Current      Breast Cancer Screening:     General: Risks and Benefits Discussed    Due for: Mammogram        Cervical Cancer Screening:    General: Screening Not Indicated      Osteoporosis Screening:    General: Screening Current      Abdominal Aortic Aneurysm (AAA) Screening:        General: Screening Not Indicated      Lung Cancer Screening:     General: Screening Not Indicated      Hepatitis C Screening:    General: Screening Current    Screening, Brief Intervention, and Referral to Treatment (SBIRT)    Screening  Typical number of drinks in a day: 0  Typical number of drinks in a week: 0  Interpretation: Low risk drinking behavior  Single Item Drug Screening:  How often have you used an illegal drug (including marijuana) or a prescription medication for non-medical reasons in the past year? never    Single Item Drug Screen Score: 0  Interpretation: Negative screen for possible drug use disorder    Brief Intervention  Alcohol & drug use screenings were reviewed  No concerns regarding substance use disorder identified  Time Spent  Time spent providing alcohol/substance abuse assessment and intervention services: 0 minutes    Other Counseling Topics:   Car/seat belt/driving safety, skin self-exam, sunscreen and regular weightbearing exercise and calcium and vitamin D intake  Immunizations discussed  Influenza vaccine due today    Consider COVID bivalent booster, tdap and shingrix  No exam data present     Physical Exam:     /78 (BP Location: Right arm, Patient Position: Sitting, Cuff Size: Standard)   Pulse 86   Temp 99 5 °F (37 5 °C) (Tympanic)   Resp 16   Ht 5' 2 5" (1 588 m)   Wt 80 9 kg (178 lb 6 4 oz)   SpO2 96%   BMI 32 11 kg/m²     Physical Exam  Vitals reviewed  Constitutional:       General: She is not in acute distress  Appearance: She is obese  She is not diaphoretic  HENT:      Head: Normocephalic  Right Ear: Tympanic membrane and ear canal normal  There is no impacted cerumen  Left Ear: Tympanic membrane and ear canal normal  There is no impacted cerumen  Nose: Nose normal  No congestion or rhinorrhea  Mouth/Throat:      Mouth: Mucous membranes are moist       Pharynx: Oropharynx is clear  No oropharyngeal exudate or posterior oropharyngeal erythema  Eyes:      Extraocular Movements: Extraocular movements intact  Conjunctiva/sclera: Conjunctivae normal       Pupils: Pupils are equal, round, and reactive to light  Neck:      Thyroid: No thyromegaly or thyroid tenderness  Vascular: No carotid bruit  Cardiovascular:      Rate and Rhythm: Normal rate and regular rhythm  Pulses: Normal pulses  Heart sounds: Normal heart sounds  Pulmonary:      Effort: Pulmonary effort is normal  No respiratory distress  Breath sounds: Normal breath sounds  No wheezing  Abdominal:      General: Bowel sounds are normal  There is no distension  Palpations: Abdomen is soft  Tenderness: There is no abdominal tenderness  Comments: obese   Musculoskeletal:      Cervical back: Neck supple  No tenderness  Right lower leg: No edema  Left lower leg: No edema  Lymphadenopathy:      Cervical: No cervical adenopathy  Skin:     Coloration: Skin is not pale  Neurological:      Mental Status: She is alert and oriented to person, place, and time  Motor: No weakness     Psychiatric:         Mood and Affect: Mood normal           Recent Results (from the past 504 hour(s))   LDL cholesterol, direct    Collection Time: 10/24/22  9:33 AM   Result Value Ref Range    LDL Direct 68 0 - 100 mg/dl   Hepatic function panel    Collection Time: 10/24/22  9:33 AM   Result Value Ref Range    Total Bilirubin 0 60 0 20 - 1 00 mg/dL    Bilirubin, Direct 0 36 (H) 0 00 - 0 20 mg/dL    Alkaline Phosphatase 111 46 - 116 U/L    AST 22 5 - 45 U/L    ALT 21 12 - 78 U/L    Total Protein 7 6 6 4 - 8 4 g/dL    Albumin 3 5 3 5 - 5 0 g/dL   Lipid panel    Collection Time: 10/24/22  9:33 AM   Result Value Ref Range    Cholesterol 124 See Comment mg/dL    Triglycerides 101 See Comment mg/dL    HDL, Direct 46 (L) >=50 mg/dL    LDL Calculated 58 0 - 100 mg/dL    Non-HDL-Chol (CHOL-HDL) 78 mg/dl   TSH, 3rd generation with Free T4 reflex    Collection Time: 10/24/22  9:33 AM   Result Value Ref Range    TSH 3RD GENERATON 1 070 0 450 - 4 500 uIU/mL       More Lassiters, DO

## 2022-11-08 NOTE — ASSESSMENT & PLAN NOTE
-controlled  -continue amlodipine 10mg daily, lisinopril 20mg daily and metoprolol 25mg q12  -adhere to low sodium diet  -lose weight

## 2022-12-02 DIAGNOSIS — D47.3 ESSENTIAL THROMBOCYTHEMIA (HCC): ICD-10-CM

## 2022-12-02 RX ORDER — HYDROXYUREA 500 MG/1
CAPSULE ORAL
Qty: 45 CAPSULE | Refills: 3 | Status: SHIPPED | OUTPATIENT
Start: 2022-12-02

## 2022-12-12 DIAGNOSIS — I10 BENIGN ESSENTIAL HYPERTENSION: ICD-10-CM

## 2022-12-12 DIAGNOSIS — I21.3 STEMI (ST ELEVATION MYOCARDIAL INFARCTION) (HCC): ICD-10-CM

## 2022-12-12 RX ORDER — LISINOPRIL 20 MG/1
20 TABLET ORAL DAILY
Qty: 90 TABLET | Refills: 1 | Status: SHIPPED | OUTPATIENT
Start: 2022-12-12

## 2022-12-12 RX ORDER — ATORVASTATIN CALCIUM 40 MG/1
40 TABLET, FILM COATED ORAL EVERY EVENING
Qty: 90 TABLET | Refills: 1 | Status: SHIPPED | OUTPATIENT
Start: 2022-12-12

## 2023-01-16 DIAGNOSIS — I21.3 STEMI (ST ELEVATION MYOCARDIAL INFARCTION) (HCC): ICD-10-CM

## 2023-01-16 RX ORDER — ATORVASTATIN CALCIUM 40 MG/1
40 TABLET, FILM COATED ORAL DAILY
Qty: 90 TABLET | Refills: 1 | Status: SHIPPED | OUTPATIENT
Start: 2023-01-16

## 2023-02-20 DIAGNOSIS — I10 BENIGN ESSENTIAL HYPERTENSION: ICD-10-CM

## 2023-02-20 DIAGNOSIS — I10 BENIGN HYPERTENSION: ICD-10-CM

## 2023-02-20 RX ORDER — LISINOPRIL 20 MG/1
20 TABLET ORAL DAILY
Qty: 90 TABLET | Refills: 1 | Status: SHIPPED | OUTPATIENT
Start: 2023-02-20

## 2023-02-20 RX ORDER — AMLODIPINE BESYLATE 5 MG/1
10 TABLET ORAL DAILY
Qty: 180 TABLET | Refills: 1 | Status: SHIPPED | OUTPATIENT
Start: 2023-02-20

## 2023-03-01 DIAGNOSIS — E03.9 ACQUIRED HYPOTHYROIDISM: ICD-10-CM

## 2023-03-01 DIAGNOSIS — I21.3 STEMI (ST ELEVATION MYOCARDIAL INFARCTION) (HCC): ICD-10-CM

## 2023-03-01 RX ORDER — LEVOTHYROXINE SODIUM 112 UG/1
112 TABLET ORAL DAILY
Qty: 90 TABLET | Refills: 1 | Status: SHIPPED | OUTPATIENT
Start: 2023-03-01

## 2023-04-21 NOTE — PROGRESS NOTES
Cardiology Follow Up    Nuzhat Lassiter  [birth year]  [id number]  [address]  [phone]    1  Essential (primary) hypertension        2  Hyperlipidemia, unspecified hyperlipidemia type        3  S/P drug eluting coronary stent placement            Interval History:  Patient is here for a follow-up visit  Antione Yi was hospitalized January 2022 with an RCA STEMI   Cardiac catheterization done January 28, 2022 demonstrated a closed RCA with successful PCI and placement of a HANH   Other vessels had mild diffuse disease   Echocardiogram September 2022 demonstrated LVEF of 60% with mild inferobasal hypokinesis  Aortic sclerosis was noted  Compared to prior echo done January 2022 LV function appears improved   Lipid profile done October 2022 demonstrated total cholesterol of 124 with an HDL of 46 and a calculated LDL of 58  Patient is on atorvastatin  Patient has essential thrombocythemia and is on hydroxyurea and followed by Heme/Onc  Patient has had no chest pain or significant dyspnea  Vital signs are stable today      Patient Active Problem List   Diagnosis    [facility]rol[facility] disorder ([facility]    Benign essential hypertension    Essential thrombocythemia ([facility]    Fatty liver    Hypothyroidism    Impaired fasting glucose    Insomnia    Osteopenia    Prediabetes    STEMI involving right coronary artery ([facility] )    Presence of drug-eluting stent in right coronary artery    Vertigo, benign paroxysmal, left     Past Medical History:   Diagnosis Date    Biliary colic     last assessed: 01/05/16    Cholecystitis     Disease of thyroid gland     hypo    Hematuria     last assessed: 05/05/17    Hypercalcemia 12/30/2019    Hypertension     Hypokalemia 1/22/2016    Leukocytosis     last assessed: 04/05/16    Obesity (BMI 30 0-34 9) 6/21/2019    Toxic nodular goiter     Uterine fibroid Social History     Socioeconomic History    Marital status:      Spouse name: Not on file    Number of children: Not on file    Years of education: Not on file    Highest education level: Not on file   Occupational History    Occupation: Retired   Tobacco Use    Smoking status: Former     Packs/day: 0 50     Years: 40 00     Pack years: 20 00     Types: Cigarettes     Quit date: 2012     Years since quittin 3    Smokeless tobacco: Never   Vaping Use    Vaping Use: Never used   Substance and Sexual Activity    Alcohol use: No    Drug use: No    Sexual activity: Never   Other Topics Concern    Not on file   Social History Narrative    Not on file     Social Determinants of Health     Financial Resource Strain: Low Risk     Difficulty of Paying Living Expenses: Not hard at all   Food Insecurity: Not on file   Transportation Needs: No Transportation Needs    Lack of Transportation (Medical): No    Lack of Transportation (Non-Medical): No   Physical Activity: Not on file   Stress: Not on file   Social Connections: Not on file   Intimate Partner Violence: Not on file   Housing Stability: Not on file      Family History   Problem Relation Age of Onset    Hypothyroidism Mother     Heart disease Brother         Cardiac disorder     Past Surgical History:   Procedure Laterality Date    CARDIAC CATHETERIZATION N/A 2022    Procedure: Cardiac pci;  Surgeon: Omar Perez MD;  Location: [facility]; Service: Cardiology    HYSTERECTOMY      INGRIS, with removal of both ovaries    [place] LAP,CHOLECYSTECTOMY N/A 3/9/2016    Procedure: LAPAROSCOPIC CHOLECYSTECTOMY ;  Surgeon: Home Antoine MD;  Location: [facility];  Service: General    TUBAL LIGATION         Current Outpatient Medications:     amLODIPine (NORVASC) 5 mg tablet, Take 2 tablets (10 mg total) by mouth daily, Disp: 180 tablet, Rfl: 1    aspirin 81 MG tablet, Take 81 mg by mouth daily  , Disp: , Rfl:     atorvastatin "(LIPITOR) 40 mg tablet, Take 1 tablet (40 mg total) by mouth daily, Disp: 90 tablet, Rfl: 1    clopidogrel (PLAVIX) 75 mg tablet, Take 1 tablet by mouth once daily, Disp: 90 tablet, Rfl: 2    hydroxyurea (HYDREA) 500 mg capsule, TAKE 1 CAPSULE BY MOUTH EVERY OTHER DAY, Disp: 45 capsule, Rfl: 3    levothyroxine 112 mcg tablet, Take 1 tablet (112 mcg total) by mouth daily, Disp: 90 tablet, Rfl: 1    lisinopril (ZESTRIL) 20 mg tablet, Take 1 tablet (20 mg total) by mouth daily, Disp: 90 tablet, Rfl: 1    metoprolol tartrate (LOPRESSOR) 25 mg tablet, Take 1 tablet (25 mg total) by mouth every 12 (twelve) hours, Disp: 90 tablet, Rfl: 1    nitroglycerin (NITROSTAT) 0 4 mg SL tablet, Place 1 tablet (0 4 mg total) under the tongue every 5 (five) minutes as needed for chest pain, Disp: 90 tablet, Rfl: 0    ferrous sulfate 324 (65 Fe) mg, Take 1 tablet (324 mg total) by mouth every other day (Patient not taking: Reported on 10/31/2022), Disp: 60 tablet, Rfl: 0  No Known Allergies    Labs:not applicable  Imaging: No results found  Review of Systems:  Review of Systems   All other systems reviewed and are negative  Physical Exam:  /70 (BP Location: Left arm, Patient Position: Sitting, Cuff Size: Standard)   Pulse 66   Ht 5' 2 5\" (1 588 m) Comment: verbal  Wt 78 8 kg (173 lb 12 8 oz)   SpO2 99%   BMI 31 28 kg/m²   Physical Exam  Vitals reviewed  Constitutional:       Appearance: She is well-developed  HENT:      Head: Normocephalic and atraumatic  Eyes:      Conjunctiva/sclera: Conjunctivae normal       Pupils: Pupils are equal, round, and reactive to light  Cardiovascular:      Rate and Rhythm: Normal rate  Heart sounds: Normal heart sounds  Pulmonary:      Effort: Pulmonary effort is normal       Breath sounds: Normal breath sounds  Musculoskeletal:      Cervical back: Normal range of motion and neck supple  Skin:     General: Skin is warm and dry     Neurological:      Mental " Status: She is alert and oriented to person, place, and time  Discussion/Summary:I will continue the patient's present medical regimen  The patient appears well compensated  I have asked the patient to call if there is a problem in the interim otherwise I will see the patient in six months time

## 2023-04-25 ENCOUNTER — TELEPHONE (OUTPATIENT)
Dept: HEMATOLOGY ONCOLOGY | Facility: CLINIC | Age: 78
End: 2023-04-25

## 2023-04-25 NOTE — TELEPHONE ENCOUNTER
Appointment Confirmation   Who are you speaking with? Patient   If it is not the patient, are they listed on an active communication consent form? N/A   Which provider is the appointment scheduled with? Dr Abby Segundo   When is the appointment scheduled? Please list date and time 6/13/23 8:00AM   At which location is the appointment scheduled to take place? [place]   Did caller verbalize understanding of appointment details?  Yes

## 2023-05-01 ENCOUNTER — OFFICE VISIT (OUTPATIENT)
Dept: CARDIOLOGY CLINIC | Facility: CLINIC | Age: 78
End: 2023-05-01

## 2023-05-01 VITALS
HEART RATE: 66 BPM | BODY MASS INDEX: 30.79 KG/M2 | SYSTOLIC BLOOD PRESSURE: 134 MMHG | DIASTOLIC BLOOD PRESSURE: 70 MMHG | WEIGHT: 173.8 LBS | OXYGEN SATURATION: 99 % | HEIGHT: 63 IN

## 2023-05-01 DIAGNOSIS — E78.5 HYPERLIPIDEMIA, UNSPECIFIED HYPERLIPIDEMIA TYPE: ICD-10-CM

## 2023-05-01 DIAGNOSIS — I10 ESSENTIAL (PRIMARY) HYPERTENSION: Primary | ICD-10-CM

## 2023-05-01 DIAGNOSIS — Z95.5 S/P DRUG ELUTING CORONARY STENT PLACEMENT: ICD-10-CM

## 2023-05-07 ENCOUNTER — APPOINTMENT (OUTPATIENT)
Dept: LAB | Age: 78
End: 2023-05-07

## 2023-05-07 DIAGNOSIS — I10 BENIGN ESSENTIAL HYPERTENSION: ICD-10-CM

## 2023-05-07 DIAGNOSIS — M85.80 OSTEOPENIA, UNSPECIFIED LOCATION: ICD-10-CM

## 2023-05-07 DIAGNOSIS — E03.9 ACQUIRED HYPOTHYROIDISM: ICD-10-CM

## 2023-05-07 LAB
ALBUMIN SERPL BCP-MCNC: 3.6 G/DL (ref 3.5–5)
ALP SERPL-CCNC: 99 U/L (ref 46–116)
ALT SERPL W P-5'-P-CCNC: 16 U/L (ref 12–78)
ANION GAP SERPL CALCULATED.3IONS-SCNC: 0 MMOL/L (ref 4–13)
AST SERPL W P-5'-P-CCNC: 14 U/L (ref 5–45)
BILIRUB SERPL-MCNC: 0.38 MG/DL (ref 0.2–1)
BUN SERPL-MCNC: 13 MG/DL (ref 5–25)
CALCIUM SERPL-MCNC: 9.3 MG/DL (ref 8.3–10.1)
CHLORIDE SERPL-SCNC: 110 MMOL/L (ref 96–108)
CHOLEST SERPL-MCNC: 131 MG/DL
CO2 SERPL-SCNC: 27 MMOL/L (ref 21–32)
CREAT SERPL-MCNC: 0.73 MG/DL (ref 0.6–1.3)
GFR SERPL CREATININE-BSD FRML MDRD: 80 ML/MIN/1.73SQ M
GLUCOSE P FAST SERPL-MCNC: 98 MG/DL (ref 65–99)
HDLC SERPL-MCNC: 47 MG/DL
LDLC SERPL CALC-MCNC: 65 MG/DL (ref 0–100)
NONHDLC SERPL-MCNC: 84 MG/DL
POTASSIUM SERPL-SCNC: 4.1 MMOL/L (ref 3.5–5.3)
PROT SERPL-MCNC: 7.1 G/DL (ref 6.4–8.4)
SODIUM SERPL-SCNC: 137 MMOL/L (ref 135–147)
TRIGL SERPL-MCNC: 93 MG/DL
TSH SERPL DL<=0.05 MIU/L-ACNC: 2.21 UIU/ML (ref 0.45–4.5)

## 2023-05-09 ENCOUNTER — OFFICE VISIT (OUTPATIENT)
Dept: INTERNAL MEDICINE CLINIC | Facility: CLINIC | Age: 78
End: 2023-05-09

## 2023-05-09 VITALS
RESPIRATION RATE: 16 BRPM | OXYGEN SATURATION: 99 % | HEART RATE: 79 BPM | BODY MASS INDEX: 31.15 KG/M2 | DIASTOLIC BLOOD PRESSURE: 86 MMHG | SYSTOLIC BLOOD PRESSURE: 142 MMHG | WEIGHT: 175.8 LBS | TEMPERATURE: 98.4 F | HEIGHT: 63 IN

## 2023-05-09 DIAGNOSIS — Z95.5 PRESENCE OF DRUG-ELUTING STENT IN RIGHT CORONARY ARTERY: ICD-10-CM

## 2023-05-09 DIAGNOSIS — K76.0 FATTY LIVER: ICD-10-CM

## 2023-05-09 DIAGNOSIS — E03.9 ACQUIRED HYPOTHYROIDISM: ICD-10-CM

## 2023-05-09 DIAGNOSIS — I10 BENIGN ESSENTIAL HYPERTENSION: Primary | ICD-10-CM

## 2023-05-09 DIAGNOSIS — I25.119 ATHEROSCLEROSIS OF NATIVE CORONARY ARTERY OF NATIVE HEART WITH ANGINA PECTORIS (HCC): ICD-10-CM

## 2023-05-09 NOTE — ASSESSMENT & PLAN NOTE
-fair control  -pt to write down home bp log for review  -adhere to low sodium diet  -continue amlodipine 5mg two tabs daily, lisinopril 20mg daily and metoprolol tartrate 25mg q12

## 2023-05-09 NOTE — ASSESSMENT & PLAN NOTE
-s/p HANH  -she has been asymptomatic  -continue ASA, plavix, BB, statin, ACEI and nitro prn  -follow up with Cardiology

## 2023-05-09 NOTE — PROGRESS NOTES
Assessment/Plan:    Problem List Items Addressed This Visit        Digestive    Fatty liver     -continue atorvastatin  -check liver elastography  -lose weight         Relevant Orders    US elastography    Comprehensive metabolic panel       Endocrine    Hypothyroidism     -TSH normal  -continue levothyroxine 112mcg daily             Cardiovascular and Mediastinum    Benign essential hypertension - Primary     -fair control  -pt to write down home bp log for review  -adhere to low sodium diet  -continue amlodipine 5mg two tabs daily, lisinopril 20mg daily and metoprolol tartrate 25mg q12         Relevant Orders    Comprehensive metabolic panel    Atherosclerosis of native coronary artery of native heart with angina pectoris ([address] )     -s/p HANH  -she has been asymptomatic  -continue ASA, plavix, BB, statin, ACEI and nitro prn  -follow up with Cardiology            Other    Presence of drug-eluting stent in right coronary artery     -secondary to STEMI  -remains on DAPT  -follow up with Cardio            Subjective:      Patient ID: Mari Singleton is a 78 y o  female  HPI  79yo female with fatty liver, hypothyroidism, HTN, osteopenia, ET, vertigo, insomnia and h/o STEMI s/p HANH to RA here for follow up care  She denies restarting cigarette use  Infrequent monitors her home bp, reports sometimes they are high and other times they are low  The following portions of the patient's history were reviewed and updated as appropriate: allergies, current medications, past family history, past medical history, past social history, past surgical history and problem list       Current Outpatient Medications:   •  amLODIPine (NORVASC) 5 mg tablet, Take 2 tablets (10 mg total) by mouth daily, Disp: 180 tablet, Rfl: 1  •  aspirin 81 MG tablet, Take 81 mg by mouth daily  , Disp: , Rfl:   •  atorvastatin (LIPITOR) 40 mg tablet, Take 1 tablet (40 mg total) by mouth daily, Disp: 90 tablet, Rfl: 1  •  clopidogrel (PLAVIX) 75 mg "tablet, Take 1 tablet by mouth once daily, Disp: 90 tablet, Rfl: 2  •  hydroxyurea (HYDREA) 500 mg capsule, TAKE 1 CAPSULE BY MOUTH EVERY OTHER DAY, Disp: 45 capsule, Rfl: 3  •  levothyroxine 112 mcg tablet, Take 1 tablet (112 mcg total) by mouth daily, Disp: 90 tablet, Rfl: 1  •  lisinopril (ZESTRIL) 20 mg tablet, Take 1 tablet (20 mg total) by mouth daily, Disp: 90 tablet, Rfl: 1  •  metoprolol tartrate (LOPRESSOR) 25 mg tablet, Take 1 tablet (25 mg total) by mouth every 12 (twelve) hours, Disp: 90 tablet, Rfl: 1  •  nitroglycerin (NITROSTAT) 0 4 mg SL tablet, Place 1 tablet (0 4 mg total) under the tongue every 5 (five) minutes as needed for chest pain, Disp: 90 tablet, Rfl: 0  •  ferrous sulfate 324 (65 Fe) mg, Take 1 tablet (324 mg total) by mouth every other day (Patient not taking: Reported on 10/31/2022), Disp: 60 tablet, Rfl: 0    Review of Systems   Constitutional: Negative for activity change, appetite change and unexpected weight change  HENT: Negative for congestion, postnasal drip and rhinorrhea  Respiratory: Negative for cough, chest tightness, shortness of breath and wheezing  Cardiovascular: Negative for chest pain, palpitations and leg swelling  Gastrointestinal: Negative for abdominal pain, blood in stool, diarrhea and nausea  Neurological: Positive for dizziness (head turns)  Negative for headaches  Hematological: Bruises/bleeds easily  Objective:    /86 (BP Location: Right arm, Patient Position: Sitting, Cuff Size: Standard)   Pulse 79   Temp 98 4 °F (36 9 °C)   Resp 16   Ht 5' 2 5\" (1 588 m)   Wt 79 7 kg (175 lb 12 8 oz)   SpO2 99%   BMI 31 64 kg/m²      Physical Exam  Vitals reviewed  Constitutional:       General: She is not in acute distress  Appearance: She is obese  Comments: Smells of smoke   Cardiovascular:      Rate and Rhythm: Normal rate and regular rhythm  Pulses: Normal pulses  Heart sounds: Normal heart sounds     Pulmonary:    " Effort: Pulmonary effort is normal  No respiratory distress  Breath sounds: Normal breath sounds  No wheezing  Musculoskeletal:      Right lower leg: No edema  Left lower leg: No edema  Neurological:      Mental Status: She is alert and oriented to person, place, and time     Psychiatric:         Mood and Affect: Mood normal            Recent Results (from the past 672 hour(s))   Comprehensive metabolic panel    Collection Time: 05/07/23  9:24 AM   Result Value Ref Range    Sodium 137 135 - 147 mmol/L    Potassium 4 1 3 5 - 5 3 mmol/L    Chloride 110 (H) 96 - 108 mmol/L    CO2 27 21 - 32 mmol/L    ANION GAP 0 (L) 4 - 13 mmol/L    BUN 13 5 - 25 mg/dL    Creatinine 0 73 0 60 - 1 30 mg/dL    Glucose, Fasting 98 65 - 99 mg/dL    Calcium 9 3 8 3 - 10 1 mg/dL    AST 14 5 - 45 U/L    ALT 16 12 - 78 U/L    Alkaline Phosphatase 99 46 - 116 U/L    Total Protein 7 1 6 4 - 8 4 g/dL    Albumin 3 6 3 5 - 5 0 g/dL    Total Bilirubin 0 38 0 20 - 1 00 mg/dL    eGFR 80 ml/min/1 73sq m   Lipid panel    Collection Time: 05/07/23  9:24 AM   Result Value Ref Range    Cholesterol 131 See Comment mg/dL    Triglycerides 93 See Comment mg/dL    HDL, Direct 47 (L) >=50 mg/dL    LDL Calculated 65 0 - 100 mg/dL    Non-HDL-Chol (CHOL-HDL) 84 mg/dl   TSH, 3rd generation with Free T4 reflex    Collection Time: 05/07/23  9:24 AM   Result Value Ref Range    TSH 3RD GENERATON 2 210 0 450 - 4 500 uIU/mL

## 2023-06-07 DIAGNOSIS — I21.3 STEMI (ST ELEVATION MYOCARDIAL INFARCTION) (HCC): ICD-10-CM

## 2023-06-12 ENCOUNTER — TELEPHONE (OUTPATIENT)
Dept: HEMATOLOGY ONCOLOGY | Facility: CLINIC | Age: 78
End: 2023-06-12

## 2023-06-12 NOTE — TELEPHONE ENCOUNTER
Appointment Change  Cancel, Reschedule, Change to Virtual      Who are you speaking with? Patient   If it is not the patient, are they listed on an active communication consent form? N/A   Which provider is the appointment scheduled with? Dr Jimmy Rose   When is the appointment scheduled? Please list date and time 6/13/23 8:00AM   At which location is the appointment scheduled to take place? [place]   Was the appointment rescheduled or changed from an in person visit to a virtual visit? If so, please list the details of the change  7/27/23 8:00AM   What is the reason for the appointment change? Patient has no transportation    Was [facility] transport scheduled for this visit? No   Does [facility] transport need to be scheduled for the new visit (if applicable) No   Does the patient need an infusion appointment rescheduled? No   Does the patient have an infusion appointment scheduled? If so, when? No   Is the patient undergoing chemotherapy? No   Was the no-show policy reviewed for appointments being changed with less then 24 hours of notice?  Yes

## 2023-07-17 DIAGNOSIS — I21.3 STEMI (ST ELEVATION MYOCARDIAL INFARCTION) (HCC): ICD-10-CM

## 2023-07-17 RX ORDER — ATORVASTATIN CALCIUM 40 MG/1
40 TABLET, FILM COATED ORAL DAILY
Qty: 90 TABLET | Refills: 1 | Status: SHIPPED | OUTPATIENT
Start: 2023-07-17

## 2023-07-25 ENCOUNTER — APPOINTMENT (OUTPATIENT)
Dept: LAB | Age: 78
End: 2023-07-25
Payer: COMMERCIAL

## 2023-07-25 DIAGNOSIS — D47.3 ESSENTIAL THROMBOCYTHEMIA (HCC): ICD-10-CM

## 2023-07-25 LAB
ALBUMIN SERPL BCP-MCNC: 3.7 G/DL (ref 3.5–5)
ALP SERPL-CCNC: 105 U/L (ref 46–116)
ALT SERPL W P-5'-P-CCNC: 18 U/L (ref 12–78)
ANION GAP SERPL CALCULATED.3IONS-SCNC: 6 MMOL/L
AST SERPL W P-5'-P-CCNC: 17 U/L (ref 5–45)
BASOPHILS # BLD AUTO: 0.06 THOUSANDS/ÂΜL (ref 0–0.1)
BASOPHILS NFR BLD AUTO: 1 % (ref 0–1)
BILIRUB SERPL-MCNC: 0.63 MG/DL (ref 0.2–1)
BUN SERPL-MCNC: 12 MG/DL (ref 5–25)
CALCIUM SERPL-MCNC: 9.9 MG/DL (ref 8.3–10.1)
CHLORIDE SERPL-SCNC: 107 MMOL/L (ref 96–108)
CO2 SERPL-SCNC: 25 MMOL/L (ref 21–32)
CREAT SERPL-MCNC: 0.76 MG/DL (ref 0.6–1.3)
EOSINOPHIL # BLD AUTO: 0.09 THOUSAND/ÂΜL (ref 0–0.61)
EOSINOPHIL NFR BLD AUTO: 1 % (ref 0–6)
ERYTHROCYTE [DISTWIDTH] IN BLOOD BY AUTOMATED COUNT: 13.3 % (ref 11.6–15.1)
GFR SERPL CREATININE-BSD FRML MDRD: 76 ML/MIN/1.73SQ M
GLUCOSE P FAST SERPL-MCNC: 112 MG/DL (ref 65–99)
HCT VFR BLD AUTO: 44.7 % (ref 34.8–46.1)
HGB BLD-MCNC: 14.7 G/DL (ref 11.5–15.4)
IMM GRANULOCYTES # BLD AUTO: 0.03 THOUSAND/UL (ref 0–0.2)
IMM GRANULOCYTES NFR BLD AUTO: 0 % (ref 0–2)
LYMPHOCYTES # BLD AUTO: 1.19 THOUSANDS/ÂΜL (ref 0.6–4.47)
LYMPHOCYTES NFR BLD AUTO: 13 % (ref 14–44)
MCH RBC QN AUTO: 32.9 PG (ref 26.8–34.3)
MCHC RBC AUTO-ENTMCNC: 32.9 G/DL (ref 31.4–37.4)
MCV RBC AUTO: 100 FL (ref 82–98)
MONOCYTES # BLD AUTO: 0.63 THOUSAND/ÂΜL (ref 0.17–1.22)
MONOCYTES NFR BLD AUTO: 7 % (ref 4–12)
NEUTROPHILS # BLD AUTO: 6.85 THOUSANDS/ÂΜL (ref 1.85–7.62)
NEUTS SEG NFR BLD AUTO: 78 % (ref 43–75)
NRBC BLD AUTO-RTO: 0 /100 WBCS
PLATELET # BLD AUTO: 462 THOUSANDS/UL (ref 149–390)
PMV BLD AUTO: 9.1 FL (ref 8.9–12.7)
POTASSIUM SERPL-SCNC: 4.6 MMOL/L (ref 3.5–5.3)
PROT SERPL-MCNC: 7 G/DL (ref 6.4–8.4)
RBC # BLD AUTO: 4.47 MILLION/UL (ref 3.81–5.12)
SODIUM SERPL-SCNC: 138 MMOL/L (ref 135–147)
WBC # BLD AUTO: 8.85 THOUSAND/UL (ref 4.31–10.16)

## 2023-07-25 PROCEDURE — 85025 COMPLETE CBC W/AUTO DIFF WBC: CPT

## 2023-07-25 PROCEDURE — 80053 COMPREHEN METABOLIC PANEL: CPT

## 2023-07-25 PROCEDURE — 36415 COLL VENOUS BLD VENIPUNCTURE: CPT

## 2023-07-27 ENCOUNTER — OFFICE VISIT (OUTPATIENT)
Dept: HEMATOLOGY ONCOLOGY | Facility: CLINIC | Age: 78
End: 2023-07-27
Payer: COMMERCIAL

## 2023-07-27 DIAGNOSIS — D47.3 ESSENTIAL THROMBOCYTHEMIA (HCC): Primary | ICD-10-CM

## 2023-07-27 PROCEDURE — 99213 OFFICE O/P EST LOW 20 MIN: CPT | Performed by: INTERNAL MEDICINE

## 2023-07-27 NOTE — PROGRESS NOTES
Hematology Outpatient Follow - Up Note  Vinicius Garcia 70 y.o. female MRN: @ Encounter: [id number]        Date:  7/27/2023        Assessment/ Plan:    51-year-old female with history of essential thrombocythemia positive for JAK2 mutation she is on aspirin 81 mg daily, she is on hydroxyurea 500 mg p.o. every other day since 2/2020 with good control of her platelets count    Coronary artery disease status post angioplasty in January 2022    Follow-up in 12 months with CBC        Labs and imaging studies are reviewed by ordering provider once results are available. If there are findings that need immediate attention, you will be contacted when results available. Discussing results and the implication on your healthcare is best discussed in person at your follow-up visit. HPI:    51-year-old  female who was seen initially 6/2017 regarding persistent thrombocytosis going back to at least March 2015. May 2017 WBC 9.3, hemoglobin 15.1, MCV 91, platelets 678,000, 09% neutrophils, 19% lymphocytes, 9% monocytes, 2% eosinophils. November 2016 WBC 9.6, hemoglobin 15.1, platelets 185,967.  June 2016 platelets 917,725.  March 2016 platelets 589,081.  March 2015 platelets 225,362     She has a history of high blood pressure, hypothyroidism,  cholecystectomy in March 2016.  She is a smoker - used to smoke half pack of cigarettes every day for 40 years quit in 2014.  She does not drink alcohol.  History negative for blood disorders of cancer.  FADIA, rheumatoid factor normal.  Ultrasound of the abdomen did not identify hepatosplenomegaly.  DEAN 2 mutation present.      Hydrea 500mg po every other day initiated 2/2020     Aspirin 81 mg p.o. every other day          STEMI 1/31/22. Status post angioplasty  Interval History:        Previous Treatment:         Test Results:    Imaging: No results found.     Labs:   Lab Results   Component Value Date    WBC 8.85 07/25/2023    HGB 14.7 07/25/2023    HCT 44.7 07/25/2023  (H) 07/25/2023     (H) 07/25/2023     Lab Results   Component Value Date     (L) 12/29/2015    K 4.6 07/25/2023     07/25/2023    CO2 25 07/25/2023    ANIONGAP 13 12/29/2015    BUN 12 07/25/2023    CREATININE 0.76 07/25/2023    GLUCOSE 172 (H) 12/29/2015    GLUF 112 (H) 07/25/2023    CALCIUM 9.9 07/25/2023    CORRECTEDCA 10.1 01/29/2022    AST 17 07/25/2023    ALT 18 07/25/2023    ALKPHOS 105 07/25/2023    PROT 7.9 09/05/2015    BILITOT 0.47 09/05/2015    EGFR 76 07/25/2023       No results found for: "IRON", "TIBC", "FERRITIN"    No results found for: "[id number]"      ROS: Review of Systems   Constitutional: Negative for appetite change, chills, diaphoresis, fatigue and unexpected weight change. HENT:   Negative for mouth sores, nosebleeds, sore throat, trouble swallowing and voice change. Eyes: Negative for eye problems and icterus. Respiratory: Negative for chest tightness, cough, hemoptysis and wheezing. Cardiovascular: Negative for chest pain, leg swelling and palpitations. Gastrointestinal: Negative for abdominal distention, abdominal pain, blood in stool, constipation, diarrhea, nausea and vomiting. Endocrine: Negative for hot flashes. Genitourinary: Negative for bladder incontinence, difficulty urinating, dyspareunia, dysuria and frequency. Musculoskeletal: Positive for arthralgias. Negative for back pain, gait problem, neck pain and neck stiffness. Skin: Negative for itching and rash. Neurological: Negative for dizziness, gait problem, headaches, numbness, seizures and speech difficulty. Hematological: Negative for adenopathy. Does not bruise/bleed easily. Psychiatric/Behavioral: Negative for decreased concentration, depression, sleep disturbance and suicidal ideas. The patient is not nervous/anxious.            Current Medications: Reviewed  Allergies: Reviewed  PMH/FH/SH:  Reviewed      Physical Exam:    There is no height or weight on file to calculate BSA. Wt Readings from Last 3 Encounters:   05/09/23 79.7 kg (175 lb 12.8 oz)   05/01/23 78.8 kg (173 lb 12.8 oz)   11/08/22 80.9 kg (178 lb 6.4 oz)        Temp Readings from Last 3 Encounters:   05/09/23 98.4 °F (36.9 °C)   11/08/22 99.5 °F (37.5 °C) (Tympanic)   09/13/22 (!) 97.2 °F (36.2 °C)        BP Readings from Last 3 Encounters:   05/09/23 142/86   05/01/23 134/70   11/08/22 132/78         Pulse Readings from Last 3 Encounters:   05/09/23 79   05/01/23 66   11/08/22 86        Physical Exam  Vitals reviewed. Constitutional:       General: She is not in acute distress. Appearance: She is well-developed. She is not diaphoretic. HENT:      Head: Normocephalic and atraumatic. Eyes:      Conjunctiva/sclera: Conjunctivae normal.   Neck:      Trachea: No tracheal deviation. Cardiovascular:      Rate and Rhythm: Normal rate and regular rhythm. Heart sounds: No murmur heard. No friction rub. No gallop. Pulmonary:      Effort: Pulmonary effort is normal. No respiratory distress. Breath sounds: Normal breath sounds. No wheezing or rales. Chest:      Chest wall: No tenderness. Abdominal:      General: There is no distension. Palpations: Abdomen is soft. Tenderness: There is no abdominal tenderness. Musculoskeletal:      Cervical back: Normal range of motion and neck supple. Right lower leg: No edema. Left lower leg: No edema. Lymphadenopathy:      Cervical: No cervical adenopathy. Skin:     General: Skin is warm and dry. Coloration: Skin is not pale. Findings: No erythema. Neurological:      Mental Status: She is alert and oriented to person, place, and time. Psychiatric:         Behavior: Behavior normal.         Thought Content: Thought content normal.         Judgment: Judgment normal.         ECOG:    Goals and Barriers:  Current Goal: Minimize effects of disease. Barriers: None.       Patient's Capacity to Self Care:  Patient is able to self care.     Code Status: [unfilled]

## 2023-08-21 DIAGNOSIS — I10 BENIGN HYPERTENSION: ICD-10-CM

## 2023-08-21 DIAGNOSIS — I10 BENIGN ESSENTIAL HYPERTENSION: ICD-10-CM

## 2023-08-21 RX ORDER — LISINOPRIL 20 MG/1
20 TABLET ORAL DAILY
Qty: 90 TABLET | Refills: 1 | Status: SHIPPED | OUTPATIENT
Start: 2023-08-21

## 2023-08-21 RX ORDER — AMLODIPINE BESYLATE 5 MG/1
10 TABLET ORAL DAILY
Qty: 180 TABLET | Refills: 1 | Status: SHIPPED | OUTPATIENT
Start: 2023-08-21

## 2023-08-28 DIAGNOSIS — E03.9 ACQUIRED HYPOTHYROIDISM: ICD-10-CM

## 2023-08-28 RX ORDER — LEVOTHYROXINE SODIUM 112 UG/1
112 TABLET ORAL DAILY
Qty: 90 TABLET | Refills: 1 | Status: SHIPPED | OUTPATIENT
Start: 2023-08-28

## 2023-10-08 DIAGNOSIS — I21.3 STEMI (ST ELEVATION MYOCARDIAL INFARCTION) (HCC): ICD-10-CM

## 2023-10-22 NOTE — PROGRESS NOTES
Cardiology Follow Up    Leta Cheek  [birth year]  [id number]  [address] [facility]  [phone]    1. Essential (primary) hypertension        2. Hyperlipidemia, unspecified hyperlipidemia type        3. S/P drug eluting coronary stent placement        4. STEMI (ST elevation myocardial infarction) (HCC)  nitroglycerin (NITROSTAT) 0.4 mg SL tablet    DISCONTINUED: nitroglycerin (NITROSTAT) 0.4 mg SL tablet          Interval History:  Patient is here for a follow-up visit. She was hospitalized January 2022 with an RCA STEMI. Cardiac catheterization done January 28, 2022 demonstrated a closed RCA with successful PCI and placement of a HANH. Other vessels had mild diffuse disease. Echocardiogram September 2022 demonstrated LVEF of 60% with mild inferobasal hypokinesis. AV sclerosis was noted. Compared to prior echo done January 2022,  LV function appears improved. Lipid profile done 5/2023 demonstrated total cholesterol of 131 with an HDL of 47 and a calculated LDL of 65. Patient is on atorvastatin. Patient has essential thrombocythemia and is on hydroxyurea and followed by Heme/Onc. Patient has had no chest pain or significant dyspnea. Vital signs are stable today.     Patient Active Problem List   Diagnosis   • Myeloproliferative disorder [facility])   • Benign essential hypertension   • Essential thrombocythemia ([address])   • Fatty liver   • Hypothyroidism   • Insomnia   • Osteopenia   • STEMI involving right coronary artery (HCC)   • Presence of drug-eluting stent in right coronary artery   • Vertigo, benign paroxysmal, left   • Atherosclerosis of native coronary artery of native heart with angina pectoris [facility])     Past Medical History:   Diagnosis Date   • Biliary colic     last assessed: 01/05/16   • Cholecystitis    • Disease of thyroid gland     hypo   • Hematuria     last assessed: 05/05/17   • Hypercalcemia 2019   • Hypertension    • Hypokalemia 2016   • Impaired fasting glucose 2013   • Leukocytosis     last assessed: 16   • Obesity (BMI 30.0-34.9) 2019   • Prediabetes 11/10/2016   • Toxic nodular goiter    • Uterine fibroid      Social History     Socioeconomic History   • Marital status:      Spouse name: Not on file   • Number of children: Not on file   • Years of education: Not on file   • Highest education level: Not on file   Occupational History   • Occupation: Retired   Tobacco Use   • Smoking status: Former     Packs/day: 0.50     Years: 40.00     Total pack years: 20.00     Types: Cigarettes     Quit date: 2012     Years since quittin.8   • Smokeless tobacco: Never   Vaping Use   • Vaping Use: Never used   Substance and Sexual Activity   • Alcohol use: No   • Drug use: No   • Sexual activity: Never   Other Topics Concern   • Not on file   Social History Narrative   • Not on file     Social Determinants of Health     Financial Resource Strain: Low Risk  (2022)    Overall Financial Resource Strain (CARDIA)    • Difficulty of Paying Living Expenses: Not hard at all   Food Insecurity: No Food Insecurity (2022)    Hunger Vital Sign    • Worried About Running Out of Food in the Last Year: Never true    • Ran Out of Food in the Last Year: Never true   Transportation Needs: No Transportation Needs (2022)    PRAPARE - Transportation    • Lack of Transportation (Medical): No    • Lack of Transportation (Non-Medical):  No   Physical Activity: Not on file   Stress: Not on file   Social Connections: Not on file   Intimate Partner Violence: Not on file   Housing Stability: Unknown (2022)    Housing Stability Vital Sign    • Unable to Pay for Housing in the Last Year: No    • Number of Places Lived in the Last Year: Not on file    • Unstable Housing in the Last Year: No      Family History   Problem Relation Age of Onset   • Hypothyroidism Mother • Heart disease Brother         Cardiac disorder     Past Surgical History:   Procedure Laterality Date   • CARDIAC CATHETERIZATION N/A 1/28/2022    Procedure: Cardiac pci;  Surgeon: Veronica Lopez MD;  Location: [facility]; Service: Cardiology   • HYSTERECTOMY      INGRIS, with removal of both ovaries   • CT LAPAROSCOPY SURG CHOLECYSTECTOMY N/A 3/9/2016    Procedure: LAPAROSCOPIC CHOLECYSTECTOMY ;  Surgeon: Diane Ivy MD;  Location: [facility] MAIN OR;  Service: General   • TUBAL LIGATION         Current Outpatient Medications:   •  amLODIPine (NORVASC) 5 mg tablet, Take 2 tablets (10 mg total) by mouth daily, Disp: 180 tablet, Rfl: 1  •  aspirin 81 MG tablet, Take 81 mg by mouth daily. , Disp: , Rfl:   •  atorvastatin (LIPITOR) 40 mg tablet, Take 1 tablet (40 mg total) by mouth daily, Disp: 90 tablet, Rfl: 1  •  clopidogrel (PLAVIX) 75 mg tablet, Take 1 tablet by mouth once daily, Disp: 90 tablet, Rfl: 2  •  hydroxyurea (HYDREA) 500 mg capsule, TAKE 1 CAPSULE BY MOUTH EVERY OTHER DAY, Disp: 45 capsule, Rfl: 3  •  levothyroxine 112 mcg tablet, Take 1 tablet (112 mcg total) by mouth daily, Disp: 90 tablet, Rfl: 1  •  lisinopril (ZESTRIL) 20 mg tablet, Take 1 tablet (20 mg total) by mouth daily, Disp: 90 tablet, Rfl: 1  •  metoprolol tartrate (LOPRESSOR) 25 mg tablet, TAKE 1 TABLET BY MOUTH EVERY 12 HOURS, Disp: 90 tablet, Rfl: 0  •  nitroglycerin (NITROSTAT) 0.4 mg SL tablet, Place 1 tablet (0.4 mg total) under the tongue every 5 (five) minutes as needed for chest pain, Disp: 30 tablet, Rfl: 3  No Known Allergies    Labs:not applicable  Imaging: No results found. Review of Systems:  Review of Systems   All other systems reviewed and are negative. Physical Exam:  /76 (BP Location: Left arm, Patient Position: Sitting, Cuff Size: Standard)   Pulse 73   Wt 81.6 kg (180 lb)   SpO2 98%   BMI 32.40 kg/m²   Physical Exam  Vitals reviewed. Constitutional:       Appearance: She is well-developed.    HENT: Head: Normocephalic and atraumatic. Eyes:      Conjunctiva/sclera: Conjunctivae normal.      Pupils: Pupils are equal, round, and reactive to light. Cardiovascular:      Rate and Rhythm: Normal rate. Heart sounds: Normal heart sounds. Pulmonary:      Effort: Pulmonary effort is normal.      Breath sounds: Normal breath sounds. Musculoskeletal:      Cervical back: Normal range of motion and neck supple. Skin:     General: Skin is warm and dry. Neurological:      Mental Status: She is alert and oriented to person, place, and time. Discussion/Summary:I will continue the patient's present medical regimen. The patient appears well compensated. I have asked the patient to call if there is a problem in the interim otherwise I will see the patient in six months time.

## 2023-10-30 ENCOUNTER — OFFICE VISIT (OUTPATIENT)
Dept: CARDIOLOGY CLINIC | Facility: CLINIC | Age: 78
End: 2023-10-30
Payer: COMMERCIAL

## 2023-10-30 VITALS
OXYGEN SATURATION: 98 % | HEART RATE: 73 BPM | BODY MASS INDEX: 32.4 KG/M2 | DIASTOLIC BLOOD PRESSURE: 76 MMHG | WEIGHT: 180 LBS | SYSTOLIC BLOOD PRESSURE: 132 MMHG

## 2023-10-30 DIAGNOSIS — E78.5 HYPERLIPIDEMIA, UNSPECIFIED HYPERLIPIDEMIA TYPE: ICD-10-CM

## 2023-10-30 DIAGNOSIS — I10 ESSENTIAL (PRIMARY) HYPERTENSION: Primary | ICD-10-CM

## 2023-10-30 DIAGNOSIS — Z95.5 S/P DRUG ELUTING CORONARY STENT PLACEMENT: ICD-10-CM

## 2023-10-30 DIAGNOSIS — I21.3 STEMI (ST ELEVATION MYOCARDIAL INFARCTION) (HCC): ICD-10-CM

## 2023-10-30 PROCEDURE — 1160F RVW MEDS BY RX/DR IN RCRD: CPT | Performed by: INTERNAL MEDICINE

## 2023-10-30 PROCEDURE — 3075F SYST BP GE 130 - 139MM HG: CPT | Performed by: INTERNAL MEDICINE

## 2023-10-30 PROCEDURE — 99214 OFFICE O/P EST MOD 30 MIN: CPT | Performed by: INTERNAL MEDICINE

## 2023-10-30 PROCEDURE — 1159F MED LIST DOCD IN RCRD: CPT | Performed by: INTERNAL MEDICINE

## 2023-10-30 PROCEDURE — 3078F DIAST BP <80 MM HG: CPT | Performed by: INTERNAL MEDICINE

## 2023-10-30 RX ORDER — NITROGLYCERIN 0.4 MG/1
0.4 TABLET SUBLINGUAL
Qty: 30 TABLET | Refills: 3 | Status: SHIPPED | OUTPATIENT
Start: 2023-10-30

## 2023-10-30 RX ORDER — NITROGLYCERIN 0.4 MG/1
0.4 TABLET SUBLINGUAL
Qty: 90 TABLET | Refills: 0 | Status: SHIPPED | OUTPATIENT
Start: 2023-10-30 | End: 2023-10-30 | Stop reason: SDUPTHER

## 2023-10-30 NOTE — PATIENT INSTRUCTIONS
I will continue the patient's present medical regimen. The patient appears well compensated. I have asked the patient to call if there is a problem in the interim otherwise I will see the patient in six months time.
The patient is a 40y Female complaining of medical evaluation.

## 2023-11-07 ENCOUNTER — RA CDI HCC (OUTPATIENT)
Dept: OTHER | Facility: HOSPITAL | Age: 78
End: 2023-11-07

## 2023-11-07 NOTE — PROGRESS NOTES
[address] [facility] coding opportunities       Chart reviewed, no opportunity found: [id number]        Patients Insurance     Medicare Insurance: [employer]

## 2023-11-19 DIAGNOSIS — I21.3 STEMI (ST ELEVATION MYOCARDIAL INFARCTION) (HCC): ICD-10-CM

## 2023-11-22 DIAGNOSIS — D47.3 ESSENTIAL THROMBOCYTHEMIA (HCC): ICD-10-CM

## 2023-11-22 RX ORDER — HYDROXYUREA 500 MG/1
CAPSULE ORAL
Qty: 45 CAPSULE | Refills: 5 | Status: SHIPPED | OUTPATIENT
Start: 2023-11-22

## 2023-12-04 ENCOUNTER — APPOINTMENT (OUTPATIENT)
Dept: LAB | Age: 78
End: 2023-12-04
Payer: COMMERCIAL

## 2023-12-04 DIAGNOSIS — I10 BENIGN ESSENTIAL HYPERTENSION: ICD-10-CM

## 2023-12-04 DIAGNOSIS — K76.0 FATTY LIVER: ICD-10-CM

## 2023-12-04 LAB
ALBUMIN SERPL BCP-MCNC: 4.1 G/DL (ref 3.5–5)
ALP SERPL-CCNC: 86 U/L (ref 34–104)
ALT SERPL W P-5'-P-CCNC: 12 U/L (ref 7–52)
ANION GAP SERPL CALCULATED.3IONS-SCNC: 7 MMOL/L
AST SERPL W P-5'-P-CCNC: 15 U/L (ref 13–39)
BILIRUB SERPL-MCNC: 0.36 MG/DL (ref 0.2–1)
BUN SERPL-MCNC: 11 MG/DL (ref 5–25)
CALCIUM SERPL-MCNC: 9.8 MG/DL (ref 8.4–10.2)
CHLORIDE SERPL-SCNC: 104 MMOL/L (ref 96–108)
CO2 SERPL-SCNC: 27 MMOL/L (ref 21–32)
CREAT SERPL-MCNC: 0.71 MG/DL (ref 0.6–1.3)
GFR SERPL CREATININE-BSD FRML MDRD: 82 ML/MIN/1.73SQ M
GLUCOSE P FAST SERPL-MCNC: 101 MG/DL (ref 65–99)
POTASSIUM SERPL-SCNC: 4.2 MMOL/L (ref 3.5–5.3)
PROT SERPL-MCNC: 6.9 G/DL (ref 6.4–8.4)
SODIUM SERPL-SCNC: 138 MMOL/L (ref 135–147)

## 2023-12-04 PROCEDURE — 80053 COMPREHEN METABOLIC PANEL: CPT

## 2023-12-04 PROCEDURE — 36415 COLL VENOUS BLD VENIPUNCTURE: CPT

## 2023-12-06 ENCOUNTER — OFFICE VISIT (OUTPATIENT)
Dept: INTERNAL MEDICINE CLINIC | Facility: CLINIC | Age: 78
End: 2023-12-06
Payer: COMMERCIAL

## 2023-12-06 VITALS
BODY MASS INDEX: 32 KG/M2 | RESPIRATION RATE: 16 BRPM | SYSTOLIC BLOOD PRESSURE: 142 MMHG | HEIGHT: 63 IN | HEART RATE: 79 BPM | TEMPERATURE: 98 F | DIASTOLIC BLOOD PRESSURE: 70 MMHG | WEIGHT: 180.6 LBS | OXYGEN SATURATION: 97 %

## 2023-12-06 DIAGNOSIS — M85.80 OSTEOPENIA, UNSPECIFIED LOCATION: ICD-10-CM

## 2023-12-06 DIAGNOSIS — D47.3 ESSENTIAL THROMBOCYTHEMIA (HCC): ICD-10-CM

## 2023-12-06 DIAGNOSIS — H81.12 VERTIGO, BENIGN PAROXYSMAL, LEFT: ICD-10-CM

## 2023-12-06 DIAGNOSIS — Z00.00 MEDICARE ANNUAL WELLNESS VISIT, SUBSEQUENT: ICD-10-CM

## 2023-12-06 DIAGNOSIS — E03.9 ACQUIRED HYPOTHYROIDISM: ICD-10-CM

## 2023-12-06 DIAGNOSIS — R73.01 IFG (IMPAIRED FASTING GLUCOSE): ICD-10-CM

## 2023-12-06 DIAGNOSIS — I10 BENIGN ESSENTIAL HYPERTENSION: Primary | ICD-10-CM

## 2023-12-06 DIAGNOSIS — N39.46 MIXED STRESS AND URGE URINARY INCONTINENCE: ICD-10-CM

## 2023-12-06 DIAGNOSIS — Z12.31 ENCOUNTER FOR SCREENING MAMMOGRAM FOR BREAST CANCER: ICD-10-CM

## 2023-12-06 DIAGNOSIS — G89.29 CHRONIC RIGHT-SIDED LOW BACK PAIN WITHOUT SCIATICA: ICD-10-CM

## 2023-12-06 DIAGNOSIS — M54.50 CHRONIC RIGHT-SIDED LOW BACK PAIN WITHOUT SCIATICA: ICD-10-CM

## 2023-12-06 DIAGNOSIS — Z23 ENCOUNTER FOR IMMUNIZATION: ICD-10-CM

## 2023-12-06 PROBLEM — I21.11 STEMI INVOLVING RIGHT CORONARY ARTERY (HCC): Status: RESOLVED | Noted: 2022-01-28 | Resolved: 2023-12-06

## 2023-12-06 PROCEDURE — 90662 IIV NO PRSV INCREASED AG IM: CPT | Performed by: INTERNAL MEDICINE

## 2023-12-06 PROCEDURE — G0439 PPPS, SUBSEQ VISIT: HCPCS | Performed by: INTERNAL MEDICINE

## 2023-12-06 PROCEDURE — G0008 ADMIN INFLUENZA VIRUS VAC: HCPCS | Performed by: INTERNAL MEDICINE

## 2023-12-06 PROCEDURE — 99214 OFFICE O/P EST MOD 30 MIN: CPT | Performed by: INTERNAL MEDICINE

## 2023-12-06 RX ORDER — LISINOPRIL 40 MG/1
40 TABLET ORAL DAILY
Qty: 90 TABLET | Refills: 1
Start: 2023-12-06

## 2023-12-06 NOTE — ASSESSMENT & PLAN NOTE
-fair control  -increase lisinopril to 40mg daily, continue amlodipine 5mg two tabs daily and metoprolol tartrate 25mg q12  -monitor BMP

## 2023-12-06 NOTE — PATIENT INSTRUCTIONS
Medicare Preventive Visit Patient Instructions  Thank you for completing your Welcome to Medicare Visit or Medicare Annual Wellness Visit today. Your next wellness visit will be due in one year (12/6/2024). The screening/preventive services that you may require over the next 5-10 years are detailed below. Some tests may not apply to you based off risk factors and/or age. Screening tests ordered at today's visit but not completed yet may show as past due. Also, please note that scanned in results may not display below. Preventive Screenings:  Service Recommendations Previous Testing/Comments   Colorectal Cancer Screening  * Colonoscopy    * Fecal Occult Blood Test (FOBT)/Fecal Immunochemical Test (FIT)  * Fecal DNA/Cologuard Test  * Flexible Sigmoidoscopy Age: 43-73 years old   Colonoscopy: every 10 years (may be performed more frequently if at higher risk)  OR  FOBT/FIT: every 1 year  OR  Cologuard: every 3 years  OR  Sigmoidoscopy: every 5 years  Screening may be recommended earlier than age 39 if at higher risk for colorectal cancer. Also, an individualized decision between you and your healthcare provider will decide whether screening between the ages of 77-80 would be appropriate. Colonoscopy: 01/16/2013  FOBT/FIT: Not on file  Cologuard: Not on file  Sigmoidoscopy: Not on file          Breast Cancer Screening Age: 36 years old  Frequency: every 1-2 years  Not required if history of left and right mastectomy Mammogram: 01/24/2020        Cervical Cancer Screening Between the ages of 21-29, pap smear recommended once every 3 years. Between the ages of 32-69, can perform pap smear with HPV co-testing every 5 years.    Recommendations may differ for women with a history of total hysterectomy, cervical cancer, or abnormal pap smears in past. Pap Smear: Not on file    Screening Not Indicated   Hepatitis C Screening Once for adults born between 1945 and 1965  More frequently in patients at high risk for Hepatitis C Hep C Antibody: 10/14/2018    Screening Current   Diabetes Screening 1-2 times per year if you're at risk for diabetes or have pre-diabetes Fasting glucose: 101 mg/dL (12/4/2023)  A1C: 5.3 % (5/1/2022)  Screening Current   Cholesterol Screening Once every 5 years if you don't have a lipid disorder. May order more often based on risk factors. Lipid panel: 05/07/2023    Screening Not Indicated  History Lipid Disorder     Other Preventive Screenings Covered by Medicare:  Abdominal Aortic Aneurysm (AAA) Screening: covered once if your at risk. You're considered to be at risk if you have a family history of AAA. Lung Cancer Screening: covers low dose CT scan once per year if you meet all of the following conditions: (1) Age 48-67; (2) No signs or symptoms of lung cancer; (3) Current smoker or have quit smoking within the last 15 years; (4) You have a tobacco smoking history of at least 20 pack years (packs per day multiplied by number of years you smoked); (5) You get a written order from a healthcare provider. Glaucoma Screening: covered annually if you're considered high risk: (1) You have diabetes OR (2) Family history of glaucoma OR (3)  aged 48 and older OR (3)  American aged 72 and older  Osteoporosis Screening: covered every 2 years if you meet one of the following conditions: (1) You're estrogen deficient and at risk for osteoporosis based off medical history and other findings; (2) Have a vertebral abnormality; (3) On glucocorticoid therapy for more than 3 months; (4) Have primary hyperparathyroidism; (5) On osteoporosis medications and need to assess response to drug therapy. Last bone density test (DXA Scan): Not on file. HIV Screening: covered annually if you're between the age of 14-79. Also covered annually if you are younger than 13 and older than 72 with risk factors for HIV infection.  For pregnant patients, it is covered up to 3 times per pregnancy. Immunizations:  Immunization Recommendations   Influenza Vaccine Annual influenza vaccination during flu season is recommended for all persons aged >= 6 months who do not have contraindications   Pneumococcal Vaccine   * Pneumococcal conjugate vaccine = PCV13 (Prevnar 13), PCV15 (Vaxneuvance), PCV20 (Prevnar 20)  * Pneumococcal polysaccharide vaccine = PPSV23 (Pneumovax) Adults 97-18 yo with certain risk factors or if 69+ yo  If never received any pneumonia vaccine: recommend Prevnar 20 (PCV20)  Give PCV20 if previously received 1 dose of PCV13 or PPSV23   Hepatitis B Vaccine 3 dose series if at intermediate or high risk (ex: diabetes, end stage renal disease, liver disease)   Respiratory syncytial virus (RSV) Vaccine - COVERED BY MEDICARE PART D  * RSVPreF3 (Arexvy) CDC recommends that adults 61years of age and older may receive a single dose of RSV vaccine using shared clinical decision-making (SCDM)   Tetanus (Td) Vaccine - COST NOT COVERED BY MEDICARE PART B Following completion of primary series, a booster dose should be given every 10 years to maintain immunity against tetanus. Td may also be given as tetanus wound prophylaxis. Tdap Vaccine - COST NOT COVERED BY MEDICARE PART B Recommended at least once for all adults. For pregnant patients, recommended with each pregnancy. Shingles Vaccine (Shingrix) - COST NOT COVERED BY MEDICARE PART B  2 shot series recommended in those 19 years and older who have or will have weakened immune systems or those 50 years and older     Health Maintenance Due:      Topic Date Due   • Lung Cancer Screening  Never done   • Colorectal Cancer Screening  01/16/2014   • Breast Cancer Screening: Mammogram  01/24/2021   • Hepatitis C Screening  Completed     Immunizations Due:      Topic Date Due   • COVID-19 Vaccine (2 - Booster for Esther series) 05/31/2021   • Influenza Vaccine (1) 09/01/2023     Advance Directives   What are advance directives?   Advance directives are legal documents that state your wishes and plans for medical care. These plans are made ahead of time in case you lose your ability to make decisions for yourself. Advance directives can apply to any medical decision, such as the treatments you want, and if you want to donate organs. What are the types of advance directives? There are many types of advance directives, and each state has rules about how to use them. You may choose a combination of any of the following:  Living will: This is a written record of the treatment you want. You can also choose which treatments you do not want, which to limit, and which to stop at a certain time. This includes surgery, medicine, IV fluid, and tube feedings. Durable power of  for [facility]): This is a written record that states who you want to make healthcare choices for you when you are unable to make them for yourself. This person, called a proxy, is usually a family member or a friend. You may choose more than 1 proxy. Do not resuscitate (DNR) order:  A DNR order is used in case your heart stops beating or you stop breathing. It is a request not to have certain forms of treatment, such as CPR. A DNR order may be included in other types of advance directives. Medical directive: This covers the care that you want if you are in a coma, near death, or unable to make decisions for yourself. You can list the treatments you want for each condition. Treatment may include pain medicine, surgery, blood transfusions, dialysis, IV or tube feedings, and a ventilator (breathing machine). Values history: This document has questions about your views, beliefs, and how you feel and think about life. This information can help others choose the care that you would choose. Why are advance directives important? An advance directive helps you control your care. Although spoken wishes may be used, it is better to have your wishes written down.  Spoken wishes can be misunderstood, or not followed. Treatments may be given even if you do not want them. An advance directive may make it easier for your family to make difficult choices about your care. Urinary Incontinence   Urinary incontinence (UI)  is when you lose control of your bladder. UI develops because your bladder cannot store or empty urine properly. The 3 most common types of UI are stress incontinence, urge incontinence, or both. Medicines:   May be given to help strengthen your bladder control. Report any side effects of medication to your healthcare provider. Do pelvic muscle exercises often:  Your pelvic muscles help you stop urinating. Squeeze these muscles tight for 5 seconds, then relax for 5 seconds. Gradually work up to squeezing for 10 seconds. Do 3 sets of 15 repetitions a day, or as directed. This will help strengthen your pelvic muscles and improve bladder control. Train your bladder:  Go to the bathroom at set times, such as every 2 hours, even if you do not feel the urge to go. You can also try to hold your urine when you feel the urge to go. For example, hold your urine for 5 minutes when you feel the urge to go. As that becomes easier, hold your urine for 10 minutes. Self-care:   Keep a UI record. Write down how often you leak urine and how much you leak. Make a note of what you were doing when you leaked urine. Drink liquids as directed. You may need to limit the amount of liquid you drink to help control your urine leakage. Do not drink any liquid right before you go to bed. Limit or do not have drinks that contain caffeine or alcohol. Prevent constipation. Eat a variety of high-fiber foods. Good examples are high-fiber cereals, beans, vegetables, and whole-grain breads. Walking is the best way to trigger your intestines to have a bowel movement. Exercise regularly and maintain a healthy weight.   Weight loss and exercise will decrease pressure on your bladder and help you control your leakage. Use a catheter as directed  to help empty your bladder. A catheter is a tiny, plastic tube that is put into your bladder to drain your urine. Go to behavior therapy as directed. Behavior therapy may be used to help you learn to control your urge to urinate. Weight Management   Why it is important to manage your weight:  Being overweight increases your risk of health conditions such as heart disease, high blood pressure, type 2 diabetes, and certain types of cancer. It can also increase your risk for osteoarthritis, sleep apnea, and other respiratory problems. Aim for a slow, steady weight loss. Even a small amount of weight loss can lower your risk of health problems. How to lose weight safely:  A safe and healthy way to lose weight is to eat fewer calories and get regular exercise. You can lose up about 1 pound a week by decreasing the number of calories you eat by 500 calories each day. Healthy meal plan for weight management:  A healthy meal plan includes a variety of foods, contains fewer calories, and helps you stay healthy. A healthy meal plan includes the following:  Eat whole-grain foods more often. A healthy meal plan should contain fiber. Fiber is the part of grains, fruits, and vegetables that is not broken down by your body. Whole-grain foods are healthy and provide extra fiber in your diet. Some examples of whole-grain foods are whole-wheat breads and pastas, oatmeal, brown rice, and bulgur. Eat a variety of vegetables every day. Include dark, leafy greens such as spinach, kale, zoila greens, and mustard greens. Eat yellow and orange vegetables such as carrots, sweet potatoes, and winter squash. Eat a variety of fruits every day. Choose fresh or canned fruit (canned in its own juice or light syrup) instead of juice. Fruit juice has very little or no fiber. Eat low-fat dairy foods. Drink fat-free (skim) milk or 1% milk.  Eat fat-free yogurt and low-fat cottage cheese. Try low-fat cheeses such as mozzarella and other reduced-fat cheeses. Choose meat and other protein foods that are low in fat. Choose beans or other legumes such as split peas or lentils. Choose fish, skinless poultry (chicken or turkey), or lean cuts of red meat (beef or pork). Before you cook meat or poultry, cut off any visible fat. Use less fat and oil. Try baking foods instead of frying them. Add less fat, such as margarine, sour cream, regular salad dressing and mayonnaise to foods. Eat fewer high-fat foods. Some examples of high-fat foods include french fries, doughnuts, ice cream, and cakes. Eat fewer sweets. Limit foods and drinks that are high in sugar. This includes candy, cookies, regular soda, and sweetened drinks. Exercise:  Exercise at least 30 minutes per day on most days of the week. Some examples of exercise include walking, biking, dancing, and swimming. You can also fit in more physical activity by taking the stairs instead of the elevator or parking farther away from stores. Ask your healthcare provider about the best exercise plan for you. © Copyright Figure 1 2018 Information is for End User's use only and may not be sold, redistributed or otherwise used for commercial purposes. All illustrations and images included in CareNotes® are the copyrighted property of [employer] or [address] [place] [address]      Urinary Incontinence   AMBULATORY CARE:   Urinary incontinence (UI)  is loss of bladder control. UI develops because your bladder cannot store or empty urine properly. The 3 most common types of UI are stress incontinence, urge incontinence, or both. Common symptoms include the following: You feel like your bladder does not empty completely when you urinate. You urinate often and need to urinate immediately. You leak urine when you sleep, or you wake up with the urge to urinate. You leak urine when you cough, sneeze, exercise, or laugh.     Seek care immediately if:   You have severe pain. You are confused or cannot think clearly. You see blood in your urine. You have pain when you urinate. You have new or worse pain, even after treatment. Call your doctor if:   You have a fever. Your mouth feels dry or you have vision changes. Your urine is cloudy or smells bad. You have questions or concerns about your condition or care. Treatment  may include any of the following:  Medicines  may be given to help strengthen your bladder control. Electrical stimulation  is used to send a small amount of electrical energy to your pelvic floor muscles. This helps control your bladder function. Electrodes may be placed outside your body or in your rectum. For women, the electrodes may be placed in the vagina. A bulking agent  may be injected into the wall of your urethra to make it thicker. This helps keep your urethra closed and decreases urine leakage. Devices  such as a clamp, pessary, or tampon may help stop urine leaks. Ask your healthcare provider for more information about these and other devices. Surgery  may be needed if other treatments do not work. Several types of surgery can help improve your bladder control. Ask your provider for more information about the surgery you may need. Self-care:   Keep a UI record. Write down how often you leak urine and how much you leak. Make a note of what you were doing when you leaked urine. Drink liquids as directed. Ask your healthcare provider how much liquid to drink each day and which liquids are best for you. You may need to limit the amount of liquid you drink to help control your urine leakage. Do not drink any liquid right before you go to bed. Limit or do not have drinks that contain caffeine or alcohol. Prevent constipation. Eat a variety of high-fiber foods. Good examples are high-fiber cereals, beans, vegetables, and whole-grain breads.  Prune juice may help make your bowel movement softer. Walking is the best way to trigger your intestines to have a bowel movement. Exercise regularly and maintain a healthy weight. Ask your provider how much you should weigh and about the best exercise plan for you. Weight loss and exercise will decrease pressure on your bladder and help you control your leakage. Ask your provider to help you create a weight loss plan, if needed. Use a catheter as directed  to help empty your bladder. A catheter is a tiny, plastic tube that is put into your bladder to drain your urine. Your provider may tell you to use a catheter to prevent your bladder from getting too full and leaking urine. Go to behavior therapy as directed. Behavior therapy may be used to help you learn to control your urge to urinate. Follow up with your doctor as directed:  Write down your questions so you remember to ask them during your visits. © Copyright [employer] 2023 Information is for End User's use only and may not be sold, redistributed or otherwise used for commercial purposes. The above information is an  only. It is not intended as medical advice for individual conditions or treatments. Talk to your doctor, nurse or pharmacist before following any medical regimen to see if it is safe and effective for you. Kegel Exercises for Women   AMBULATORY CARE:   Kegel exercises  help strengthen your pelvic muscles. Pelvic muscles hold your pelvic organs, such as your bladder and uterus, in place. Kegel exercises help prevent or control certain conditions, such as urine incontinence (leakage) or uterine prolapse. Call your doctor or physical therapist if:   You cannot feel your muscles tighten or relax. You continue to leak urine. You have questions or concerns about your condition or care. Use the correct muscles:  Pelvic muscles are the muscles you use to control urine flow.  To target these muscles, stop and start the flow of urine several times. This will help you become familiar with how it feels to tighten and relax these muscles. How to do Kegel exercises:   Get into a comfortable position. You may lie down, stand up, or sit down to do these exercises. When you first try to do these exercises, it may be easier if you lie down. Tighten or squeeze your pelvic muscles slowly. It may feel like you are trying to hold back urine or gas. Hold this position for 3 seconds. Relax for 3 seconds. Repeat this cycle 10 times. Do not hold your breath when you do Kegel exercises. Keep your stomach, back, and leg muscles relaxed. Do 10 sets of Kegel exercises, at least 3 times a day. When you know how to do Kegel exercises, use different positions. This will help to strengthen your pelvic muscles as much as possible. You can do these exercises while you lie on the floor, watch TV, or while you stand. Tighten your pelvic muscles before you sneeze, cough, or lift to prevent urine leakage. You may notice improved bladder control within about 6 weeks. Follow up with your doctor or physical therapist as directed:  Write down your questions so you remember to ask them during your visits. © Copyright Mandy Delgadillo 2023 Information is for End User's use only and may not be sold, redistributed or otherwise used for commercial purposes. The above information is an  only. It is not intended as medical advice for individual conditions or treatments. Talk to your doctor, nurse or pharmacist before following any medical regimen to see if it is safe and effective for you. Medicare Preventive Visit Patient Instructions  Thank you for completing your Welcome to Medicare Visit or Medicare Annual Wellness Visit today. Your next wellness visit will be due in one year (12/6/2024). The screening/preventive services that you may require over the next 5-10 years are detailed below.  Some tests may not apply to you based off risk factors and/or age. Screening tests ordered at today's visit but not completed yet may show as past due. Also, please note that scanned in results may not display below. Preventive Screenings:  Service Recommendations Previous Testing/Comments   Colorectal Cancer Screening  * Colonoscopy    * Fecal Occult Blood Test (FOBT)/Fecal Immunochemical Test (FIT)  * Fecal DNA/Cologuard Test  * Flexible Sigmoidoscopy Age: 43-73 years old   Colonoscopy: every 10 years (may be performed more frequently if at higher risk)  OR  FOBT/FIT: every 1 year  OR  Cologuard: every 3 years  OR  Sigmoidoscopy: every 5 years  Screening may be recommended earlier than age 39 if at higher risk for colorectal cancer. Also, an individualized decision between you and your healthcare provider will decide whether screening between the ages of 77-80 would be appropriate. Colonoscopy: 01/16/2013  FOBT/FIT: Not on file  Cologuard: Not on file  Sigmoidoscopy: Not on file          Breast Cancer Screening Age: 36 years old  Frequency: every 1-2 years  Not required if history of left and right mastectomy Mammogram: 01/24/2020        Cervical Cancer Screening Between the ages of 21-29, pap smear recommended once every 3 years. Between the ages of 32-69, can perform pap smear with HPV co-testing every 5 years. Recommendations may differ for women with a history of total hysterectomy, cervical cancer, or abnormal pap smears in past. Pap Smear: Not on file    Screening Not Indicated   Hepatitis C Screening Once for adults born between 1945 and 1965  More frequently in patients at high risk for Hepatitis C Hep C Antibody: 10/14/2018    Screening Current   Diabetes Screening 1-2 times per year if you're at risk for diabetes or have pre-diabetes Fasting glucose: 101 mg/dL (12/4/2023)  A1C: 5.3 % (5/1/2022)  Screening Current   Cholesterol Screening Once every 5 years if you don't have a lipid disorder. May order more often based on risk factors.  Lipid panel: 05/07/2023    Screening Not Indicated  History Lipid Disorder     Other Preventive Screenings Covered by Medicare:  Abdominal Aortic Aneurysm (AAA) Screening: covered once if your at risk. You're considered to be at risk if you have a family history of AAA. Lung Cancer Screening: covers low dose CT scan once per year if you meet all of the following conditions: (1) Age 48-67; (2) No signs or symptoms of lung cancer; (3) Current smoker or have quit smoking within the last 15 years; (4) You have a tobacco smoking history of at least 20 pack years (packs per day multiplied by number of years you smoked); (5) You get a written order from a healthcare provider. Glaucoma Screening: covered annually if you're considered high risk: (1) You have diabetes OR (2) Family history of glaucoma OR (3)  aged 48 and older OR (3)  American aged 72 and older  Osteoporosis Screening: covered every 2 years if you meet one of the following conditions: (1) You're estrogen deficient and at risk for osteoporosis based off medical history and other findings; (2) Have a vertebral abnormality; (3) On glucocorticoid therapy for more than 3 months; (4) Have primary hyperparathyroidism; (5) On osteoporosis medications and need to assess response to drug therapy. Last bone density test (DXA Scan): Not on file. HIV Screening: covered annually if you're between the age of 14-79. Also covered annually if you are younger than 13 and older than 72 with risk factors for HIV infection. For pregnant patients, it is covered up to 3 times per pregnancy.     Immunizations:  Immunization Recommendations   Influenza Vaccine Annual influenza vaccination during flu season is recommended for all persons aged >= 6 months who do not have contraindications   Pneumococcal Vaccine   * Pneumococcal conjugate vaccine = PCV13 (Prevnar 13), PCV15 (Vaxneuvance), PCV20 (Prevnar 20)  * Pneumococcal polysaccharide vaccine = PPSV23 (Pneumovax) Adults 77-39 yo with certain risk factors or if 69+ yo  If never received any pneumonia vaccine: recommend Prevnar 21 (PCV20)  Give PCV20 if previously received 1 dose of PCV13 or PPSV23   Hepatitis B Vaccine 3 dose series if at intermediate or high risk (ex: diabetes, end stage renal disease, liver disease)   Respiratory syncytial virus (RSV) Vaccine - COVERED BY MEDICARE PART D  * RSVPreF3 (Arexvy) CDC recommends that adults 61years of age and older may receive a single dose of RSV vaccine using shared clinical decision-making (SCDM)   Tetanus (Td) Vaccine - COST NOT COVERED BY MEDICARE PART B Following completion of primary series, a booster dose should be given every 10 years to maintain immunity against tetanus. Td may also be given as tetanus wound prophylaxis. Tdap Vaccine - COST NOT COVERED BY MEDICARE PART B Recommended at least once for all adults. For pregnant patients, recommended with each pregnancy. Shingles Vaccine (Shingrix) - COST NOT COVERED BY MEDICARE PART B  2 shot series recommended in those 19 years and older who have or will have weakened immune systems or those 50 years and older     Health Maintenance Due:      Topic Date Due   • Lung Cancer Screening  Never done   • Colorectal Cancer Screening  01/16/2014   • Breast Cancer Screening: Mammogram  01/24/2021   • Hepatitis C Screening  Completed     Immunizations Due:      Topic Date Due   • COVID-19 Vaccine (2 - Booster for Esther series) 05/31/2021   • Influenza Vaccine (1) 09/01/2023     Advance Directives   What are advance directives? Advance directives are legal documents that state your wishes and plans for medical care. These plans are made ahead of time in case you lose your ability to make decisions for yourself. Advance directives can apply to any medical decision, such as the treatments you want, and if you want to donate organs. What are the types of advance directives?   There are many types of advance directives, and each state has rules about how to use them. You may choose a combination of any of the following:  Living will: This is a written record of the treatment you want. You can also choose which treatments you do not want, which to limit, and which to stop at a certain time. This includes surgery, medicine, IV fluid, and tube feedings. Durable power of  for [facility]): This is a written record that states who you want to make healthcare choices for you when you are unable to make them for yourself. This person, called a proxy, is usually a family member or a friend. You may choose more than 1 proxy. Do not resuscitate (DNR) order:  A DNR order is used in case your heart stops beating or you stop breathing. It is a request not to have certain forms of treatment, such as CPR. A DNR order may be included in other types of advance directives. Medical directive: This covers the care that you want if you are in a coma, near death, or unable to make decisions for yourself. You can list the treatments you want for each condition. Treatment may include pain medicine, surgery, blood transfusions, dialysis, IV or tube feedings, and a ventilator (breathing machine). Values history: This document has questions about your views, beliefs, and how you feel and think about life. This information can help others choose the care that you would choose. Why are advance directives important? An advance directive helps you control your care. Although spoken wishes may be used, it is better to have your wishes written down. Spoken wishes can be misunderstood, or not followed. Treatments may be given even if you do not want them. An advance directive may make it easier for your family to make difficult choices about your care. Urinary Incontinence   Urinary incontinence (UI)  is when you lose control of your bladder. UI develops because your bladder cannot store or empty urine properly.  The 3 most common types of UI are stress incontinence, urge incontinence, or both. Medicines:   May be given to help strengthen your bladder control. Report any side effects of medication to your healthcare provider. Do pelvic muscle exercises often:  Your pelvic muscles help you stop urinating. Squeeze these muscles tight for 5 seconds, then relax for 5 seconds. Gradually work up to squeezing for 10 seconds. Do 3 sets of 15 repetitions a day, or as directed. This will help strengthen your pelvic muscles and improve bladder control. Train your bladder:  Go to the bathroom at set times, such as every 2 hours, even if you do not feel the urge to go. You can also try to hold your urine when you feel the urge to go. For example, hold your urine for 5 minutes when you feel the urge to go. As that becomes easier, hold your urine for 10 minutes. Self-care:   Keep a UI record. Write down how often you leak urine and how much you leak. Make a note of what you were doing when you leaked urine. Drink liquids as directed. You may need to limit the amount of liquid you drink to help control your urine leakage. Do not drink any liquid right before you go to bed. Limit or do not have drinks that contain caffeine or alcohol. Prevent constipation. Eat a variety of high-fiber foods. Good examples are high-fiber cereals, beans, vegetables, and whole-grain breads. Walking is the best way to trigger your intestines to have a bowel movement. Exercise regularly and maintain a healthy weight. Weight loss and exercise will decrease pressure on your bladder and help you control your leakage. Use a catheter as directed  to help empty your bladder. A catheter is a tiny, plastic tube that is put into your bladder to drain your urine. Go to behavior therapy as directed. Behavior therapy may be used to help you learn to control your urge to urinate.     Weight Management   Why it is important to manage your weight:  Being overweight increases your risk of health conditions such as heart disease, high blood pressure, type 2 diabetes, and certain types of cancer. It can also increase your risk for osteoarthritis, sleep apnea, and other respiratory problems. Aim for a slow, steady weight loss. Even a small amount of weight loss can lower your risk of health problems. How to lose weight safely:  A safe and healthy way to lose weight is to eat fewer calories and get regular exercise. You can lose up about 1 pound a week by decreasing the number of calories you eat by 500 calories each day. Healthy meal plan for weight management:  A healthy meal plan includes a variety of foods, contains fewer calories, and helps you stay healthy. A healthy meal plan includes the following:  Eat whole-grain foods more often. A healthy meal plan should contain fiber. Fiber is the part of grains, fruits, and vegetables that is not broken down by your body. Whole-grain foods are healthy and provide extra fiber in your diet. Some examples of whole-grain foods are whole-wheat breads and pastas, oatmeal, brown rice, and bulgur. Eat a variety of vegetables every day. Include dark, leafy greens such as spinach, kale, zoila greens, and mustard greens. Eat yellow and orange vegetables such as carrots, sweet potatoes, and winter squash. Eat a variety of fruits every day. Choose fresh or canned fruit (canned in its own juice or light syrup) instead of juice. Fruit juice has very little or no fiber. Eat low-fat dairy foods. Drink fat-free (skim) milk or 1% milk. Eat fat-free yogurt and low-fat cottage cheese. Try low-fat cheeses such as mozzarella and other reduced-fat cheeses. Choose meat and other protein foods that are low in fat. Choose beans or other legumes such as split peas or lentils. Choose fish, skinless poultry (chicken or turkey), or lean cuts of red meat (beef or pork). Before you cook meat or poultry, cut off any visible fat. Use less fat and oil.   Try baking foods instead of frying them. Add less fat, such as margarine, sour cream, regular salad dressing and mayonnaise to foods. Eat fewer high-fat foods. Some examples of high-fat foods include french fries, doughnuts, ice cream, and cakes. Eat fewer sweets. Limit foods and drinks that are high in sugar. This includes candy, cookies, regular soda, and sweetened drinks. Exercise:  Exercise at least 30 minutes per day on most days of the week. Some examples of exercise include walking, biking, dancing, and swimming. You can also fit in more physical activity by taking the stairs instead of the elevator or parking farther away from stores. Ask your healthcare provider about the best exercise plan for you. © Copyright [employer] 2018 Information is for End User's use only and may not be sold, redistributed or otherwise used for commercial purposes.  All illustrations and images included in CareNotes® are the copyrighted property of [employer] Inc. or [address] [place] [address]

## 2023-12-06 NOTE — PROGRESS NOTES
Assessment and Plan:     Problem List Items Addressed This Visit     Benign essential hypertension - Primary     -fair control  -increase lisinopril to 40mg daily, continue amlodipine 5mg two tabs daily and metoprolol tartrate 25mg q12  -monitor BMP         Relevant Medications    lisinopril (ZESTRIL) 40 mg tablet    Other Relevant Orders    Comprehensive metabolic panel    Essential thrombocythemia (HCC)     -+DEAN mutation  -stable on hydroxyurea 500mg every other day  -follow up with Heme          Hypothyroidism    Relevant Orders    Comprehensive metabolic panel    TSH, 3rd generation with Free T4 reflex    IFG (impaired fasting glucose)     -check A1c  -minimize carbs         Relevant Orders    Hemoglobin A1C    Osteopenia     -pt still declining surveillance DXA  -recommend calcium, vitamin D and weight bearing exercises         Vertigo, benign paroxysmal, left     -asymptomatic         Mixed stress and urge urinary incontinence     -recommend avoidance of bladder irritants, using restroom frequently and kegels         Chronic right-sided low back pain without sciatica     -suspect herniated disc  -obtain LS xray  -agreeable to PT  -continue heat and recommend stretches         Relevant Orders    XR spine lumbar minimum 4 views non injury    Ambulatory Referral to Physical Therapy   Other Visit Diagnoses     Medicare annual wellness visit, subsequent        Encounter for screening mammogram for breast cancer        Relevant Orders    Mammo screening bilateral w 3d & cad    Encounter for immunization        Relevant Orders    influenza vaccine, high-dose, PF 0.7 mL (FLUZONE HIGH-DOSE) (Completed)        BMI Counseling: Body mass index is 32.51 kg/m². The BMI is above normal. Nutrition recommendations include decreasing portion sizes, consuming healthier snacks, limiting drinks that contain sugar, moderation in carbohydrate intake and reducing intake of cholesterol.  Exercise recommendations include moderate physical activity 150 minutes/week, exercising 3-5 times per week and strength training exercises. No pharmacotherapy was ordered. Rationale for BMI follow-up plan is due to patient being overweight or obese. Depression Screening and Follow-up Plan: Patient was screened for depression during today's encounter. They screened negative with a PHQ-2 score of 1. Urinary Incontinence Plan of Care: counseling topics discussed: practice Kegel (pelvic floor strengthening) exercises, use restroom every 2 hours, limit alcohol, caffeine, spicy foods, and acidic foods, limiting fluid intake 3-4 hours before bed and preventing constipation. Preventive health issues were discussed with patient, and age appropriate screening tests were ordered as noted in patient's After Visit Summary. Personalized health advice and appropriate referrals for health education or preventive services given if needed, as noted in patient's After Visit Summary. History of Present Illness:     Patient presents for a Medicare Wellness Visit    HPI     Complains of LBP with radiation to R hip/knee x several weeks. Years ago, had MRI that was noted to have herniated disc. She denies trauma. Applies heating pad. Position of comfort is laying on her side and sitting in her recliner. Prolonged standing exacerbates her pain. She continues to decline surveillance DXA. Home BP around 128-140/80s. She denies need for nitroglycerin. Patient Care Team:  Sheryl Aburto DO as PCP - General  MD Abran Johns (Optometry)  Chadwick Hanson MD (Cardiology)     Review of Systems:     Review of Systems   Constitutional:  Negative for activity change, appetite change and unexpected weight change. HENT:  Negative for congestion, rhinorrhea and sinus pain. Eyes:  Negative for visual disturbance. Respiratory:  Negative for cough, chest tightness, shortness of breath and wheezing.     Cardiovascular:  Negative for chest pain, palpitations and leg swelling. Gastrointestinal:  Negative for abdominal pain, constipation and diarrhea. Genitourinary:  Negative for difficulty urinating. Incontinence   Musculoskeletal:  Positive for arthralgias and back pain. Neurological:  Negative for dizziness, numbness and headaches. Psychiatric/Behavioral:  Negative for dysphoric mood and sleep disturbance. Problem List:     Patient Active Problem List   Diagnosis   • Myeloproliferative disorder [facility])   • Benign essential hypertension   • Essential thrombocythemia ([address])   • Fatty liver   • Hypothyroidism   • IFG (impaired fasting glucose)   • Insomnia   • Osteopenia   • Presence of drug-eluting stent in right coronary artery   • Vertigo, benign paroxysmal, left   • Atherosclerosis of native coronary artery of native heart with angina pectoris ([address])   • Mixed stress and urge urinary incontinence   • Chronic right-sided low back pain without sciatica      Past Medical and Surgical History:     Past Medical History:   Diagnosis Date   • Biliary colic     last assessed: 01/05/16   • Cholecystitis    • Disease of thyroid gland     hypo   • Hematuria     last assessed: 05/05/17   • Hypercalcemia 12/30/2019   • Hypertension    • Hypokalemia 01/22/2016   • Impaired fasting glucose 07/09/2013   • Leukocytosis     last assessed: 04/05/16   • Obesity (BMI 30.0-34.9) 06/21/2019   • Prediabetes 11/10/2016   • STEMI involving right coronary artery ([address]) 01/28/2022   • Toxic nodular goiter    • Uterine fibroid      Past Surgical History:   Procedure Laterality Date   • CARDIAC CATHETERIZATION N/A 1/28/2022    Procedure: Cardiac pci;  Surgeon: Julia Amaya MD;  Location: [facility] CARD[facility]AC [facility];   Service: Cardiology   • HYSTERECTOMY      INGRIS, with removal of both ovaries   • MN LAPAROSCOPY SURG CHOLECYSTECTOMY N/A 3/9/2016    Procedure: Relda Mood ;  Surgeon: Mj Fernandes MD;  Location: [facility] MAIN OR;  Service: General • TUBAL LIGATION        Family History:     Family History   Problem Relation Age of Onset   • Hypothyroidism Mother    • Heart disease Brother         Cardiac disorder      Social History:     Social History     Socioeconomic History   • Marital status:      Spouse name: None   • Number of children: None   • Years of education: None   • Highest education level: None   Occupational History   • Occupation: Retired   Tobacco Use   • Smoking status: Former     Packs/day: 0.50     Years: 40.00     Total pack years: 20.00     Types: Cigarettes     Quit date: 2012     Years since quittin.9   • Smokeless tobacco: Never   Vaping Use   • Vaping Use: Never used   Substance and Sexual Activity   • Alcohol use: No   • Drug use: No   • Sexual activity: Never   Other Topics Concern   • None   Social History Narrative   • None     Social Determinants of Health     Financial Resource Strain: Low Risk  (2023)    Overall Financial Resource Strain (CARDIA)    • Difficulty of Paying Living Expenses: Not hard at all   Food Insecurity: No Food Insecurity (2022)    Hunger Vital Sign    • Worried About Running Out of Food in the Last Year: Never true    • Ran Out of Food in the Last Year: Never true   Transportation Needs: No Transportation Needs (2023)    PRAPARE - Transportation    • Lack of Transportation (Medical): No    • Lack of Transportation (Non-Medical):  No   Physical Activity: Not on file   Stress: Not on file   Social Connections: Not on file   Intimate Partner Violence: Not on file   Housing Stability: Unknown (2022)    Housing Stability Vital Sign    • Unable to Pay for Housing in the Last Year: No    • Number of Places Lived in the Last Year: Not on file    • Unstable Housing in the Last Year: No      Medications and Allergies:     Current Outpatient Medications   Medication Sig Dispense Refill   • amLODIPine (NORVASC) 5 mg tablet Take 2 tablets (10 mg total) by mouth daily 180 tablet 1 • aspirin 81 MG tablet Take 81 mg by mouth daily. • atorvastatin (LIPITOR) 40 mg tablet Take 1 tablet (40 mg total) by mouth daily 90 tablet 1   • clopidogrel (PLAVIX) 75 mg tablet Take 1 tablet by mouth once daily 90 tablet 2   • hydroxyurea (HYDREA) 500 mg capsule TAKE 1 CAPSULE BY MOUTH EVERY OTHER DAY 45 capsule 5   • levothyroxine 112 mcg tablet Take 1 tablet (112 mcg total) by mouth daily 90 tablet 1   • lisinopril (ZESTRIL) 40 mg tablet Take 1 tablet (40 mg total) by mouth daily 90 tablet 1   • metoprolol tartrate (LOPRESSOR) 25 mg tablet TAKE 1 TABLET BY MOUTH EVERY 12 HOURS 90 tablet 0   • nitroglycerin (NITROSTAT) 0.4 mg SL tablet Place 1 tablet (0.4 mg total) under the tongue every 5 (five) minutes as needed for chest pain 30 tablet 3     No current facility-administered medications for this visit. No Known Allergies   Immunizations:     Immunization History   Administered Date(s) Administered   • COVID-19 J&J (Esther) vaccine 0.5 mL 04/05/2021   • INFLUENZA 11/08/2022   • Influenza Split High Dose Preservative Free IM 12/11/2013, 11/03/2014, 09/09/2015, 11/10/2016, 10/06/2017   • Influenza, high dose seasonal 0.7 mL 10/17/2018, 12/30/2019, 10/09/2020, 11/03/2021, 11/08/2022, 12/06/2023   • Influenza, seasonal, injectable 11/07/2012   • Pneumococcal Conjugate 13-Valent 11/03/2014   • Pneumococcal Polysaccharide PPV23 11/07/2012      Health Maintenance:         Topic Date Due   • Breast Cancer Screening: Mammogram  01/24/2021   • Lung Cancer Screening  12/06/2024 (Originally 5/16/1997)   • Colorectal Cancer Screening  12/06/2024 (Originally 5/16/1992)   • Hepatitis C Screening  Completed     There are no preventive care reminders to display for this patient. Medicare Screening Tests and Risk Assessments:     Addison Aguila is here for her Subsequent Wellness visit. Last Medicare Wellness visit information reviewed, patient interviewed and updates made to the record today.       Health Risk Assessment:   Patient rates overall health as good. Patient feels that their physical health rating is same. Patient is satisfied with their life. Eyesight was rated as same. Hearing was rated as same. Patient feels that their emotional and mental health rating is much better. Patients states they are never, rarely angry. Patient states they are sometimes unusually tired/fatigued. Pain experienced in the last 7 days has been a lot. Patient's pain rating has been 8/10. Patient states that she has experienced no weight loss or gain in last 6 months. Low back pain with radiation to her R hip and knee    Depression Screening:   PHQ-2 Score: 1      Fall Risk Screening: In the past year, patient has experienced: no history of falling in past year      Urinary Incontinence Screening:   Patient has leaked urine accidently in the last six months. Home Safety:  Patient does not have trouble with stairs inside or outside of their home. Patient has working smoke alarms and has working carbon monoxide detector. Home safety hazards include: none. Nutrition:   Current diet is Regular and No Added Salt. Medications:   Patient is not currently taking any over-the-counter supplements. Patient is able to manage medications. Activities of Daily Living (ADLs)/Instrumental Activities of Daily Living (IADLs):   Walk and transfer into and out of bed and chair?: Yes  Dress and groom yourself?: Yes    Bathe or shower yourself?: Yes    Feed yourself?  Yes  Do your laundry/housekeeping?: Yes  Manage your money, pay your bills and track your expenses?: Yes  Make your own meals?: Yes    Do your own shopping?: Yes    Durable Medical Equipment Suppliers  pt does not know    Previous Hospitalizations:   Any hospitalizations or ED visits within the last 12 months?: No      Advance Care Planning:   Living will: No    Durable POA for healthcare: No    Advanced directive: No    ACP document given: Yes      Cognitive Screening: Provider or family/friend/caregiver concerned regarding cognition?: No    PREVENTIVE SCREENINGS      Cardiovascular Screening:    General: Screening Not Indicated and History Lipid Disorder      Diabetes Screening:     General: Screening Current      Colorectal Cancer Screening:     General: Screening Current      Breast Cancer Screening:     General: Risks and Benefits Discussed    Due for: Mammogram        Cervical Cancer Screening:    General: Screening Not Indicated      Osteoporosis Screening:    General: Screening Current      Abdominal Aortic Aneurysm (AAA) Screening:        General: Screening Not Indicated      Lung Cancer Screening:     General: Risks and Benefits Discussed and Screening Not Indicated      Hepatitis C Screening:    General: Screening Current    Screening, Brief Intervention, and Referral to Treatment (SBIRT)    Screening  Typical number of drinks in a day: 0  Typical number of drinks in a week: 0  Interpretation: Low risk drinking behavior. Single Item Drug Screening:  How often have you used an illegal drug (including marijuana) or a prescription medication for non-medical reasons in the past year? never    Single Item Drug Screen Score: 0  Interpretation: Negative screen for possible drug use disorder    Brief Intervention  Alcohol & drug use screenings were reviewed. No concerns regarding substance use disorder identified. Time Spent  Time spent screening/evaluating the patient for alcohol misuse: 1 minutes. Other Counseling Topics:   Car/seat belt/driving safety, skin self-exam, sunscreen and regular weightbearing exercise and calcium and vitamin D intake. Immunizations discussed. High dose influenza vaccine due today. Consider updated COVID vaccine, RSV vaccine, tdap and shingrix. No results found.      Physical Exam:     /70   Pulse 79   Temp 98 °F (36.7 °C)   Resp 16   Ht 5' 2.5" (1.588 m)   Wt 81.9 kg (180 lb 9.6 oz)   SpO2 97%   BMI 32.51 kg/m² Physical Exam  Constitutional:       General: She is not in acute distress. Appearance: She is obese. HENT:      Head: Normocephalic. Right Ear: Tympanic membrane and ear canal normal.      Left Ear: Tympanic membrane and ear canal normal.      Nose: Nose normal.      Mouth/Throat:      Mouth: Mucous membranes are moist.   Eyes:      Extraocular Movements: Extraocular movements intact. Conjunctiva/sclera: Conjunctivae normal.      Pupils: Pupils are equal, round, and reactive to light. Neck:      Thyroid: No thyromegaly or thyroid tenderness. Vascular: No carotid bruit. Cardiovascular:      Rate and Rhythm: Normal rate and regular rhythm. Pulses: Normal pulses. Heart sounds: Normal heart sounds. Pulmonary:      Effort: Pulmonary effort is normal.      Breath sounds: Normal breath sounds. Abdominal:      General: Bowel sounds are normal. There is no distension. Palpations: Abdomen is soft. Musculoskeletal:      Cervical back: Neck supple. No tenderness. Lumbar back: No bony tenderness. Negative right straight leg raise test and negative left straight leg raise test.      Right lower leg: No edema. Left lower leg: No edema. Lymphadenopathy:      Cervical: No cervical adenopathy. Skin:     General: Skin is dry. Coloration: Skin is not pale. Neurological:      General: No focal deficit present. Mental Status: She is alert and oriented to person, place, and time.    Psychiatric:         Mood and Affect: Mood normal.        Recent Results (from the past 168 hour(s))   Comprehensive metabolic panel    Collection Time: 12/04/23 10:28 AM   Result Value Ref Range    Sodium 138 135 - 147 mmol/L    Potassium 4.2 3.5 - 5.3 mmol/L    Chloride 104 96 - 108 mmol/L    CO2 27 21 - 32 mmol/L    ANION GAP 7 mmol/L    BUN 11 5 - 25 mg/dL    Creatinine 0.71 0.60 - 1.30 mg/dL    Glucose, Fasting 101 (H) 65 - 99 mg/dL    Calcium 9.8 8.4 - 10.2 mg/dL    AST 15 13 - 39 U/L    ALT 12 7 - 52 U/L    Alkaline Phosphatase 86 34 - 104 U/L    Total Protein 6.9 6.4 - 8.4 g/dL    Albumin 4.1 3.5 - 5.0 g/dL    Total Bilirubin 0.36 0.20 - 1.00 mg/dL    eGFR 82 ml/min/1.73sq robin BLACKMAN [employer], DO

## 2024-01-01 DIAGNOSIS — I21.3 STEMI (ST ELEVATION MYOCARDIAL INFARCTION) (HCC): ICD-10-CM

## 2024-01-06 DIAGNOSIS — I21.11 STEMI INVOLVING RIGHT CORONARY ARTERY (HCC): ICD-10-CM

## 2024-01-06 DIAGNOSIS — I21.3 STEMI (ST ELEVATION MYOCARDIAL INFARCTION) (HCC): ICD-10-CM

## 2024-01-08 RX ORDER — CLOPIDOGREL BISULFATE 75 MG/1
TABLET ORAL
Qty: 90 TABLET | Refills: 3 | Status: SHIPPED | OUTPATIENT
Start: 2024-01-08

## 2024-01-08 RX ORDER — ATORVASTATIN CALCIUM 40 MG/1
40 TABLET, FILM COATED ORAL DAILY
Qty: 90 TABLET | Refills: 3 | Status: SHIPPED | OUTPATIENT
Start: 2024-01-08

## 2024-01-16 DIAGNOSIS — I10 BENIGN ESSENTIAL HYPERTENSION: ICD-10-CM

## 2024-01-16 RX ORDER — LISINOPRIL 40 MG/1
40 TABLET ORAL DAILY
Qty: 90 TABLET | Refills: 1 | Status: SHIPPED | OUTPATIENT
Start: 2024-01-16

## 2024-02-09 DIAGNOSIS — I10 BENIGN HYPERTENSION: ICD-10-CM

## 2024-02-09 DIAGNOSIS — I10 BENIGN ESSENTIAL HYPERTENSION: ICD-10-CM

## 2024-02-09 RX ORDER — LISINOPRIL 20 MG/1
20 TABLET ORAL DAILY
Qty: 90 TABLET | Refills: 0 | OUTPATIENT
Start: 2024-02-09

## 2024-02-09 RX ORDER — AMLODIPINE BESYLATE 5 MG/1
10 TABLET ORAL DAILY
Qty: 180 TABLET | Refills: 1 | Status: SHIPPED | OUTPATIENT
Start: 2024-02-09

## 2024-02-09 NOTE — TELEPHONE ENCOUNTER
Requested medication(s) are due for refill today: Yes  Patient has already received a courtesy refill: No  Other reason request has been forwarded to provider:

## 2024-02-12 DIAGNOSIS — I21.3 STEMI (ST ELEVATION MYOCARDIAL INFARCTION) (HCC): ICD-10-CM

## 2024-02-15 DIAGNOSIS — E03.9 ACQUIRED HYPOTHYROIDISM: ICD-10-CM

## 2024-02-15 RX ORDER — LEVOTHYROXINE SODIUM 112 UG/1
112 TABLET ORAL DAILY
Qty: 90 TABLET | Refills: 0 | Status: SHIPPED | OUTPATIENT
Start: 2024-02-15

## 2024-03-05 DIAGNOSIS — I21.3 STEMI (ST ELEVATION MYOCARDIAL INFARCTION) (HCC): ICD-10-CM

## 2024-04-20 NOTE — PROGRESS NOTES
Cardiology Follow Up    Cierra Santiago  1945  [id number]  [facility] [place]  [address]  [place] [id number] [phone]    1. STEMI involving right coronary artery (HCC)        2. Essential (primary) hypertension        3. Hyperlipidemia, unspecified hyperlipidemia type            Interval History: Patient is here for a f/u visit. She was hospitalized 1/2022 with an RCA STEMI.  Cardiac catheterization 1/28/2022 demonstrated a closed RCA with successful PCI and placement of a HANH.  Other vessels had mild diffuse disease.  Echocardiogram 9/2022 demonstrated LVEF of 60% with mild inferobasal hypokinesis.  AV sclerosis was noted.  Compared to prior echo 1/2022,  LV function appears improved.  Lipid profile 5/2023 demonstrated total cholesterol of 131 with an HDL of 47 and a calculated LDL of 65.  Patient is on atorvastatin.  Patient has essential thrombocythemia and is on hydroxyurea and followed by Heme/Onc.  Patient has had no chest pain or significant dyspnea.  Vital signs are stable today.  Patient has no chest pain or significant dyspnea.  Her vital signs are stable today.  The HTN and HLD are stable on her current medicine.    Patient Active Problem List   Diagnosis   • Myeloproliferative disorder (HCC)   • Benign essential hypertension   • Essential thrombocythemia (HCC)   • Fatty liver   • Hypothyroidism   • IFG (impaired fasting glucose)   • Insomnia   • Osteopenia   • Presence of drug-eluting stent in right coronary artery   • Vertigo, benign paroxysmal, left   • Atherosclerosis of native coronary artery of native heart with angina pectoris (HCC)   • Mixed stress and urge urinary incontinence   • Chronic right-sided low back pain without sciatica     Past Medical History:   Diagnosis Date   • Biliary colic     last assessed: 01/05/16   • Cholecystitis    • Disease of thyroid gland     hypo   • Hematuria     last  assessed: 17   • Hypercalcemia 2019   • Hypertension    • Hypokalemia 2016   • Impaired fasting glucose 2013   • Leukocytosis     last assessed: 16   • Obesity (BMI 30.0-34.9) 2019   • Prediabetes 11/10/2016   • STEMI involving right coronary artery (HCC) 2022   • Toxic nodular goiter    • Uterine fibroid      Social History     Socioeconomic History   • Marital status:      Spouse name: Not on file   • Number of children: Not on file   • Years of education: Not on file   • Highest education level: Not on file   Occupational History   • Occupation: Retired   Tobacco Use   • Smoking status: Former     Current packs/day: 0.00     Average packs/day: 0.5 packs/day for 40.0 years (20.0 ttl pk-yrs)     Types: Cigarettes     Start date: 1972     Quit date: 2012     Years since quittin.3   • Smokeless tobacco: Never   Vaping Use   • Vaping status: Never Used   Substance and Sexual Activity   • Alcohol use: No   • Drug use: No   • Sexual activity: Never   Other Topics Concern   • Not on file   Social History Narrative   • Not on file     Social Determinants of Health     Financial Resource Strain: Low Risk  (2023)    Overall Financial Resource Strain (CARDIA)    • Difficulty of Paying Living Expenses: Not hard at all   Food Insecurity: No Food Insecurity (2022)    Hunger Vital Sign    • Worried About Running Out of Food in the Last Year: Never true    • Ran Out of Food in the Last Year: Never true   Transportation Needs: No Transportation Needs (2023)    PRAPARE - Transportation    • Lack of Transportation (Medical): No    • Lack of Transportation (Non-Medical): No   Physical Activity: Not on file   Stress: Not on file   Social Connections: Not on file   Intimate Partner Violence: Not on file   Housing Stability: Unknown (2022)    Housing Stability Vital Sign    • Unable to Pay for Housing in the Last Year: No    • Number of Places Lived in the  "Last Year: Not on file    • Unstable Housing in the Last Year: No      Family History   Problem Relation Age of Onset   • Hypothyroidism Mother    • Heart disease Brother         Cardiac disorder     Past Surgical History:   Procedure Laterality Date   • CARDIAC CATHETERIZATION N/A 1/28/2022    Procedure: Cardiac pci;  Surgeon: Noam Anaya MD;  Location: [facility];  Service: Cardiology   • HYSTERECTOMY      INGRIS, with removal of both ovaries   • IL LAPAROSCOPY SURG CHOLECYSTECTOMY N/A 3/9/2016    Procedure: LAPAROSCOPIC CHOLECYSTECTOMY ;  Surgeon: Blake Gonzalez MD;  Location: [facility];  Service: General   • TUBAL LIGATION         Current Outpatient Medications:   •  amLODIPine (NORVASC) 5 mg tablet, Take 2 tablets by mouth once daily, Disp: 180 tablet, Rfl: 1  •  aspirin 81 MG tablet, Take 81 mg by mouth daily., Disp: , Rfl:   •  atorvastatin (LIPITOR) 40 mg tablet, Take 1 tablet by mouth once daily, Disp: 90 tablet, Rfl: 3  •  clopidogrel (PLAVIX) 75 mg tablet, Take 1 tablet by mouth once daily, Disp: 90 tablet, Rfl: 3  •  hydroxyurea (HYDREA) 500 mg capsule, TAKE 1 CAPSULE BY MOUTH EVERY OTHER DAY, Disp: 45 capsule, Rfl: 5  •  levothyroxine 112 mcg tablet, Take 1 tablet by mouth once daily, Disp: 90 tablet, Rfl: 0  •  lisinopril (ZESTRIL) 40 mg tablet, Take 1 tablet (40 mg total) by mouth daily, Disp: 90 tablet, Rfl: 1  •  metoprolol tartrate (LOPRESSOR) 25 mg tablet, Take 1 tablet (25 mg total) by mouth every 12 (twelve) hours, Disp: 180 tablet, Rfl: 3  •  nitroglycerin (NITROSTAT) 0.4 mg SL tablet, Place 1 tablet (0.4 mg total) under the tongue every 5 (five) minutes as needed for chest pain, Disp: 30 tablet, Rfl: 3  No Known Allergies    Labs:not applicable  Imaging: No results found.    Review of Systems:  Review of Systems   All other systems reviewed and are negative.      Physical Exam:  /74 (BP Location: Left arm, Patient Position: Sitting, Cuff Size: Standard)   Pulse 72   Ht 5' 2.5\" " (1.588 m)   Wt 84.4 kg (186 lb)   SpO2 98%   BMI 33.48 kg/m²   Physical Exam  Vitals reviewed.   Constitutional:       Appearance: She is well-developed.   HENT:      Head: Normocephalic and atraumatic.   Eyes:      Conjunctiva/sclera: Conjunctivae normal.      Pupils: Pupils are equal, round, and reactive to light.   Cardiovascular:      Rate and Rhythm: Normal rate.      Heart sounds: Normal heart sounds.   Pulmonary:      Effort: Pulmonary effort is normal.      Breath sounds: Normal breath sounds.   Musculoskeletal:      Cervical back: Normal range of motion and neck supple.   Skin:     General: Skin is warm and dry.   Neurological:      Mental Status: She is alert and oriented to person, place, and time.         Discussion/Summary:I will continue the patient's present medical regimen.  The patient appears well compensated.  I have asked the patient to call if there is a problem in the interim otherwise I will see the patient in six months time.

## 2024-04-30 ENCOUNTER — OFFICE VISIT (OUTPATIENT)
Dept: CARDIOLOGY CLINIC | Facility: CLINIC | Age: 79
End: 2024-04-30
Payer: COMMERCIAL

## 2024-04-30 VITALS
HEART RATE: 72 BPM | OXYGEN SATURATION: 98 % | BODY MASS INDEX: 32.96 KG/M2 | WEIGHT: 186 LBS | SYSTOLIC BLOOD PRESSURE: 128 MMHG | HEIGHT: 63 IN | DIASTOLIC BLOOD PRESSURE: 74 MMHG

## 2024-04-30 DIAGNOSIS — I21.11 STEMI INVOLVING RIGHT CORONARY ARTERY (HCC): Primary | ICD-10-CM

## 2024-04-30 DIAGNOSIS — I10 ESSENTIAL (PRIMARY) HYPERTENSION: ICD-10-CM

## 2024-04-30 DIAGNOSIS — E78.5 HYPERLIPIDEMIA, UNSPECIFIED HYPERLIPIDEMIA TYPE: ICD-10-CM

## 2024-04-30 PROCEDURE — 99214 OFFICE O/P EST MOD 30 MIN: CPT | Performed by: INTERNAL MEDICINE

## 2024-04-30 PROCEDURE — 3074F SYST BP LT 130 MM HG: CPT | Performed by: INTERNAL MEDICINE

## 2024-04-30 PROCEDURE — 3078F DIAST BP <80 MM HG: CPT | Performed by: INTERNAL MEDICINE

## 2024-04-30 PROCEDURE — 1159F MED LIST DOCD IN RCRD: CPT | Performed by: INTERNAL MEDICINE

## 2024-04-30 PROCEDURE — 1160F RVW MEDS BY RX/DR IN RCRD: CPT | Performed by: INTERNAL MEDICINE

## 2024-05-10 DIAGNOSIS — E03.9 ACQUIRED HYPOTHYROIDISM: ICD-10-CM

## 2024-05-10 RX ORDER — LEVOTHYROXINE SODIUM 112 UG/1
112 TABLET ORAL DAILY
Qty: 30 TABLET | Refills: 0 | Status: SHIPPED | OUTPATIENT
Start: 2024-05-10

## 2024-05-10 NOTE — TELEPHONE ENCOUNTER
Blood work in the computer and patient has an appointment    With Internal Medicine (Karla Thomas DO)  06/11/2024 at 8:40 AM

## 2024-05-10 NOTE — TELEPHONE ENCOUNTER
Patient needs updated blood work and has previously placed orders. Please contact patient to go for labs. Courtesy refill provided.  TSH

## 2024-06-06 DIAGNOSIS — E03.9 ACQUIRED HYPOTHYROIDISM: ICD-10-CM

## 2024-06-06 RX ORDER — LEVOTHYROXINE SODIUM 112 UG/1
112 TABLET ORAL DAILY
Qty: 30 TABLET | Refills: 0 | Status: SHIPPED | OUTPATIENT
Start: 2024-06-06

## 2024-06-11 ENCOUNTER — OFFICE VISIT (OUTPATIENT)
Dept: INTERNAL MEDICINE CLINIC | Facility: CLINIC | Age: 79
End: 2024-06-11
Payer: COMMERCIAL

## 2024-06-11 VITALS
SYSTOLIC BLOOD PRESSURE: 138 MMHG | BODY MASS INDEX: 33.86 KG/M2 | WEIGHT: 184 LBS | TEMPERATURE: 98.2 F | RESPIRATION RATE: 18 BRPM | HEIGHT: 62 IN | DIASTOLIC BLOOD PRESSURE: 80 MMHG | OXYGEN SATURATION: 98 % | HEART RATE: 67 BPM

## 2024-06-11 DIAGNOSIS — D47.3 ESSENTIAL THROMBOCYTHEMIA (HCC): ICD-10-CM

## 2024-06-11 DIAGNOSIS — I10 BENIGN ESSENTIAL HYPERTENSION: Primary | ICD-10-CM

## 2024-06-11 DIAGNOSIS — G47.00 INSOMNIA, UNSPECIFIED TYPE: ICD-10-CM

## 2024-06-11 DIAGNOSIS — I25.119 ATHEROSCLEROSIS OF NATIVE CORONARY ARTERY OF NATIVE HEART WITH ANGINA PECTORIS (HCC): ICD-10-CM

## 2024-06-11 DIAGNOSIS — R29.818 SUSPECTED SLEEP APNEA: ICD-10-CM

## 2024-06-11 PROCEDURE — G2211 COMPLEX E/M VISIT ADD ON: HCPCS | Performed by: INTERNAL MEDICINE

## 2024-06-11 PROCEDURE — 99214 OFFICE O/P EST MOD 30 MIN: CPT | Performed by: INTERNAL MEDICINE

## 2024-06-11 NOTE — PROGRESS NOTES
Assessment/Plan:    Problem List Items Addressed This Visit     Benign essential hypertension - Primary     -home bp is well controlled, higher in office  -continue lisinopril 40mg daily, amlodipine 5mg two tabs daily and metoprolol tartrate 25mg q12         Essential thrombocythemia (HCC)     -+DEAN mutation  -tolerating hydroxyurea 500mg every other day and ASA  -followed by Mihir         Insomnia     -has difficulty falling and maintaining sleep  -previously used melatonin  -she is not interested in taking pharmacotherapy at this time         Atherosclerosis of native coronary artery of native heart with angina pectoris (HCC)     -s/p HANH  -she is asymptomatic  -continue aspirin, plavix, lisinopril, atorvastatin and metoprolol  -follows with Cardiology         Relevant Orders    Lipid panel   Other Visit Diagnoses     Suspected sleep apnea        -c/o daytime sleepiness, fatigue, nocturia, HTN  -declines HST testing          Subjective:      Patient ID: Cierra Santiago is a 79 y.o. female.    HPI    She did not go for her labs, had her appt dates mixed up.    Home /74.  She denies HA, dizziness, CPA, palpitations, SOB.  However, she feels tired all the time.  She gets 7-8 hours overnight but has nocturia.  She tosses and turns at night.    Continues with hot flashes but not as severe as before.      The following portions of the patient's history were reviewed and updated as appropriate: allergies, current medications, past family history, past medical history, past social history, past surgical history and problem list.      Current Outpatient Medications:   •  amLODIPine (NORVASC) 5 mg tablet, Take 2 tablets by mouth once daily, Disp: 180 tablet, Rfl: 1  •  aspirin 81 MG tablet, Take 81 mg by mouth daily., Disp: , Rfl:   •  atorvastatin (LIPITOR) 40 mg tablet, Take 1 tablet by mouth once daily, Disp: 90 tablet, Rfl: 3  •  clopidogrel (PLAVIX) 75 mg tablet, Take 1 tablet by mouth once daily, Disp: 90 tablet,  "Rfl: 3  •  hydroxyurea (HYDREA) 500 mg capsule, TAKE 1 CAPSULE BY MOUTH EVERY OTHER DAY, Disp: 45 capsule, Rfl: 5  •  levothyroxine 112 mcg tablet, Take 1 tablet by mouth once daily, Disp: 30 tablet, Rfl: 0  •  lisinopril (ZESTRIL) 40 mg tablet, Take 1 tablet (40 mg total) by mouth daily, Disp: 90 tablet, Rfl: 1  •  metoprolol tartrate (LOPRESSOR) 25 mg tablet, Take 1 tablet (25 mg total) by mouth every 12 (twelve) hours, Disp: 180 tablet, Rfl: 3  •  nitroglycerin (NITROSTAT) 0.4 mg SL tablet, Place 1 tablet (0.4 mg total) under the tongue every 5 (five) minutes as needed for chest pain, Disp: 30 tablet, Rfl: 3    Review of Systems   Constitutional:  Positive for fatigue. Negative for activity change and appetite change.   Respiratory:  Negative for cough, shortness of breath and wheezing.    Cardiovascular:  Negative for chest pain, palpitations and leg swelling.   Gastrointestinal:  Negative for abdominal pain, diarrhea, nausea and vomiting.   Genitourinary:         Nocturia   Neurological:  Negative for dizziness and headaches.   Psychiatric/Behavioral:  Positive for sleep disturbance. The patient is nervous/anxious.          Objective:    /80 (BP Location: Left arm, Patient Position: Sitting, Cuff Size: Large)   Pulse 67   Temp 98.2 °F (36.8 °C) (Tympanic Core)   Resp 18   Ht 5' 2\" (1.575 m)   Wt 83.5 kg (184 lb)   SpO2 98%   BMI 33.65 kg/m²      Physical Exam  Vitals reviewed.   Constitutional:       Appearance: She is obese.      Comments: Smells of smoke   HENT:      Head: Normocephalic.   Cardiovascular:      Rate and Rhythm: Normal rate and regular rhythm.      Pulses: Normal pulses.      Heart sounds: Normal heart sounds.   Pulmonary:      Effort: Pulmonary effort is normal. No respiratory distress.      Breath sounds: Normal breath sounds. No wheezing.   Musculoskeletal:      Right lower leg: No edema.      Left lower leg: No edema.   Skin:     General: Skin is dry.      Coloration: Skin is " not pale.   Neurological:      Mental Status: She is alert and oriented to person, place, and time.   Psychiatric:         Mood and Affect: Mood normal.

## 2024-06-11 NOTE — ASSESSMENT & PLAN NOTE
-home bp is well controlled, higher in office  -continue lisinopril 40mg daily, amlodipine 5mg two tabs daily and metoprolol tartrate 25mg q12

## 2024-06-11 NOTE — ASSESSMENT & PLAN NOTE
-s/p HANH  -she is asymptomatic  -continue aspirin, plavix, lisinopril, atorvastatin and metoprolol  -follows with Cardiology

## 2024-06-11 NOTE — ASSESSMENT & PLAN NOTE
-has difficulty falling and maintaining sleep  -previously used melatonin  -she is not interested in taking pharmacotherapy at this time

## 2024-07-03 DIAGNOSIS — E03.9 ACQUIRED HYPOTHYROIDISM: ICD-10-CM

## 2024-07-03 RX ORDER — LEVOTHYROXINE SODIUM 112 UG/1
112 TABLET ORAL DAILY
Qty: 30 TABLET | Refills: 0 | Status: SHIPPED | OUTPATIENT
Start: 2024-07-03

## 2024-07-12 DIAGNOSIS — I10 BENIGN ESSENTIAL HYPERTENSION: ICD-10-CM

## 2024-07-12 RX ORDER — LISINOPRIL 40 MG/1
40 TABLET ORAL DAILY
Qty: 100 TABLET | Refills: 1 | Status: SHIPPED | OUTPATIENT
Start: 2024-07-12

## 2024-07-12 NOTE — TELEPHONE ENCOUNTER
Reason for call:   [x] Refill   [] Prior Auth  [] Other:     Office:   [x] PCP/Provider -   [] Specialty/Provider -     Medication: Lisinopril 40 mg 1 tablet daily       Pharmacy: [facility] [address]     Does the patient have enough for 3 days?   [x] Yes   [] No - Send as HP to POD

## 2024-07-24 ENCOUNTER — TELEPHONE (OUTPATIENT)
Dept: HEMATOLOGY ONCOLOGY | Facility: CLINIC | Age: 79
End: 2024-07-24

## 2024-07-24 NOTE — TELEPHONE ENCOUNTER
Patient called [facility] stating she is unable to get labs done today, patient rescheduled follow up to August 14. Patient is requesting new labs to be placed to get labs completed.       Thanks!

## 2024-07-30 DIAGNOSIS — E03.9 ACQUIRED HYPOTHYROIDISM: ICD-10-CM

## 2024-07-30 DIAGNOSIS — I10 BENIGN ESSENTIAL HYPERTENSION: ICD-10-CM

## 2024-07-30 DIAGNOSIS — I10 BENIGN HYPERTENSION: ICD-10-CM

## 2024-07-30 RX ORDER — LEVOTHYROXINE SODIUM 112 UG/1
112 TABLET ORAL DAILY
Qty: 30 TABLET | Refills: 0 | OUTPATIENT
Start: 2024-07-30

## 2024-07-30 RX ORDER — AMLODIPINE BESYLATE 5 MG/1
10 TABLET ORAL DAILY
Qty: 180 TABLET | Refills: 1 | Status: SHIPPED | OUTPATIENT
Start: 2024-07-30

## 2024-08-12 ENCOUNTER — TELEPHONE (OUTPATIENT)
Age: 79
End: 2024-08-12

## 2024-08-12 ENCOUNTER — APPOINTMENT (OUTPATIENT)
Dept: LAB | Age: 79
End: 2024-08-12
Payer: COMMERCIAL

## 2024-08-12 DIAGNOSIS — D47.3 ESSENTIAL THROMBOCYTHEMIA (HCC): ICD-10-CM

## 2024-08-12 DIAGNOSIS — D47.3 ESSENTIAL THROMBOCYTHEMIA (HCC): Primary | ICD-10-CM

## 2024-08-12 LAB
BASOPHILS # BLD AUTO: 0.06 THOUSANDS/ÂΜL (ref 0–0.1)
BASOPHILS NFR BLD AUTO: 1 % (ref 0–1)
EOSINOPHIL # BLD AUTO: 0.15 THOUSAND/ÂΜL (ref 0–0.61)
EOSINOPHIL NFR BLD AUTO: 2 % (ref 0–6)
ERYTHROCYTE [DISTWIDTH] IN BLOOD BY AUTOMATED COUNT: 13.9 % (ref 11.6–15.1)
HCT VFR BLD AUTO: 45.5 % (ref 34.8–46.1)
HGB BLD-MCNC: 14.9 G/DL (ref 11.5–15.4)
IMM GRANULOCYTES # BLD AUTO: 0.03 THOUSAND/UL (ref 0–0.2)
IMM GRANULOCYTES NFR BLD AUTO: 0 % (ref 0–2)
LYMPHOCYTES # BLD AUTO: 1.5 THOUSANDS/ÂΜL (ref 0.6–4.47)
LYMPHOCYTES NFR BLD AUTO: 18 % (ref 14–44)
MCH RBC QN AUTO: 33.2 PG (ref 26.8–34.3)
MCHC RBC AUTO-ENTMCNC: 32.7 G/DL (ref 31.4–37.4)
MCV RBC AUTO: 101 FL (ref 82–98)
MONOCYTES # BLD AUTO: 0.66 THOUSAND/ÂΜL (ref 0.17–1.22)
MONOCYTES NFR BLD AUTO: 8 % (ref 4–12)
NEUTROPHILS # BLD AUTO: 5.87 THOUSANDS/ÂΜL (ref 1.85–7.62)
NEUTS SEG NFR BLD AUTO: 71 % (ref 43–75)
NRBC BLD AUTO-RTO: 0 /100 WBCS
PLATELET # BLD AUTO: 523 THOUSANDS/UL (ref 149–390)
PMV BLD AUTO: 9.4 FL (ref 8.9–12.7)
RBC # BLD AUTO: 4.49 MILLION/UL (ref 3.81–5.12)
WBC # BLD AUTO: 8.27 THOUSAND/UL (ref 4.31–10.16)

## 2024-08-12 PROCEDURE — 85025 COMPLETE CBC W/AUTO DIFF WBC: CPT

## 2024-08-12 PROCEDURE — 36415 COLL VENOUS BLD VENIPUNCTURE: CPT

## 2024-08-12 NOTE — TELEPHONE ENCOUNTER
Pt stated Oncology Provider: Dr. Mckeon      What is the reason for the call/chief complaint? [facility] of patient reporting a new cbcd needs to be entered for patient to complete lab work. Upon looking for cbcd order, it is stated in Dr. Mckeon's note for patient to f/u in one year with cbcd. Order entered.

## 2024-08-14 ENCOUNTER — OFFICE VISIT (OUTPATIENT)
Dept: HEMATOLOGY ONCOLOGY | Facility: CLINIC | Age: 79
End: 2024-08-14
Payer: COMMERCIAL

## 2024-08-14 VITALS
HEART RATE: 70 BPM | RESPIRATION RATE: 17 BRPM | OXYGEN SATURATION: 97 % | BODY MASS INDEX: 33.31 KG/M2 | WEIGHT: 181 LBS | HEIGHT: 62 IN | SYSTOLIC BLOOD PRESSURE: 134 MMHG | TEMPERATURE: 97.5 F | DIASTOLIC BLOOD PRESSURE: 78 MMHG

## 2024-08-14 DIAGNOSIS — D47.3 ESSENTIAL THROMBOCYTHEMIA (HCC): Primary | ICD-10-CM

## 2024-08-14 PROCEDURE — G2211 COMPLEX E/M VISIT ADD ON: HCPCS | Performed by: PHYSICIAN ASSISTANT

## 2024-08-14 PROCEDURE — 99213 OFFICE O/P EST LOW 20 MIN: CPT | Performed by: PHYSICIAN ASSISTANT

## 2024-08-14 NOTE — PROGRESS NOTES
Hematology/Oncology Outpatient Follow- up Note  Cierra Santiago 79 y.o. adult MRN: @ Encounter: [id number]        Date:  8/14/2024      Assessment / Plan:    Essential thrombocythemia positive for JAK2 mutation.  She is on aspirin 81 mg daily, she is on hydroxyurea 500 mg p.o. every other day since 2/2020 with good control of her platelets count     Coronary artery disease status post angioplasty in January 2022     Follow-up in 6 months with CBCD    HPI:    79-year-old  female who was seen initially 6/2017 regarding persistent thrombocytosis going back to at least March 2015.   May 2017 WBC 9.3, hemoglobin 15.1, MCV 91, platelets 678,000, 69% neutrophils, 19% lymphocytes, 9% monocytes, 2% eosinophils. November 2016 WBC 9.6, hemoglobin 15.1, platelets 595,000.  June 2016 platelets 594,000.  March 2016 platelets 689,000.  March 2015 platelets 560,000     She has a history of high blood pressure, hypothyroidism,  cholecystectomy in March 2016.  She is a smoker - used to smoke half pack of cigarettes every day for 40 years quit in 2014.  She does not drink alcohol.  History negative for blood disorders of cancer.    FADIA, rheumatoid factor normal.  Ultrasound of the abdomen did not identify hepatosplenomegaly.  DEAN 2 mutation present.      Hydrea 500mg po every other day initiated 2/2020     Aspirin 81 mg p.o. every other day    Interval History:      8/12/24 hemoglobin 14.9, , white blood cell count 8.27, normal differential, platelets 523  Taking her aspirin and Hydrea.    Can forward to her son surprised 30th wedding anniversary party this weekend and her grandsons wedding later this month        Review of Systems   Constitutional:  Negative for appetite change, chills, diaphoresis, fatigue and unexpected weight change.   HENT:   Negative for mouth sores, nosebleeds, sore throat, trouble swallowing and voice change.    Eyes:  Negative for eye problems and icterus.   Respiratory:  Negative for chest  "tightness, cough, hemoptysis and wheezing.    Cardiovascular:  Negative for chest pain, leg swelling and palpitations.   Gastrointestinal:  Negative for abdominal distention, abdominal pain, blood in stool, constipation, diarrhea, nausea and vomiting.   Endocrine: Negative for hot flashes.   Genitourinary:  Negative for bladder incontinence, difficulty urinating, dyspareunia, dysuria and frequency.    Musculoskeletal:  Positive for arthralgias. Negative for back pain, gait problem, neck pain and neck stiffness.   Skin:  Negative for itching and rash.   Neurological:  Negative for dizziness, gait problem, headaches, numbness, seizures and speech difficulty.   Hematological:  Negative for adenopathy. Does not bruise/bleed easily.   Psychiatric/Behavioral:  Negative for decreased concentration, depression, sleep disturbance and suicidal ideas. The patient is not nervous/anxious.         Test Results:        Labs:   Lab Results   Component Value Date    HGB 14.9 08/12/2024    HCT 45.5 08/12/2024     (H) 08/12/2024     (H) 08/12/2024    WBC 8.27 08/12/2024    NRBC 0 08/12/2024     Lab Results   Component Value Date     (L) 12/29/2015    K 4.2 12/04/2023     12/04/2023    CO2 27 12/04/2023    ANIONGAP 13 12/29/2015    BUN 11 12/04/2023    CREATININE 0.71 12/04/2023    GLUCOSE 172 (H) 12/29/2015    GLUF 101 (H) 12/04/2023    CALCIUM 9.8 12/04/2023    CORRECTEDCA 10.1 01/29/2022    AST 15 12/04/2023    ALT 12 12/04/2023    ALKPHOS 86 12/04/2023    PROT 7.9 09/05/2015    BILITOT 0.47 09/05/2015    EGFR 82 12/04/2023           Imaging: No results found.          Allergies: No Known Allergies  Current Medications: Reviewed  PMH/FH/SH:  Reviewed      Physical Exam:    There is no height or weight on file to calculate BSA.    Ht Readings from Last 3 Encounters:   06/11/24 5' 2\" (1.575 m)   04/30/24 5' 2.5\" (1.588 m)   12/06/23 5' 2.5\" (1.588 m)        Wt Readings from Last 3 Encounters:   06/11/24 83.5 " kg (184 lb)   04/30/24 84.4 kg (186 lb)   12/06/23 81.9 kg (180 lb 9.6 oz)        Temp Readings from Last 3 Encounters:   06/11/24 98.2 °F (36.8 °C) (Tympanic Core)   12/06/23 98 °F (36.7 °C)   05/09/23 98.4 °F (36.9 °C)        BP Readings from Last 3 Encounters:   06/11/24 138/80   04/30/24 128/74   12/06/23 142/70             Physical Exam  Vitals reviewed.   Constitutional:       General: She is not in acute distress.     Appearance: She is well-developed. She is not diaphoretic.   HENT:      Head: Normocephalic and atraumatic.   Eyes:      Conjunctiva/sclera: Conjunctivae normal.   Neck:      Trachea: No tracheal deviation.   Cardiovascular:      Rate and Rhythm: Normal rate and regular rhythm.      Heart sounds: No murmur heard.     No friction rub. No gallop.   Pulmonary:      Effort: Pulmonary effort is normal. No respiratory distress.      Breath sounds: Normal breath sounds. No wheezing or rales.   Chest:      Chest wall: No tenderness.   Abdominal:      General: There is no distension.      Palpations: Abdomen is soft.      Tenderness: There is no abdominal tenderness.   Musculoskeletal:      Cervical back: Normal range of motion and neck supple.      Right lower leg: No edema.      Left lower leg: No edema.   Lymphadenopathy:      Cervical: No cervical adenopathy.   Skin:     General: Skin is warm and dry.      Coloration: Skin is not pale.      Findings: No erythema.   Neurological:      Mental Status: She is alert and oriented to person, place, and time.   Psychiatric:         Behavior: Behavior normal.         Thought Content: Thought content normal.         Judgment: Judgment normal.               Emergency Contacts:    Extended Emergency Contact Information  Primary Emergency Contact: Miguel Angel Santiago   [facility]  Home Phone: [phone]  Relation: Son

## 2024-08-24 DIAGNOSIS — E03.9 ACQUIRED HYPOTHYROIDISM: ICD-10-CM

## 2024-08-26 RX ORDER — LEVOTHYROXINE SODIUM 112 UG/1
112 TABLET ORAL DAILY
Qty: 30 TABLET | Refills: 3 | Status: SHIPPED | OUTPATIENT
Start: 2024-08-26

## 2024-10-03 NOTE — PROGRESS NOTES
Cardiology Follow Up    Cierra Santiago  1945  [id number]  [facility] [place]  [address]  [place] [id number] [phone]    1. Essential (primary) hypertension  POCT ECG      2. Hyperlipidemia, unspecified hyperlipidemia type  POCT ECG      3. S/P drug eluting coronary stent placement  POCT ECG        Interval History: Patient is here for a f/u visit. She was hospitalized 1/2022 with an RCA STEMI.  Cardiac catheterization 1/28/2022 demonstrated a closed RCA with successful PCI and placement of a HANH.  Other vessels had mild diffuse disease.  Echocardiogram 9/2022 demonstrated LVEF of 60% with mild inferobasal HK.  AV sclerosis was noted.  Compared to prior echo 1/2022,  LV function appears improved.  Lipid profile 5/2023 demonstrated total cholesterol of 131 with an HDL of 47 and a calculated LDL of 65.  Patient is on Atorvastatin.  Patient has Essential Thrombocythemia and is on Hydroxyurea and followed by Heme/Onc.  Patient has had no chest pain or significant dyspnea.  Her vital signs are stable today. EKG today demonstrates NSR with PRWP with no significant change compared to 2/5/2021. The HTN and HLD are stable on her current medicine.  Patient is a non-smoker.    Patient Active Problem List   Diagnosis    Benign essential hypertension    Essential thrombocythemia (HCC)    Fatty liver    Hypothyroidism    IFG (impaired fasting glucose)    Insomnia    Osteopenia    Presence of drug-eluting stent in right coronary artery    Vertigo, benign paroxysmal, left    Atherosclerosis of native coronary artery of native heart with angina pectoris (HCC)    Mixed stress and urge urinary incontinence    Chronic right-sided low back pain without sciatica     Past Medical History:   Diagnosis Date    Biliary colic     last assessed: 01/05/16    Cholecystitis     Disease of thyroid gland     hypo    Hematuria     last assessed: 05/05/17     Hypercalcemia 2019    Hypertension     Hypokalemia 2016    Impaired fasting glucose 2013    Leukocytosis     last assessed: 16    Obesity (BMI 30.0-34.9) 2019    Prediabetes 11/10/2016    STEMI involving right coronary artery (HCC) 2022    Toxic nodular goiter     Uterine fibroid      Social History     Socioeconomic History    Marital status:      Spouse name: Not on file    Number of children: Not on file    Years of education: Not on file    Highest education level: Not on file   Occupational History    Occupation: Retired   Tobacco Use    Smoking status: Former     Current packs/day: 0.00     Average packs/day: 0.5 packs/day for 40.0 years (20.0 ttl pk-yrs)     Types: Cigarettes     Start date: 1972     Quit date: 2012     Years since quittin.7    Smokeless tobacco: Never   Vaping Use    Vaping status: Never Used   Substance and Sexual Activity    Alcohol use: No    Drug use: No    Sexual activity: Never   Other Topics Concern    Not on file   Social History Narrative    Not on file     Social Determinants of Health     Financial Resource Strain: Low Risk  (2023)    Overall Financial Resource Strain (CARDIA)     Difficulty of Paying Living Expenses: Not hard at all   Food Insecurity: No Food Insecurity (2022)    Hunger Vital Sign     Worried About Running Out of Food in the Last Year: Never true     Ran Out of Food in the Last Year: Never true   Transportation Needs: No Transportation Needs (2023)    PRAPARE - Transportation     Lack of Transportation (Medical): No     Lack of Transportation (Non-Medical): No   Physical Activity: Not on file   Stress: Not on file   Social Connections: Not on file   Intimate Partner Violence: Not on file   Housing Stability: Unknown (2022)    Housing Stability Vital Sign     Unable to Pay for Housing in the Last Year: No     Number of Places Lived in the Last Year: Not on file     Unstable Housing in  "the Last Year: No      Family History   Problem Relation Age of Onset    Hypothyroidism Mother     Heart disease Brother         Cardiac disorder     Past Surgical History:   Procedure Laterality Date    CARDIAC CATHETERIZATION N/A 1/28/2022    Procedure: Cardiac pci;  Surgeon: Noam Anaya MD;  Location: [facility];  Service: Cardiology    HYSTERECTOMY      INGRIS, with removal of both ovaries    FL LAPAROSCOPY SURG CHOLECYSTECTOMY N/A 3/9/2016    Procedure: LAPAROSCOPIC CHOLECYSTECTOMY ;  Surgeon: Blake Gonzalez MD;  Location: [facility];  Service: General    TUBAL LIGATION         Current Outpatient Medications:     amLODIPine (NORVASC) 5 mg tablet, Take 2 tablets by mouth once daily, Disp: 180 tablet, Rfl: 1    aspirin 81 MG tablet, Take 81 mg by mouth daily., Disp: , Rfl:     atorvastatin (LIPITOR) 40 mg tablet, Take 1 tablet by mouth once daily, Disp: 90 tablet, Rfl: 3    clopidogrel (PLAVIX) 75 mg tablet, Take 1 tablet by mouth once daily, Disp: 90 tablet, Rfl: 3    hydroxyurea (HYDREA) 500 mg capsule, TAKE 1 CAPSULE BY MOUTH EVERY OTHER DAY, Disp: 45 capsule, Rfl: 5    levothyroxine 112 mcg tablet, Take 1 tablet by mouth once daily, Disp: 30 tablet, Rfl: 3    lisinopril (ZESTRIL) 40 mg tablet, Take 1 tablet (40 mg total) by mouth daily, Disp: 100 tablet, Rfl: 1    metoprolol tartrate (LOPRESSOR) 25 mg tablet, Take 1 tablet (25 mg total) by mouth every 12 (twelve) hours, Disp: 180 tablet, Rfl: 3    nitroglycerin (NITROSTAT) 0.4 mg SL tablet, Place 1 tablet (0.4 mg total) under the tongue every 5 (five) minutes as needed for chest pain, Disp: 30 tablet, Rfl: 3  No Known Allergies    Labs:not applicable  Imaging: No results found.    Review of Systems:  Review of Systems   All other systems reviewed and are negative.      Physical Exam:  /80 (BP Location: Right arm, Patient Position: Sitting, Cuff Size: Standard)   Pulse 69   Ht 5' 2\" (1.575 m)   Wt 80.7 kg (178 lb)   SpO2 97%   BMI 32.56 kg/m² "   Physical Exam  Vitals reviewed.   Constitutional:       Appearance: She is well-developed.   HENT:      Head: Normocephalic and atraumatic.   Eyes:      Conjunctiva/sclera: Conjunctivae normal.      Pupils: Pupils are equal, round, and reactive to light.   Cardiovascular:      Rate and Rhythm: Normal rate.      Heart sounds: Normal heart sounds.   Pulmonary:      Effort: Pulmonary effort is normal.      Breath sounds: Normal breath sounds.   Musculoskeletal:      Cervical back: Normal range of motion and neck supple.   Skin:     General: Skin is warm and dry.   Neurological:      Mental Status: She is alert and oriented to person, place, and time.         Discussion/Summary:I will continue the patient's present medical regimen.  The patient appears well compensated.  I have asked the patient to call if there is a problem in the interim otherwise I will see the patient in six months time.

## 2024-10-08 ENCOUNTER — OFFICE VISIT (OUTPATIENT)
Dept: CARDIOLOGY CLINIC | Facility: CLINIC | Age: 79
End: 2024-10-08
Payer: COMMERCIAL

## 2024-10-08 VITALS
WEIGHT: 178 LBS | HEIGHT: 62 IN | DIASTOLIC BLOOD PRESSURE: 80 MMHG | HEART RATE: 69 BPM | SYSTOLIC BLOOD PRESSURE: 130 MMHG | OXYGEN SATURATION: 97 % | BODY MASS INDEX: 32.76 KG/M2

## 2024-10-08 DIAGNOSIS — Z95.5 S/P DRUG ELUTING CORONARY STENT PLACEMENT: ICD-10-CM

## 2024-10-08 DIAGNOSIS — I10 ESSENTIAL (PRIMARY) HYPERTENSION: Primary | ICD-10-CM

## 2024-10-08 DIAGNOSIS — E78.5 HYPERLIPIDEMIA, UNSPECIFIED HYPERLIPIDEMIA TYPE: ICD-10-CM

## 2024-10-08 PROCEDURE — 93000 ELECTROCARDIOGRAM COMPLETE: CPT | Performed by: INTERNAL MEDICINE

## 2024-10-08 PROCEDURE — 99214 OFFICE O/P EST MOD 30 MIN: CPT | Performed by: INTERNAL MEDICINE

## 2024-10-08 NOTE — PATIENT INSTRUCTIONS
I will continue the patient's present medical regimen.  The patient appears well compensated.  I have asked the patient to call if there is a problem in the interim otherwise I will see the patient in six months time.   Past Medical History


Past Medical History:  Diabetes-Type II, Hypertension


Additional Past Medical Histor:  Chronic knee pain


Past Surgical History:  Cholecystectomy, 


Smoking Status:  Never Smoker


Alcohol Use:  Occasionally


Drug Use:  None





General Adult


HPI:


HPI:





Patient is a 67  year old female who presents with works at [employer] 

facility and was brought here by EMS.  She states this morning she had been 

constipated so she drank cranberry and prune juice.  She states that she had not

ate anything and she came to work and was passing medications once only her sto

mach started cramping really bad she went to the bathroom began having diarrhea 

and some hard stool and then became dizzy, lightheaded and nauseated and 

diaphoretic.  She states she did not lose consciousness.  She states that she 

does feel better now and at this time denies abdominal pain, dizziness, 

lightheadedness, nausea.  She states she still feels like she needs to have 

another bowel movement but she is too scared to because of what happened.  She 

denies chest pain, syncope, headache, fever, cough, shortness of breath, 

vomiting, focal weakness, numbness or tingling, vision change.  Patient does 

have a history of diabetes, , cholecystectomy and hypertension.  She 

denies taking any of her medications this morning.





Review of Systems:


Review of Systems:


Constitutional:   Denies fever or chills. []


Eyes:   Denies change in visual acuity. []


HENT:   Denies nasal congestion or sore throat. [] 


Respiratory:   Denies cough or shortness of breath. [] 


Cardiovascular:   Denies chest pain or edema. [] 


GI:   + abdominal pain, +nausea, denies vomiting, bloody stools or +diarrhea. []

 


:  Denies dysuria. [] 


Musculoskeletal:   Denies back pain or joint pain. [] 


Integument:   Denies rash. + Diaphoretic [] 


Neurologic:   Denies headache, focal weakness or sensory changes. + Near syncope

 [] 


Endocrine:   Denies polyuria or polydipsia. [] 


Lymphatic:  Denies swollen glands. [] 


Psychiatric:  Denies depression or anxiety. []





Heart Score:


C/O Chest Pain:  No


HEART Score for Chest Pain:  








HEART Score for Chest Pain Response (Comments) Value


 


History Slighlty/Non-Suspicious 0


 


ECG Nonspecific Repolarizatio 1


 


Age >45 - < 65 1


 


Risk Factors                            1 or 2 Risk Factors 1


 


Troponin < Normal Limit 0


 


Total  3








Risk Factors:


Risk Factors:  DM, Current or recent (<one month) smoker, HTN, HLP, family 

history of CAD, obesity.


Risk Scores:


Score 0 - 3:  2.5% MACE over next 6 weeks - Discharge Home


Score 4 - 6:  20.3% MACE over next 6 weeks - Admit for Clinical Observation


Score 7 - 10:  72.7% MACE over next 6 weeks - Early Invasive Strategies





Allergies:


Allergies:





Allergies








Coded Allergies Type Severity Reaction Last Updated Verified


 


  No Known Drug Allergies    16 No











Physical Exam:


PE:





Constitutional: Well developed, well nourished, no acute distress, non-toxic 

appearance. []


HENT: Normocephalic, atraumatic, bilateral external ears normal, oropharynx mo

ist, no oral exudates, nose normal. []


Eyes: PERRLA, EOMI, conjunctiva normal, no discharge. [] 


Neck: Normal range of motion, no tenderness, supple, no stridor. [] 


Cardiovascular:Heart rate regular rhythm, no murmur []


Lungs & Thorax:  Bilateral breath sounds clear to auscultation []


Abdomen: Bowel sounds normal, soft, no tenderness, no masses, no pulsatile 

masses. [] 


Skin: Warm, dry, no erythema, no rash. [] 


Back: No tenderness, no CVA tenderness. [] 


Extremities: No tenderness, no cyanosis, no clubbing, ROM intact, no edema. [] 


Neurologic: Alert and oriented X 3, normal motor function, normal sensory 

function, no focal deficits noted. []


Psychologic: Affect normal, judgement normal, mood normal. []





**Normal physical exam





EKG:


EK and read by Dr. Linder is a sinus rhythm with a right bundle branch block but 

no STEMI





Radiology/Procedures:


Radiology/Procedures:


[]


Impression:


                            [facility]


                    [address]  [place], [place] [id number] [phone]


                                        


                                 IMAGING REPORT





                                     Signed





PATIENT: HEMALATHA PANTOJA ACCOUNT: [id number]     MRN#: [id number]


: 1954           LOCATION: ER              AGE: 67


SEX: F                    EXAM DT: 22         ACCESSION#: [id number]


STATUS: PRE ER            ORD. PHYSICIAN: TEE CONNOLLY


REASON: diarrhea, abd pain


PROCEDURE: ACUTE ABDOMEN SERIES





Acute Abdominal Series:





Technique:  PA view of the chest and supine and upright views of the abdomen 

were obtained.





History:  Pain.





Comparison: None.





Findings:





The lungs and pleural margins are clear. There is a paucity of bowel gas. There 

is no free air. 





Impression: 








No acute findings.





Electronically signed by: Danish Holman III, MD (2022 12:36 PM) [facility]














DICTATED and SIGNED BY:     DANISH HOLMAN III, MD


DATE:     22 1234





                            [facility]


                    [address] [place] [address]


                                 [phone]


                                        


                                 IMAGING REPORT





                                     Signed





PATIENT: HEMALATHA PANTOJA ACCOUNT: [id number]     MRN#: [id number]


: 1954           LOCATION: ER              AGE: 67


SEX: F                    EXAM DT: 22         ACCESSION#: [id number]


STATUS: PRE ER            ORD. PHYSICIAN: TEE CONNOLLY


REASON: near syncope, dizzy


PROCEDURE: CT HEAD WO CONTRAST





STUDY: CT head without contrast





INDICATION: Near syncope. Dizziness.





COMPARISON: 2016





TECHNIQUE: Axial CT imaging through the head without the use of intravenous 

contrast. Sagittal and coronal reformats were obtained.





One or more of the following individualized dose reduction techniques were 

utilized for this examination:  





1. Automated exposure control


2. Adjustment of the mA and/or kV according to patient size


3. Use of iterative reconstruction technique.





FINDINGS:





No acute intracranial hemorrhage. No mass effect, midline shift or 

hydrocephalus. Gray-white matter differentiation is maintained.





Intact calvarium. No layering fluid seen within the visualized paranasal 

sinuses. Unremarkable mastoid air cells and middle ears.





IMPRESSION:





No acute intracranial abnormality by CT.





Electronically signed by: JESUS LINCOLN MD (2022 1:10 PM) [facility]














DICTATED and SIGNED BY:     JESUS LINCOLN MD


DATE:     22 1303





Course & Med Decision Making:


Course & Med Decision Making


Pertinent Labs and Imaging studies reviewed. (See chart for details)





See HPI.  Alert and oriented x4.  Moving all extremities and is unable to dress 

herself.  Answers all questions appropriately.  Follows all commands.  Speaks in

 full clear sentences.  Abdomen soft and nontender.  Afebrile. 





 Orthostatics are normal.  CT head shows no acute findings.  Acute Abdominal 

series shows no acute findings.  Blood work unremarkable.  Patient is still 

feeling much better.  Patient denies any symptoms at this time.  Urinalysis 

shows no dehydration or infection.





[]





Dragon Disclaimer:


Dragon Disclaimer:


This electronic medical record was generated, in whole or in part, using a voice

 recognition dictation system.





NIHSS Stroke Scale


NIH Stroke Scale:  








NIH Stroke Scale Response (Comments) Value


 


Level of Consciousness:                 0 Alert/Responsive 0


 


LOC Questions:                          0 Answers both correctly 0


 


LOC Commands:                           0 Performs both tasks 0


 


Best Gaze:                              0 Normal 0


 


Visual:                                 0 No visual loss 0


 


Facial Palsy:                           0 Normal, symmetrical 0


 


Motor - Left Arm                        0 No drift 0


 


Motor - Right Arm                       0 No drift 0


 


Motor - Left Leg                        0 No drift 0


 


Motor: Right Leg                        0 No drift 0


 


Limb Ataxia:                            0 Absent 0


 


Sensory:                                0 No loss 0


 


Best Language:                          0 Normal 0


 


Dysathria:                              0 Normal 0


 


Extinction and Inattention:             0 Normal 0


 


Total  0











Departure


Departure


Impression:  


   Primary Impression:  


   Vasovagal near syncope


Disposition:  01 HOME / SELF CARE / HOMELESS


Condition:  STABLE


Referrals:  


DANISH DAMIAN DO (PCP)


Patient Instructions:  Vagal Nerve Stimulation





Additional Instructions:  


Follow-up with primary care provider if needed.  Make sure you drink plenty of 

fluids to stay hydrated.  Give if any symptoms come back or worsen you can 

return the emergency room.











TEE CONNOLLY             2022 12:22

## 2024-12-11 DIAGNOSIS — E03.9 ACQUIRED HYPOTHYROIDISM: ICD-10-CM

## 2024-12-12 RX ORDER — LEVOTHYROXINE SODIUM 112 UG/1
112 TABLET ORAL DAILY
Qty: 90 TABLET | Refills: 0 | Status: SHIPPED | OUTPATIENT
Start: 2024-12-12

## 2024-12-17 DIAGNOSIS — I21.3 STEMI (ST ELEVATION MYOCARDIAL INFARCTION) (HCC): ICD-10-CM

## 2024-12-17 DIAGNOSIS — I21.11 STEMI INVOLVING RIGHT CORONARY ARTERY (HCC): ICD-10-CM

## 2024-12-18 ENCOUNTER — TELEPHONE (OUTPATIENT)
Age: 79
End: 2024-12-18

## 2024-12-18 RX ORDER — CLOPIDOGREL BISULFATE 75 MG/1
75 TABLET ORAL DAILY
Qty: 90 TABLET | Refills: 0 | Status: SHIPPED | OUTPATIENT
Start: 2024-12-18

## 2024-12-18 RX ORDER — ATORVASTATIN CALCIUM 40 MG/1
40 TABLET, FILM COATED ORAL DAILY
Qty: 90 TABLET | Refills: 0 | Status: SHIPPED | OUTPATIENT
Start: 2024-12-18

## 2024-12-18 NOTE — TELEPHONE ENCOUNTER
I left message for pt regarding appt on 2/19/25 with Clara Katz. That appt has been rescheduled due to a change in providers schedule. New appt is scheduled for 2/25/25, advised pt to call back if new appt does not work.

## 2025-01-03 ENCOUNTER — RA CDI HCC (OUTPATIENT)
Dept: OTHER | Facility: HOSPITAL | Age: 80
End: 2025-01-03

## 2025-01-03 NOTE — PROGRESS NOTES
HCC coding opportunities       Chart reviewed, no opportunity found: CHART REVIEWED, NO OPPORTUNITY FOUND     Please review and document all HCC diagnoses using M.E.A.T. criteria, as risk scores reset with the New Year.     Patients Insurance     Medicare Insurance: [employer]

## 2025-01-05 ENCOUNTER — APPOINTMENT (OUTPATIENT)
Dept: LAB | Age: 80
End: 2025-01-05
Payer: COMMERCIAL

## 2025-01-05 DIAGNOSIS — I25.119 ATHEROSCLEROSIS OF NATIVE CORONARY ARTERY OF NATIVE HEART WITH ANGINA PECTORIS (HCC): ICD-10-CM

## 2025-01-05 LAB
CHOLEST SERPL-MCNC: 142 MG/DL (ref ?–200)
HDLC SERPL-MCNC: 54 MG/DL
LDLC SERPL CALC-MCNC: 67 MG/DL (ref 0–100)
NONHDLC SERPL-MCNC: 88 MG/DL
TRIGL SERPL-MCNC: 106 MG/DL (ref ?–150)

## 2025-01-05 PROCEDURE — 36415 COLL VENOUS BLD VENIPUNCTURE: CPT

## 2025-01-05 PROCEDURE — 80061 LIPID PANEL: CPT

## 2025-01-22 DIAGNOSIS — I10 BENIGN HYPERTENSION: ICD-10-CM

## 2025-01-22 DIAGNOSIS — I10 BENIGN ESSENTIAL HYPERTENSION: ICD-10-CM

## 2025-01-22 RX ORDER — AMLODIPINE BESYLATE 5 MG/1
10 TABLET ORAL DAILY
Qty: 180 TABLET | Refills: 0 | Status: SHIPPED | OUTPATIENT
Start: 2025-01-22

## 2025-01-23 DIAGNOSIS — E03.9 ACQUIRED HYPOTHYROIDISM: ICD-10-CM

## 2025-01-23 RX ORDER — LEVOTHYROXINE SODIUM 112 UG/1
112 TABLET ORAL DAILY
Qty: 30 TABLET | Refills: 0 | Status: SHIPPED | OUTPATIENT
Start: 2025-01-23

## 2025-01-26 DIAGNOSIS — D47.3 ESSENTIAL THROMBOCYTHEMIA (HCC): ICD-10-CM

## 2025-01-27 DIAGNOSIS — I10 BENIGN ESSENTIAL HYPERTENSION: ICD-10-CM

## 2025-01-27 RX ORDER — LISINOPRIL 40 MG/1
40 TABLET ORAL DAILY
Qty: 100 TABLET | Refills: 0 | Status: SHIPPED | OUTPATIENT
Start: 2025-01-27

## 2025-01-27 RX ORDER — HYDROXYUREA 500 MG/1
CAPSULE ORAL
Qty: 45 CAPSULE | Refills: 4 | Status: SHIPPED | OUTPATIENT
Start: 2025-01-27

## 2025-01-27 NOTE — TELEPHONE ENCOUNTER
Reason for call:   [x] Refill   [] Prior Auth  [] Other:     Office:   [x] PCP/Provider -  Karla Thomas,    [] Specialty/Provider -     Medication: lisinopril (ZESTRIL) 40 mg tablet     Dose/Frequency: Take 1 tablet (40 mg total) by mouth daily     Quantity: 100    Pharmacy: [facility] [address] [place] [address]     Does the patient have enough for 3 days?   [] Yes   [x] No - Send as HP to POD

## 2025-02-09 DIAGNOSIS — I21.3 STEMI (ST ELEVATION MYOCARDIAL INFARCTION) (HCC): ICD-10-CM

## 2025-02-10 RX ORDER — METOPROLOL TARTRATE 25 MG/1
25 TABLET, FILM COATED ORAL 2 TIMES DAILY
Qty: 180 TABLET | Refills: 0 | Status: SHIPPED | OUTPATIENT
Start: 2025-02-10

## 2025-02-13 ENCOUNTER — OFFICE VISIT (OUTPATIENT)
Age: 80
End: 2025-02-13
Payer: COMMERCIAL

## 2025-02-13 VITALS
BODY MASS INDEX: 33.31 KG/M2 | HEART RATE: 69 BPM | WEIGHT: 181 LBS | RESPIRATION RATE: 16 BRPM | OXYGEN SATURATION: 98 % | DIASTOLIC BLOOD PRESSURE: 72 MMHG | SYSTOLIC BLOOD PRESSURE: 128 MMHG | HEIGHT: 62 IN

## 2025-02-13 DIAGNOSIS — I25.119 ATHEROSCLEROSIS OF NATIVE CORONARY ARTERY OF NATIVE HEART WITH ANGINA PECTORIS (HCC): ICD-10-CM

## 2025-02-13 DIAGNOSIS — Z23 ENCOUNTER FOR IMMUNIZATION: ICD-10-CM

## 2025-02-13 DIAGNOSIS — N39.46 MIXED STRESS AND URGE URINARY INCONTINENCE: ICD-10-CM

## 2025-02-13 DIAGNOSIS — Z00.00 MEDICARE ANNUAL WELLNESS VISIT, SUBSEQUENT: ICD-10-CM

## 2025-02-13 DIAGNOSIS — Z12.31 ENCOUNTER FOR SCREENING MAMMOGRAM FOR BREAST CANCER: ICD-10-CM

## 2025-02-13 DIAGNOSIS — I10 BENIGN ESSENTIAL HYPERTENSION: Primary | ICD-10-CM

## 2025-02-13 DIAGNOSIS — D47.3 ESSENTIAL THROMBOCYTHEMIA (HCC): ICD-10-CM

## 2025-02-13 DIAGNOSIS — M85.80 OSTEOPENIA, UNSPECIFIED LOCATION: ICD-10-CM

## 2025-02-13 DIAGNOSIS — K76.0 FATTY LIVER: ICD-10-CM

## 2025-02-13 DIAGNOSIS — E03.9 ACQUIRED HYPOTHYROIDISM: ICD-10-CM

## 2025-02-13 PROCEDURE — 99214 OFFICE O/P EST MOD 30 MIN: CPT | Performed by: INTERNAL MEDICINE

## 2025-02-13 PROCEDURE — G0439 PPPS, SUBSEQ VISIT: HCPCS | Performed by: INTERNAL MEDICINE

## 2025-02-13 PROCEDURE — 90662 IIV NO PRSV INCREASED AG IM: CPT | Performed by: INTERNAL MEDICINE

## 2025-02-13 PROCEDURE — G2211 COMPLEX E/M VISIT ADD ON: HCPCS | Performed by: INTERNAL MEDICINE

## 2025-02-13 PROCEDURE — G0008 ADMIN INFLUENZA VIRUS VAC: HCPCS | Performed by: INTERNAL MEDICINE

## 2025-02-13 NOTE — PROGRESS NOTES
Name: Cierra Santiago      : 1945      MRN: [id number]  Encounter Provider: Karla Thomas DO  Encounter Date: 2025   Encounter department: [facility] INTERNAL MEDICINE    Assessment & Plan  Benign essential hypertension  -normotensive  -continue amlodipine 5mg two tabs daily, lisinopril 40mg daily and metoprolol succ 25mg q12  -overdue for BMP  -follow low sodium diet  Orders:  •  Comprehensive metabolic panel; Future  •  TSH, 3rd generation with Free T4 reflex; Future  •  Urinalysis with microscopic; Future    Atherosclerosis of native coronary artery of native heart with angina pectoris (HCC)  -s/p HANH  -continue ASSA, plavix, lipitor, metoprolol and lisinopril per Cardio       Mixed stress and urge urinary incontinence  -lose weight, avoid bladder irritants  -perform kegel's  -also consider pelvic floor PT       Essential thrombocythemia (HCC)  -positive DEAN mutation  -CBCD followed by Heme  -continue ASA and hydroxyurea 500mg every other day       Fatty liver  -she has not pursued US elastography previously ordered  -lose weight   -follow low fat, low cholesterol diet  -continue lipitor       Osteopenia, unspecified location  -continues to decline surveillance DXA  -advised calcium, vitamin D and strength training       Acquired hypothyroidism  -overdue for surveillance TSH  -continue on levothyroxine 112mcg daily for now  Orders:  •  TSH, 3rd generation with Free T4 reflex; Future  •  Comprehensive metabolic panel; Future    Medicare annual wellness visit, subsequent         Encounter for screening mammogram for breast cancer    Orders:  •  Mammo screening bilateral w 3d and cad; Future    Encounter for immunization    Orders:  •  influenza vaccine, high-dose, PF 0.5 mL (Fluzone High Dose)      Depression Screening and Follow-up Plan: Patient was screened for depression during today's encounter. They screened negative with a PHQ-2 score of 0.      Urinary Incontinence Plan of Care:  counseling topics discussed: practice Kegel (pelvic floor strengthening) exercises, limit alcohol, caffeine, spicy foods, and acidic foods, limiting fluid intake 3-4 hours before bed and preventing constipation.       Preventive health issues were discussed with patient, and age appropriate screening tests were ordered as noted in patient's After Visit Summary. Personalized health advice and appropriate referrals for health education or preventive services given if needed, as noted in patient's After Visit Summary.    History of Present Illness     Her grand daughter is getting  in October in the [facility] and would like to lose weight.    Infrequently checks her blood pressures.         Patient Care Team:  Karla Thomas DO as PCP - General  MD Xavier Scott Od (Optometry)  Antonio Azul MD (Cardiology)  Silvina Echols PA-C (Hematology and Oncology)    Review of Systems   Constitutional:  Negative for activity change, appetite change and unexpected weight change.   HENT:  Negative for congestion, postnasal drip and rhinorrhea.    Respiratory:  Negative for cough, shortness of breath and wheezing.    Cardiovascular:  Negative for chest pain, palpitations and leg swelling.   Gastrointestinal:  Negative for abdominal pain, diarrhea and nausea.   Genitourinary:         Incontinence   Neurological:  Negative for dizziness and headaches.   Psychiatric/Behavioral:  Negative for dysphoric mood.      Medical History Reviewed by provider this encounter:  Tobacco  Allergies  Meds  Problems  Med Hx  Surg Hx  Fam Hx       Annual Wellness Visit Questionnaire   Cierra is here for her Subsequent Wellness visit. Last Medicare Wellness visit information reviewed, patient interviewed and updates made to the record today.      Health Risk Assessment:   Patient rates overall health as good. Patient feels that their physical health rating is slightly better. Patient is satisfied with  their life. Eyesight was rated as same. Hearing was rated as same. Patient feels that their emotional and mental health rating is slightly better. Patients states they are never, rarely angry. Patient states they are sometimes unusually tired/fatigued. Pain experienced in the last 7 days has been some. Patient's pain rating has been 3/10. Patient states that she has experienced weight loss or gain in last 6 months.     Depression Screening:   PHQ-2 Score: 0      Fall Risk Screening:   In the past year, patient has experienced: no history of falling in past year      Urinary Incontinence Screening:   Patient has leaked urine accidently in the last six months.     Home Safety:  Patient does not have trouble with stairs inside or outside of their home. Patient has working smoke alarms and has working carbon monoxide detector. Home safety hazards include: none.     Nutrition:   Current diet is No Added Salt.     Medications:   Patient is not currently taking any over-the-counter supplements. Patient is able to manage medications.     Activities of Daily Living (ADLs)/Instrumental Activities of Daily Living (IADLs):   Walk and transfer into and out of bed and chair?: Yes  Dress and groom yourself?: Yes    Bathe or shower yourself?: Yes    Feed yourself? Yes  Do your laundry/housekeeping?: Yes  Manage your money, pay your bills and track your expenses?: Yes  Make your own meals?: Yes    Do your own shopping?: Yes    Durable Medical Equipment Suppliers  pt does not know    Previous Hospitalizations:   Any hospitalizations or ED visits within the last 12 months?: No      Advance Care Planning:   Living will: No    Durable POA for healthcare: Yes    Advanced directive: No      Cognitive Screening:   Provider or family/friend/caregiver concerned regarding cognition?: No    PREVENTIVE SCREENINGS      Cardiovascular Screening:    General: Screening Not Indicated and History Lipid Disorder      Colorectal Cancer Screening:      General: Risks and Benefits Discussed      Breast Cancer Screening:     General: Risks and Benefits Discussed    Due for: Mammogram        Cervical Cancer Screening:    General: Screening Not Indicated      Osteoporosis Screening:    General: Screening Not Indicated      Abdominal Aortic Aneurysm (AAA) Screening:        General: Screening Not Indicated      Lung Cancer Screening:     General: Screening Not Indicated      Hepatitis C Screening:    General: Screening Current    Screening, Brief Intervention, and Referral to Treatment (SBIRT)     Screening  Typical number of drinks in a day: 0  Typical number of drinks in a week: 0  Interpretation: Low risk drinking behavior.    Single Item Drug Screening:  How often have you used an illegal drug (including marijuana) or a prescription medication for non-medical reasons in the past year? never    Single Item Drug Screen Score: 0  Interpretation: Negative screen for possible drug use disorder    Brief Intervention  Alcohol & drug use screenings were reviewed. No concerns regarding substance use disorder identified.     Time Spent  Time spent screening/evaluating the patient for alcohol misuse: 1 minutes.     Other Counseling Topics:   Car/seat belt/driving safety, skin self-exam, sunscreen and regular weightbearing exercise and calcium and vitamin D intake. Immunizations discussed.  Influenza vaccine due today.  Defers COVID, RSV, shingrix and tdap.    Social Drivers of Health     Financial Resource Strain: Low Risk  (12/6/2023)    Overall Financial Resource Strain (CARDIA)    • Difficulty of Paying Living Expenses: Not hard at all   Food Insecurity: No Food Insecurity (2/13/2025)    Hunger Vital Sign    • Worried About Running Out of Food in the Last Year: Never true    • Ran Out of Food in the Last Year: Never true   Transportation Needs: No Transportation Needs (2/13/2025)    PRAPARE - Transportation    • Lack of Transportation (Medical): No    • Lack of  "Transportation (Non-Medical): No   Housing Stability: Low Risk  (2/13/2025)    Housing Stability Vital Sign    • Unable to Pay for Housing in the Last Year: No    • Number of Times Moved in the Last Year: 0    • Homeless in the Last Year: No   Utilities: Not At Risk (2/13/2025)    [facility] Utilities    • Threatened with loss of utilities: No     No results found.    Objective   /72 (BP Location: Left arm, Patient Position: Sitting, Cuff Size: Large)   Pulse 69   Resp 16   Ht 5' 2\" (1.575 m)   Wt 82.1 kg (181 lb)   SpO2 98%   BMI 33.11 kg/m²     Physical Exam  Vitals reviewed.   Constitutional:       General: She is not in acute distress.     Appearance: She is obese.      Comments: Smells of smoke   HENT:      Head: Normocephalic.      Right Ear: Tympanic membrane and ear canal normal.      Left Ear: Tympanic membrane and ear canal normal.      Nose: Nose normal.      Mouth/Throat:      Mouth: Mucous membranes are moist.   Eyes:      Extraocular Movements: Extraocular movements intact.      Conjunctiva/sclera: Conjunctivae normal.      Pupils: Pupils are equal, round, and reactive to light.   Neck:      Thyroid: No thyromegaly or thyroid tenderness.      Vascular: No carotid bruit.   Cardiovascular:      Rate and Rhythm: Normal rate and regular rhythm.      Pulses: Normal pulses.      Heart sounds: Normal heart sounds.   Pulmonary:      Effort: Pulmonary effort is normal. No respiratory distress.      Breath sounds: Normal breath sounds. No wheezing.   Abdominal:      General: Bowel sounds are normal. There is no distension.      Palpations: Abdomen is soft.      Tenderness: There is no abdominal tenderness.   Musculoskeletal:      Cervical back: Neck supple. No tenderness.      Right lower leg: No edema.      Left lower leg: No edema.   Lymphadenopathy:      Cervical: No cervical adenopathy.   Skin:     General: Skin is dry.      Coloration: Skin is not pale.   Neurological:      Mental Status: She is alert " and oriented to person, place, and time.   Psychiatric:         Mood and Affect: Mood normal.

## 2025-02-13 NOTE — ASSESSMENT & PLAN NOTE
-normotensive  -continue amlodipine 5mg two tabs daily, lisinopril 40mg daily and metoprolol succ 25mg q12  -overdue for BMP  -follow low sodium diet  Orders:  •  Comprehensive metabolic panel; Future  •  TSH, 3rd generation with Free T4 reflex; Future  •  Urinalysis with microscopic; Future

## 2025-02-13 NOTE — ASSESSMENT & PLAN NOTE
-she has not pursued US elastography previously ordered  -lose weight   -follow low fat, low cholesterol diet  -continue lipitor

## 2025-02-13 NOTE — ASSESSMENT & PLAN NOTE
-positive DEAN mutation  -CBCD followed by Heme  -continue ASA and hydroxyurea 500mg every other day

## 2025-02-13 NOTE — PATIENT INSTRUCTIONS
Medicare Preventive Visit Patient Instructions  Thank you for completing your Welcome to Medicare Visit or Medicare Annual Wellness Visit today. Your next wellness visit will be due in one year (2/14/2026).  The screening/preventive services that you may require over the next 5-10 years are detailed below. Some tests may not apply to you based off risk factors and/or age. Screening tests ordered at today's visit but not completed yet may show as past due. Also, please note that scanned in results may not display below.  Preventive Screenings:  Service Recommendations Previous Testing/Comments   Colorectal Cancer Screening  * Colonoscopy    * Fecal Occult Blood Test (FOBT)/Fecal Immunochemical Test (FIT)  * Fecal DNA/Cologuard Test  * Flexible Sigmoidoscopy Age: 45-75 years old   Colonoscopy: every 10 years (may be performed more frequently if at higher risk)  OR  FOBT/FIT: every 1 year  OR  Cologuard: every 3 years  OR  Sigmoidoscopy: every 5 years  Screening may be recommended earlier than age 45 if at higher risk for colorectal cancer. Also, an individualized decision between you and your healthcare provider will decide whether screening between the ages of 76-85 would be appropriate. Colonoscopy: 01/16/2013  FOBT/FIT: Not on file  Cologuard: Not on file  Sigmoidoscopy: Not on file          Breast Cancer Screening Age: 40+ years old  Frequency: every 1-2 years  Not required if history of left and right mastectomy Mammogram: 01/24/2020        Cervical Cancer Screening Between the ages of 21-29, pap smear recommended once every 3 years.   Between the ages of 30-65, can perform pap smear with HPV co-testing every 5 years.   Recommendations may differ for women with a history of total hysterectomy, cervical cancer, or abnormal pap smears in past. Pap Smear: Not on file    Screening Not Indicated   Hepatitis C Screening Once for adults born between 1945 and 1965  More frequently in patients at high risk for Hepatitis C  Hep C Antibody: 10/14/2018    Screening Current   Diabetes Screening 1-2 times per year if you're at risk for diabetes or have pre-diabetes Fasting glucose: 101 mg/dL (12/4/2023)  A1C: 5.3 % (5/1/2022)      Cholesterol Screening Once every 5 years if you don't have a lipid disorder. May order more often based on risk factors. Lipid panel: 01/05/2025    Screening Not Indicated  History Lipid Disorder     Other Preventive Screenings Covered by Medicare:  Abdominal Aortic Aneurysm (AAA) Screening: covered once if your at risk. You're considered to be at risk if you have a family history of AAA.  Lung Cancer Screening: covers low dose CT scan once per year if you meet all of the following conditions: (1) Age 55-77; (2) No signs or symptoms of lung cancer; (3) Current smoker or have quit smoking within the last 15 years; (4) You have a tobacco smoking history of at least 20 pack years (packs per day multiplied by number of years you smoked); (5) You get a written order from a healthcare provider.  Glaucoma Screening: covered annually if you're considered high risk: (1) You have diabetes OR (2) Family history of glaucoma OR (3)  aged 50 and older OR (4)  American aged 65 and older  Osteoporosis Screening: covered every 2 years if you meet one of the following conditions: (1) You're estrogen deficient and at risk for osteoporosis based off medical history and other findings; (2) Have a vertebral abnormality; (3) On glucocorticoid therapy for more than 3 months; (4) Have primary hyperparathyroidism; (5) On osteoporosis medications and need to assess response to drug therapy.   Last bone density test (DXA Scan): Not on file.  HIV Screening: covered annually if you're between the age of 15-65. Also covered annually if you are younger than 15 and older than 65 with risk factors for HIV infection. For pregnant patients, it is covered up to 3 times per pregnancy.    Immunizations:  Immunization  Recommendations   Influenza Vaccine Annual influenza vaccination during flu season is recommended for all persons aged >= 6 months who do not have contraindications   Pneumococcal Vaccine   * Pneumococcal conjugate vaccine = PCV13 (Prevnar 13), PCV15 (Vaxneuvance), PCV20 (Prevnar 20)  * Pneumococcal polysaccharide vaccine = PPSV23 (Pneumovax) Adults 19-65 yo with certain risk factors or if 65+ yo  If never received any pneumonia vaccine: recommend Prevnar 20 (PCV20)  Give PCV20 if previously received 1 dose of PCV13 or PPSV23   Hepatitis B Vaccine 3 dose series if at intermediate or high risk (ex: diabetes, end stage renal disease, liver disease)   Respiratory syncytial virus (RSV) Vaccine - COVERED BY MEDICARE PART D  * RSVPreF3 (Arexvy) CDC recommends that adults 60 years of age and older may receive a single dose of RSV vaccine using shared clinical decision-making (SCDM)   Tetanus (Td) Vaccine - COST NOT COVERED BY MEDICARE PART B Following completion of primary series, a booster dose should be given every 10 years to maintain immunity against tetanus. Td may also be given as tetanus wound prophylaxis.   Tdap Vaccine - COST NOT COVERED BY MEDICARE PART B Recommended at least once for all adults. For pregnant patients, recommended with each pregnancy.   Shingles Vaccine (Shingrix) - COST NOT COVERED BY MEDICARE PART B  2 shot series recommended in those 19 years and older who have or will have weakened immune systems or those 50 years and older     Health Maintenance Due:      Topic Date Due   • Lung Cancer Screening  Never done   • Colorectal Cancer Screening  01/16/2014   • Breast Cancer Screening: Mammogram  01/24/2021   • Hepatitis C Screening  Completed     Immunizations Due:      Topic Date Due   • Influenza Vaccine (1) 09/01/2024   • COVID-19 Vaccine (2 - 2024-25 season) 09/01/2024     Advance Directives   What are advance directives?  Advance directives are legal documents that state your wishes and plans  for medical care. These plans are made ahead of time in case you lose your ability to make decisions for yourself. Advance directives can apply to any medical decision, such as the treatments you want, and if you want to donate organs.   What are the types of advance directives?  There are many types of advance directives, and each state has rules about how to use them. You may choose a combination of any of the following:  Living will:  This is a written record of the treatment you want. You can also choose which treatments you do not want, which to limit, and which to stop at a certain time. This includes surgery, medicine, IV fluid, and tube feedings.   Durable power of  for healthcare (DPAHC):  This is a written record that states who you want to make healthcare choices for you when you are unable to make them for yourself. This person, called a proxy, is usually a family member or a friend. You may choose more than 1 proxy.  Do not resuscitate (DNR) order:  A DNR order is used in case your heart stops beating or you stop breathing. It is a request not to have certain forms of treatment, such as CPR. A DNR order may be included in other types of advance directives.  Medical directive:  This covers the care that you want if you are in a coma, near death, or unable to make decisions for yourself. You can list the treatments you want for each condition. Treatment may include pain medicine, surgery, blood transfusions, dialysis, IV or tube feedings, and a ventilator (breathing machine).  Values history:  This document has questions about your views, beliefs, and how you feel and think about life. This information can help others choose the care that you would choose.  Why are advance directives important?  An advance directive helps you control your care. Although spoken wishes may be used, it is better to have your wishes written down. Spoken wishes can be misunderstood, or not followed. Treatments may be  given even if you do not want them. An advance directive may make it easier for your family to make difficult choices about your care.   Urinary Incontinence   Urinary incontinence (UI)  is when you lose control of your bladder. UI develops because your bladder cannot store or empty urine properly. The 3 most common types of UI are stress incontinence, urge incontinence, or both.  Medicines:   May be given to help strengthen your bladder control. Report any side effects of medication to your healthcare provider.  Do pelvic muscle exercises often:  Your pelvic muscles help you stop urinating. Squeeze these muscles tight for 5 seconds, then relax for 5 seconds. Gradually work up to squeezing for 10 seconds. Do 3 sets of 15 repetitions a day, or as directed. This will help strengthen your pelvic muscles and improve bladder control.  Train your bladder:  Go to the bathroom at set times, such as every 2 hours, even if you do not feel the urge to go. You can also try to hold your urine when you feel the urge to go. For example, hold your urine for 5 minutes when you feel the urge to go. As that becomes easier, hold your urine for 10 minutes.   Self-care:   Keep a UI record.  Write down how often you leak urine and how much you leak. Make a note of what you were doing when you leaked urine.  Drink liquids as directed. You may need to limit the amount of liquid you drink to help control your urine leakage. Do not drink any liquid right before you go to bed. Limit or do not have drinks that contain caffeine or alcohol.   Prevent constipation.  Eat a variety of high-fiber foods. Good examples are high-fiber cereals, beans, vegetables, and whole-grain breads. Walking is the best way to trigger your intestines to have a bowel movement.  Exercise regularly and maintain a healthy weight.  Weight loss and exercise will decrease pressure on your bladder and help you control your leakage.   Use a catheter as directed  to help empty  your bladder. A catheter is a tiny, plastic tube that is put into your bladder to drain your urine.   Go to behavior therapy as directed.  Behavior therapy may be used to help you learn to control your urge to urinate.    Weight Management   Why it is important to manage your weight:  Being overweight increases your risk of health conditions such as heart disease, high blood pressure, type 2 diabetes, and certain types of cancer. It can also increase your risk for osteoarthritis, sleep apnea, and other respiratory problems. Aim for a slow, steady weight loss. Even a small amount of weight loss can lower your risk of health problems.  How to lose weight safely:  A safe and healthy way to lose weight is to eat fewer calories and get regular exercise. You can lose up about 1 pound a week by decreasing the number of calories you eat by 500 calories each day.   Healthy meal plan for weight management:  A healthy meal plan includes a variety of foods, contains fewer calories, and helps you stay healthy. A healthy meal plan includes the following:  Eat whole-grain foods more often.  A healthy meal plan should contain fiber. Fiber is the part of grains, fruits, and vegetables that is not broken down by your body. Whole-grain foods are healthy and provide extra fiber in your diet. Some examples of whole-grain foods are whole-wheat breads and pastas, oatmeal, brown rice, and bulgur.  Eat a variety of vegetables every day.  Include dark, leafy greens such as spinach, kale, zoila greens, and mustard greens. Eat yellow and orange vegetables such as carrots, sweet potatoes, and winter squash.   Eat a variety of fruits every day.  Choose fresh or canned fruit (canned in its own juice or light syrup) instead of juice. Fruit juice has very little or no fiber.  Eat low-fat dairy foods.  Drink fat-free (skim) milk or 1% milk. Eat fat-free yogurt and low-fat cottage cheese. Try low-fat cheeses such as mozzarella and other reduced-fat  cheeses.  Choose meat and other protein foods that are low in fat.  Choose beans or other legumes such as split peas or lentils. Choose fish, skinless poultry (chicken or turkey), or lean cuts of red meat (beef or pork). Before you cook meat or poultry, cut off any visible fat.   Use less fat and oil.  Try baking foods instead of frying them. Add less fat, such as margarine, sour cream, regular salad dressing and mayonnaise to foods. Eat fewer high-fat foods. Some examples of high-fat foods include french fries, doughnuts, ice cream, and cakes.  Eat fewer sweets.  Limit foods and drinks that are high in sugar. This includes candy, cookies, regular soda, and sweetened drinks.  Exercise:  Exercise at least 30 minutes per day on most days of the week. Some examples of exercise include walking, biking, dancing, and swimming. You can also fit in more physical activity by taking the stairs instead of the elevator or parking farther away from stores. Ask your healthcare provider about the best exercise plan for you.      © Copyright [employer] 2018 Information is for End User's use only and may not be sold, redistributed or otherwise used for commercial purposes. All illustrations and images included in CareNotes® are the copyrighted property of [employer] Inc[employer] or [employer]

## 2025-02-13 NOTE — ASSESSMENT & PLAN NOTE
-overdue for surveillance TSH  -continue on levothyroxine 112mcg daily for now  Orders:  •  TSH, 3rd generation with Free T4 reflex; Future  •  Comprehensive metabolic panel; Future

## 2025-02-18 ENCOUNTER — TELEPHONE (OUTPATIENT)
Dept: HEMATOLOGY ONCOLOGY | Facility: CLINIC | Age: 80
End: 2025-02-18

## 2025-02-18 NOTE — TELEPHONE ENCOUNTER
Left voicemail as a reminder to complete blood work prior to appointment with Clara on 2/24. Stated order is in the chart and is non-fasting. Stated the the lab work is ordered by Silvina Echols PA-C. Provided [employer] number for questions.

## 2025-02-21 ENCOUNTER — TELEPHONE (OUTPATIENT)
Dept: HEMATOLOGY ONCOLOGY | Facility: CLINIC | Age: 80
End: 2025-02-21

## 2025-02-23 ENCOUNTER — APPOINTMENT (OUTPATIENT)
Dept: LAB | Age: 80
End: 2025-02-23
Payer: COMMERCIAL

## 2025-02-23 DIAGNOSIS — D47.3 ESSENTIAL THROMBOCYTHEMIA (HCC): ICD-10-CM

## 2025-02-23 DIAGNOSIS — E03.9 ACQUIRED HYPOTHYROIDISM: ICD-10-CM

## 2025-02-23 LAB
ALBUMIN SERPL BCG-MCNC: 4.1 G/DL (ref 3.5–5)
ALP SERPL-CCNC: 101 U/L (ref 34–104)
ALT SERPL W P-5'-P-CCNC: 13 U/L (ref 7–52)
ANION GAP SERPL CALCULATED.3IONS-SCNC: 8 MMOL/L (ref 4–13)
AST SERPL W P-5'-P-CCNC: 15 U/L (ref 13–39)
BASOPHILS # BLD AUTO: 0.09 THOUSANDS/ÂΜL (ref 0–0.1)
BASOPHILS NFR BLD AUTO: 1 % (ref 0–1)
BILIRUB SERPL-MCNC: 0.56 MG/DL (ref 0.2–1)
BUN SERPL-MCNC: 11 MG/DL (ref 5–25)
CALCIUM SERPL-MCNC: 10 MG/DL (ref 8.4–10.2)
CHLORIDE SERPL-SCNC: 104 MMOL/L (ref 96–108)
CO2 SERPL-SCNC: 25 MMOL/L (ref 21–32)
CREAT SERPL-MCNC: 0.8 MG/DL (ref 0.6–1.3)
EOSINOPHIL # BLD AUTO: 0.19 THOUSAND/ÂΜL (ref 0–0.61)
EOSINOPHIL NFR BLD AUTO: 2 % (ref 0–6)
ERYTHROCYTE [DISTWIDTH] IN BLOOD BY AUTOMATED COUNT: 13.5 % (ref 11.6–15.1)
GFR SERPL CREATININE-BSD FRML MDRD: 71 ML/MIN/1.73SQ M
GLUCOSE SERPL-MCNC: 108 MG/DL (ref 65–140)
HCT VFR BLD AUTO: 45.4 % (ref 34.8–46.1)
HGB BLD-MCNC: 14.7 G/DL (ref 11.5–15.4)
IMM GRANULOCYTES # BLD AUTO: 0.05 THOUSAND/UL (ref 0–0.2)
IMM GRANULOCYTES NFR BLD AUTO: 1 % (ref 0–2)
LYMPHOCYTES # BLD AUTO: 1.78 THOUSANDS/ÂΜL (ref 0.6–4.47)
LYMPHOCYTES NFR BLD AUTO: 18 % (ref 14–44)
MCH RBC QN AUTO: 33.4 PG (ref 26.8–34.3)
MCHC RBC AUTO-ENTMCNC: 32.4 G/DL (ref 31.4–37.4)
MCV RBC AUTO: 103 FL (ref 82–98)
MONOCYTES # BLD AUTO: 0.73 THOUSAND/ÂΜL (ref 0.17–1.22)
MONOCYTES NFR BLD AUTO: 7 % (ref 4–12)
NEUTROPHILS # BLD AUTO: 7.14 THOUSANDS/ÂΜL (ref 1.85–7.62)
NEUTS SEG NFR BLD AUTO: 71 % (ref 43–75)
NRBC BLD AUTO-RTO: 0 /100 WBCS
PLATELET # BLD AUTO: 629 THOUSANDS/UL (ref 149–390)
PMV BLD AUTO: 9.6 FL (ref 8.9–12.7)
POTASSIUM SERPL-SCNC: 4.3 MMOL/L (ref 3.5–5.3)
PROT SERPL-MCNC: 7 G/DL (ref 6.4–8.4)
RBC # BLD AUTO: 4.4 MILLION/UL (ref 3.81–5.12)
SODIUM SERPL-SCNC: 137 MMOL/L (ref 135–147)
TSH SERPL DL<=0.05 MIU/L-ACNC: 1.84 UIU/ML (ref 0.45–4.5)
WBC # BLD AUTO: 9.98 THOUSAND/UL (ref 4.31–10.16)

## 2025-02-23 PROCEDURE — 80053 COMPREHEN METABOLIC PANEL: CPT

## 2025-02-23 PROCEDURE — 36415 COLL VENOUS BLD VENIPUNCTURE: CPT

## 2025-02-23 PROCEDURE — 84443 ASSAY THYROID STIM HORMONE: CPT

## 2025-02-23 PROCEDURE — 85025 COMPLETE CBC W/AUTO DIFF WBC: CPT

## 2025-02-24 ENCOUNTER — OFFICE VISIT (OUTPATIENT)
Dept: HEMATOLOGY ONCOLOGY | Facility: CLINIC | Age: 80
End: 2025-02-24
Payer: COMMERCIAL

## 2025-02-24 VITALS
TEMPERATURE: 98 F | RESPIRATION RATE: 17 BRPM | WEIGHT: 182 LBS | HEIGHT: 62 IN | DIASTOLIC BLOOD PRESSURE: 86 MMHG | SYSTOLIC BLOOD PRESSURE: 136 MMHG | OXYGEN SATURATION: 96 % | HEART RATE: 84 BPM | BODY MASS INDEX: 33.49 KG/M2

## 2025-02-24 DIAGNOSIS — D47.3 ESSENTIAL THROMBOCYTHEMIA (HCC): Primary | ICD-10-CM

## 2025-02-24 DIAGNOSIS — D47.3 ESSENTIAL THROMBOCYTHEMIA (HCC): ICD-10-CM

## 2025-02-24 PROCEDURE — 99214 OFFICE O/P EST MOD 30 MIN: CPT

## 2025-02-24 RX ORDER — HYDROXYUREA 500 MG/1
500 CAPSULE ORAL DAILY
Qty: 90 CAPSULE | Refills: 2 | Status: SHIPPED | OUTPATIENT
Start: 2025-02-24

## 2025-02-24 RX ORDER — HYDROXYUREA 500 MG/1
CAPSULE ORAL
Qty: 45 CAPSULE | Refills: 4 | Status: SHIPPED | OUTPATIENT
Start: 2025-02-24 | End: 2025-02-24

## 2025-02-24 RX ORDER — FOLIC ACID 1 MG/1
1 TABLET ORAL DAILY
Qty: 90 TABLET | Refills: 2 | Status: SHIPPED | OUTPATIENT
Start: 2025-02-24

## 2025-02-24 NOTE — ASSESSMENT & PLAN NOTE
Orders:    hydroxyurea (HYDREA) 500 mg capsule; TAKE 1 CAPSULE BY MOUTH DAILY    folic acid (FOLVITE) 1 mg tablet; Take 1 tablet (1 mg total) by mouth daily    US abdomen complete; Future    CBC and differential; Future

## 2025-02-24 NOTE — PROGRESS NOTES
Name: Cierra Santiago      : 1945      MRN: [id number]  Encounter Provider: LUIZA Chapin  Encounter Date: 2025   Encounter department: [facility] [place]  :  Assessment & Plan  Essential thrombocythemia (HCC)    Orders:    hydroxyurea (HYDREA) 500 mg capsule; TAKE 1 CAPSULE BY MOUTH DAILY    folic acid (FOLVITE) 1 mg tablet; Take 1 tablet (1 mg total) by mouth daily    US abdomen complete; Future    CBC and differential; Future    79-year-old female with essential thrombocytosis, JAK2 V6 17F mutation.  We discussed the goal of treatment is to prevent any thrombotic or hemorrhagic complications and alleviate symptoms such as headaches, dizziness, visual disturbances, burning dysesthesias. We discussed she has high risk disease on risk stratification scale as she is greater than 60 years old.    We discussed that her platelet count is above 600,000 and our goal is to keep her platelets below 400,000.  Recommend she increase her Hydrea dose to 500 mg daily instead of every other day.  We will also start her on folic acid 1 mg daily.  Patient will continue ASA 81 mg daily.  Will repeat labs in 2 weeks and I will give her a call with the results.    Since her last ultrasound was in 2017, we will reassess her spleen by obtaining an abdominal ultrasound.      Will see patient back in the office in 6 months with Dr. Urbina.  Patient is agreeable with this plan.  Patient has a prescription for a mammogram, for which she will call and schedule the study.  She is aware to contact us for any additional questions/concerns or worsening symptoms.    Return in about 6 months (around 2025) for F/U with Dr. Urbina, Schedule Ultrasound.    History of Present Illness   Chief Complaint   Patient presents with    Follow-up   Cierra Santiago is a 79-year-old with essential thrombocytosis, JAK2 V6 17F mutation.  She is a transfer of care from Dr. Mckeon's office.  Patient has PMH  significant for hypertension, CAD, hypothyroidism, osteopenia.      Patient was started on Hydrea 500 mg every other day on 2/4/2020.  She is also taking ASA 81 mg daily.  An abdominal ultrasound obtained 8/2017 showed a fatty liver and spleen was unremarkable.    Patient is doing well overall.  She has intermittent pruritus on her forearms.  Patient moisturizes her skin after shower.  Denies any increased fatigue, fevers, infections, decreased appetite or unintentional weight loss.  Denies any headaches, blurry vision, erythromelalgia.  Denies any abdominal pain, constipation or diarrhea.  No rashes.    Patient is not up-to-date on her yearly mammogram.    Component      Latest Ref Rng 1/31/2022 2/6/2022 7/31/2022 7/25/2023 8/12/2024 2/23/2025   Platelet Count      149 - 390 Thousands/uL 415 (H)  848 (H)  505 (H)  462 (H)  523 (H)  629 (H)           Oncology History   Cancer Staging   No matching staging information was found for the patient.  Oncology History Overview Note   Essential Thrombocytosis:    8/16/2017: JAK2 V617F mutation   8/2017 - ASA 81 mg daily  2/4/2020: Hydrea 500 mg every other day  2/24/2025: Hydrea 500 mg daily     Essential thrombocythemia (HCC)   8/23/2017 Initial Diagnosis    Essential thrombocythemia (HCC)        Pertinent Medical History     02/24/25: Cierra Santiago is a 79-year-old female with essential thrombocytosis with JAK2 V6 17F mutation.  Patient has been following with our oncology practice since 2017 with Dr. Mckeon/Silvina Echols PA-C.  Patient was diagnosed with essential thrombocytosis in August 2017.  She was started on Hydrea 2/4/2020.  Patient has been taking it every other day.  Her ultrasound obtained in August 2017 was unremarkable.     Review of Systems   Constitutional:  Negative for activity change, appetite change, diaphoresis, fatigue, fever and unexpected weight change.   HENT:  Negative for nosebleeds.    Eyes: Negative.    Respiratory:  Negative for cough,  "chest tightness and shortness of breath.    Cardiovascular:  Negative for chest pain, palpitations and leg swelling.   Gastrointestinal:  Negative for abdominal pain and blood in stool.   Endocrine: Negative.    Genitourinary:  Negative for hematuria.   Musculoskeletal: Negative.    Skin: Negative.    Neurological:  Negative for dizziness, light-headedness and headaches.   Hematological: Negative.            Objective   /86 (BP Location: Left arm, Patient Position: Sitting, Cuff Size: Adult)   Pulse 84   Temp 98 °F (36.7 °C) (Temporal)   Resp 17   Ht 5' 2\" (1.575 m)   Wt 82.6 kg (182 lb)   SpO2 96%   BMI 33.29 kg/m²     Pain Screening:  Pain Score: 0-No pain  ECOG   0  Physical Exam  Constitutional:       Appearance: Normal appearance.   HENT:      Head: Normocephalic and atraumatic.   Cardiovascular:      Rate and Rhythm: Regular rhythm.      Heart sounds: Normal heart sounds.   Pulmonary:      Breath sounds: Normal breath sounds.   Musculoskeletal:      Cervical back: Normal range of motion.      Right lower leg: No edema.      Left lower leg: No edema.   Skin:     General: Skin is warm and dry.   Neurological:      Mental Status: She is alert and oriented to person, place, and time.   Psychiatric:         Mood and Affect: Mood normal.         Behavior: Behavior normal.         Thought Content: Thought content normal.         Judgment: Judgment normal.         Labs: I have reviewed the following labs:  Lab Results   Component Value Date/Time    WBC 9.98 02/23/2025 09:57 AM    RBC 4.40 02/23/2025 09:57 AM    Hemoglobin 14.7 02/23/2025 09:57 AM    Hematocrit 45.4 02/23/2025 09:57 AM     (H) 02/23/2025 09:57 AM    MCH 33.4 02/23/2025 09:57 AM    RDW 13.5 02/23/2025 09:57 AM    Platelets 629 (H) 02/23/2025 09:57 AM    Segmented % 71 02/23/2025 09:57 AM    Lymphocytes % 18 02/23/2025 09:57 AM    Monocytes % 7 02/23/2025 09:57 AM    Eosinophils Relative 2 02/23/2025 09:57 AM    Basophils Relative 1 " 02/23/2025 09:57 AM    Immature Grans % 1 02/23/2025 09:57 AM    Absolute Neutrophils 7.14 02/23/2025 09:57 AM     Lab Results   Component Value Date/Time    Potassium 4.3 02/23/2025 09:57 AM    Chloride 104 02/23/2025 09:57 AM    CO2 25 02/23/2025 09:57 AM    BUN 11 02/23/2025 09:57 AM    Creatinine 0.80 02/23/2025 09:57 AM    Calcium 10.0 02/23/2025 09:57 AM    AST 15 02/23/2025 09:57 AM    ALT 13 02/23/2025 09:57 AM    Alkaline Phosphatase 101 02/23/2025 09:57 AM    Total Protein 7.0 02/23/2025 09:57 AM    Albumin 4.1 02/23/2025 09:57 AM    Total Bilirubin 0.56 02/23/2025 09:57 AM    eGFR 71 02/23/2025 09:57 AM   I spent  30 minutes in chart review, counseling, coordination of care, and documentation.    This note has been generated by voice recognition software system.  Therefore, there may be spelling, grammar, and or syntax errors. Please contact if questions arise.

## 2025-03-10 ENCOUNTER — TELEPHONE (OUTPATIENT)
Dept: HEMATOLOGY ONCOLOGY | Facility: CLINIC | Age: 80
End: 2025-03-10

## 2025-03-10 ENCOUNTER — APPOINTMENT (OUTPATIENT)
Dept: LAB | Age: 80
End: 2025-03-10
Payer: COMMERCIAL

## 2025-03-10 ENCOUNTER — HOSPITAL ENCOUNTER (OUTPATIENT)
Dept: RADIOLOGY | Age: 80
Discharge: HOME | End: 2025-03-10
Payer: COMMERCIAL

## 2025-03-10 DIAGNOSIS — D47.3 ESSENTIAL THROMBOCYTHEMIA (HCC): Primary | ICD-10-CM

## 2025-03-10 DIAGNOSIS — D47.3 ESSENTIAL THROMBOCYTHEMIA (HCC): ICD-10-CM

## 2025-03-10 LAB
BASOPHILS # BLD AUTO: 0.09 THOUSANDS/ÂΜL (ref 0–0.1)
BASOPHILS NFR BLD AUTO: 1 % (ref 0–1)
EOSINOPHIL # BLD AUTO: 0.18 THOUSAND/ÂΜL (ref 0–0.61)
EOSINOPHIL NFR BLD AUTO: 2 % (ref 0–6)
ERYTHROCYTE [DISTWIDTH] IN BLOOD BY AUTOMATED COUNT: 14 % (ref 11.6–15.1)
HCT VFR BLD AUTO: 44.1 % (ref 34.8–46.1)
HGB BLD-MCNC: 14.4 G/DL (ref 11.5–15.4)
IMM GRANULOCYTES # BLD AUTO: 0.03 THOUSAND/UL (ref 0–0.2)
IMM GRANULOCYTES NFR BLD AUTO: 0 % (ref 0–2)
LYMPHOCYTES # BLD AUTO: 1.33 THOUSANDS/ÂΜL (ref 0.6–4.47)
LYMPHOCYTES NFR BLD AUTO: 14 % (ref 14–44)
MCH RBC QN AUTO: 33.2 PG (ref 26.8–34.3)
MCHC RBC AUTO-ENTMCNC: 32.7 G/DL (ref 31.4–37.4)
MCV RBC AUTO: 102 FL (ref 82–98)
MONOCYTES # BLD AUTO: 0.65 THOUSAND/ÂΜL (ref 0.17–1.22)
MONOCYTES NFR BLD AUTO: 7 % (ref 4–12)
NEUTROPHILS # BLD AUTO: 7.27 THOUSANDS/ÂΜL (ref 1.85–7.62)
NEUTS SEG NFR BLD AUTO: 76 % (ref 43–75)
NRBC BLD AUTO-RTO: 0 /100 WBCS
PLATELET # BLD AUTO: 550 THOUSANDS/UL (ref 149–390)
PMV BLD AUTO: 9.5 FL (ref 8.9–12.7)
RBC # BLD AUTO: 4.34 MILLION/UL (ref 3.81–5.12)
WBC # BLD AUTO: 9.55 THOUSAND/UL (ref 4.31–10.16)

## 2025-03-10 PROCEDURE — 85025 COMPLETE CBC W/AUTO DIFF WBC: CPT

## 2025-03-10 PROCEDURE — 76700 US EXAM ABDOM COMPLETE: CPT

## 2025-03-10 PROCEDURE — 36415 COLL VENOUS BLD VENIPUNCTURE: CPT

## 2025-03-10 NOTE — TELEPHONE ENCOUNTER
Spoke with the plan, she has been taking Hydrea 500 mg daily.  We reviewed that her platelet count did come down and is now 550,000.  The goal is to keep it below 400,000.  Therefore, we will make another dose change.  She will continue taking Hydrea 500 mg Monday through Friday and take Hydrea 1000 mg Saturday and Sunday.  She will repeat her blood work the week of 3/31/2025.  Patient voiced understanding is in agreeable with the plan.

## 2025-03-18 ENCOUNTER — TELEPHONE (OUTPATIENT)
Dept: HEMATOLOGY ONCOLOGY | Facility: CLINIC | Age: 80
End: 2025-03-18

## 2025-03-18 DIAGNOSIS — N28.1 RENAL CYST: Primary | ICD-10-CM

## 2025-03-18 NOTE — TELEPHONE ENCOUNTER
Spoke with patient to review her ultrasound however, she was at [facility] and requested for me to call back.  Informed her I will call her back this afternoon.

## 2025-03-18 NOTE — TELEPHONE ENCOUNTER
Spoke with patient to review her abdominal ultrasound.  Her spleen is within the normal limits however, there was a complex cyst in the right kidney measuring 1.7 cm.  Recommend patient see nephrology.  Her niece just passed away therefore, patient would like to hold off.  This is an advised patient to make the appointment when she is up to it.  Also provided emotional support and encourage patient to call should she need anything at all.  Patient voiced understanding.

## 2025-03-22 DIAGNOSIS — I21.3 STEMI (ST ELEVATION MYOCARDIAL INFARCTION) (HCC): ICD-10-CM

## 2025-03-22 DIAGNOSIS — I21.11 STEMI INVOLVING RIGHT CORONARY ARTERY (HCC): ICD-10-CM

## 2025-03-24 RX ORDER — CLOPIDOGREL BISULFATE 75 MG/1
75 TABLET ORAL DAILY
Qty: 90 TABLET | Refills: 0 | Status: SHIPPED | OUTPATIENT
Start: 2025-03-24

## 2025-03-24 RX ORDER — ATORVASTATIN CALCIUM 40 MG/1
40 TABLET, FILM COATED ORAL DAILY
Qty: 90 TABLET | Refills: 1 | Status: SHIPPED | OUTPATIENT
Start: 2025-03-24

## 2025-04-06 ENCOUNTER — APPOINTMENT (OUTPATIENT)
Dept: LAB | Age: 80
End: 2025-04-06
Payer: COMMERCIAL

## 2025-04-06 DIAGNOSIS — D47.3 ESSENTIAL THROMBOCYTHEMIA (HCC): ICD-10-CM

## 2025-04-06 LAB
BASOPHILS # BLD AUTO: 0.07 THOUSANDS/ÂΜL (ref 0–0.1)
BASOPHILS NFR BLD AUTO: 1 % (ref 0–1)
EOSINOPHIL # BLD AUTO: 0.17 THOUSAND/ÂΜL (ref 0–0.61)
EOSINOPHIL NFR BLD AUTO: 2 % (ref 0–6)
ERYTHROCYTE [DISTWIDTH] IN BLOOD BY AUTOMATED COUNT: 15.5 % (ref 11.6–15.1)
HCT VFR BLD AUTO: 44.3 % (ref 34.8–46.1)
HGB BLD-MCNC: 14.5 G/DL (ref 11.5–15.4)
IMM GRANULOCYTES # BLD AUTO: 0.02 THOUSAND/UL (ref 0–0.2)
IMM GRANULOCYTES NFR BLD AUTO: 0 % (ref 0–2)
LYMPHOCYTES # BLD AUTO: 1.8 THOUSANDS/ÂΜL (ref 0.6–4.47)
LYMPHOCYTES NFR BLD AUTO: 23 % (ref 14–44)
MCH RBC QN AUTO: 34.3 PG (ref 26.8–34.3)
MCHC RBC AUTO-ENTMCNC: 32.7 G/DL (ref 31.4–37.4)
MCV RBC AUTO: 105 FL (ref 82–98)
MONOCYTES # BLD AUTO: 0.55 THOUSAND/ÂΜL (ref 0.17–1.22)
MONOCYTES NFR BLD AUTO: 7 % (ref 4–12)
NEUTROPHILS # BLD AUTO: 5.14 THOUSANDS/ÂΜL (ref 1.85–7.62)
NEUTS SEG NFR BLD AUTO: 67 % (ref 43–75)
NRBC BLD AUTO-RTO: 0 /100 WBCS
PLATELET # BLD AUTO: 436 THOUSANDS/UL (ref 149–390)
PMV BLD AUTO: 8.9 FL (ref 8.9–12.7)
RBC # BLD AUTO: 4.23 MILLION/UL (ref 3.81–5.12)
WBC # BLD AUTO: 7.75 THOUSAND/UL (ref 4.31–10.16)

## 2025-04-06 PROCEDURE — 85025 COMPLETE CBC W/AUTO DIFF WBC: CPT

## 2025-04-06 PROCEDURE — 36415 COLL VENOUS BLD VENIPUNCTURE: CPT

## 2025-04-07 ENCOUNTER — TELEPHONE (OUTPATIENT)
Dept: HEMATOLOGY ONCOLOGY | Facility: CLINIC | Age: 80
End: 2025-04-07

## 2025-04-07 DIAGNOSIS — D47.3 ESSENTIAL THROMBOCYTHEMIA (HCC): Primary | ICD-10-CM

## 2025-04-07 NOTE — TELEPHONE ENCOUNTER
Spoke with Cierra, her platelet count is 436,000.  She is taking Hydrea 500 mg daily, she is tolerating it well.  At this time, her platelet count is moving in the right direction therefore, recommend she continue taking Hydrea 500 mg daily.  We will recheck her counts in a month, first week in May and I will call her with the results.  She voiced understanding.

## 2025-04-16 DIAGNOSIS — E03.9 ACQUIRED HYPOTHYROIDISM: ICD-10-CM

## 2025-04-16 RX ORDER — LEVOTHYROXINE SODIUM 112 UG/1
112 TABLET ORAL DAILY
Qty: 30 TABLET | Refills: 5 | Status: SHIPPED | OUTPATIENT
Start: 2025-04-16

## 2025-04-18 DIAGNOSIS — I10 BENIGN HYPERTENSION: ICD-10-CM

## 2025-04-18 DIAGNOSIS — I10 BENIGN ESSENTIAL HYPERTENSION: ICD-10-CM

## 2025-04-18 RX ORDER — AMLODIPINE BESYLATE 5 MG/1
10 TABLET ORAL DAILY
Qty: 180 TABLET | Refills: 1 | Status: SHIPPED | OUTPATIENT
Start: 2025-04-18

## 2025-05-04 DIAGNOSIS — I10 BENIGN ESSENTIAL HYPERTENSION: ICD-10-CM

## 2025-05-05 RX ORDER — LISINOPRIL 40 MG/1
40 TABLET ORAL DAILY
Qty: 100 TABLET | Refills: 1 | Status: SHIPPED | OUTPATIENT
Start: 2025-05-05

## 2025-05-07 DIAGNOSIS — I21.3 STEMI (ST ELEVATION MYOCARDIAL INFARCTION) (HCC): ICD-10-CM

## 2025-05-07 RX ORDER — METOPROLOL TARTRATE 25 MG/1
25 TABLET, FILM COATED ORAL 2 TIMES DAILY
Qty: 180 TABLET | Refills: 1 | Status: SHIPPED | OUTPATIENT
Start: 2025-05-07

## 2025-06-09 NOTE — PROGRESS NOTES
Cardiology Follow Up    Cierra Santiago  1945  [id number]  [facility]  [address] [place] [address] [phone]    Assessment & Plan  Other fatigue  Possibly due to low BP taking all her medications at once in the am  BP in the office RUE 90/60  Instructed to decrease amlodipine from 10 mg daily to 5 mg daily and take at lunchtime.  Continue metoprolol tartrate 25 mg twice daily.  Take lisinopril 40 mg daily at bedtime.  Hydrate   Cierra denies CP or SOB.  I have offered a NM Stress test R/O ischemia contributing to fatigue.  Cierra is refusing at this time   Benign essential hypertension  RUE sitting 90/60  Decrease Amlodipine from 10mg daily to 5mg daily   Continue metoprolol tartrate 25 mg twice daily.  Lisinopril 40 mg daily  DASH diet   Home blood pressure monitoring as instrcuted.  Goal -130   Presence of drug-eluting stent in right coronary artery  1/2022 IW STEMI, 1/28/22 sp HANH to RCA  Denies CP  Continue on Toprol tartrate 25 mg every 12 hours, lisinopril 40 mg daily, Plavix 75 mg daily, aspirin 81 mg daily, Lipitor 40 mg daily, decrease amlodipine from 10 mg daily to 5 mg daily.  Heart healthy diet   Daily walking   Obesity (BMI 30-39.9)  BMI 32.54, actively working on weight loss with diet.  Cierra has cut out snaking         Interval History:   Ms Cierra Santiago presents to our office for a follow up visit.  Her chief complaint is a 2 months history of severe fatigue.  According to Cierra she was placed on Folic Acid by her PCP 2 months ago and she started to experience increased fatigue.  She is able to perform ADL's.  According to Cierra at 11 am in the morning she is falling asleep.  Cierra is taking all her medications at once in the morning except Levothyroxine.  She may not be hydrating with enough water.  Cierra denies CP, shortness of breath lightheadedness or dizziness.      Medical History    Primary Cardiologist Dr Azul  2022 IW STEMI, 22 sp HANH to RCA   Hypertension  Hyperlipidemia 25 , , HDL 54, LDL 67    Problem List[1]  Past Medical History[2]  Social History     Socioeconomic History    Marital status:      Spouse name: Not on file    Number of children: Not on file    Years of education: Not on file    Highest education level: Not on file   Occupational History    Occupation: Retired   Tobacco Use    Smoking status: Former     Current packs/day: 0.00     Average packs/day: 0.5 packs/day for 40.0 years (20.0 ttl pk-yrs)     Types: Cigarettes     Start date: 1972     Quit date: 2012     Years since quittin.4    Smokeless tobacco: Never   Vaping Use    Vaping status: Never Used   Substance and Sexual Activity    Alcohol use: No    Drug use: No    Sexual activity: Never   Other Topics Concern    Not on file   Social History Narrative    Not on file     Social Drivers of Health     Financial Resource Strain: Low Risk  (2023)    Overall Financial Resource Strain (CARDIA)     Difficulty of Paying Living Expenses: Not hard at all   Food Insecurity: No Food Insecurity (2025)    Nursing - Inadequate Food Risk Classification     Worried About Running Out of Food in the Last Year: Never true     Ran Out of Food in the Last Year: Never true     Ran Out of Food in the Last Year: Not on file   Transportation Needs: No Transportation Needs (2025)    PRAPARE - Transportation     Lack of Transportation (Medical): No     Lack of Transportation (Non-Medical): No   Physical Activity: Not on file   Stress: Not on file   Social Connections: Not on file   Intimate Partner Violence: Not on file   Housing Stability: Low Risk  (2025)    Housing Stability Vital Sign     Unable to Pay for Housing in the Last Year: No     Number of Times Moved in the Last Year: 0     Homeless in the Last Year: No      Family History[3]  Past Surgical History[4]  Current  Medications[5]  No Known Allergies    Labs:  No visits with results within 2 Month(s) from this visit.   Latest known visit with results is:   Appointment on 04/06/2025   Component Date Value    WBC 04/06/2025 7.75     RBC 04/06/2025 4.23     Hemoglobin 04/06/2025 14.5     Hematocrit 04/06/2025 44.3     MCV 04/06/2025 105 (H)     MCH 04/06/2025 34.3     MCHC 04/06/2025 32.7     RDW 04/06/2025 15.5 (H)     MPV 04/06/2025 8.9     Platelets 04/06/2025 436 (H)     nRBC 04/06/2025 0     Segmented % 04/06/2025 67     Immature Grans % 04/06/2025 0     Lymphocytes % 04/06/2025 23     Monocytes % 04/06/2025 7     Eosinophils Relative 04/06/2025 2     Basophils Relative 04/06/2025 1     Absolute Neutrophils 04/06/2025 5.14     Absolute Immature Grans 04/06/2025 0.02     Absolute Lymphocytes 04/06/2025 1.80     Absolute Monocytes 04/06/2025 0.55     Eosinophils Absolute 04/06/2025 0.17     Basophils Absolute 04/06/2025 0.07      Imaging: No results found.    Review of Systems:  Review of Systems   Constitutional:  Positive for fatigue.   Musculoskeletal:  Positive for arthralgias and myalgias.   All other systems reviewed and are negative.      Physical Exam:  Physical Exam  Vitals reviewed.   Constitutional:       Appearance: Normal appearance.   HENT:      Head: Normocephalic.     Cardiovascular:      Rate and Rhythm: Normal rate and regular rhythm.      Pulses: Normal pulses.      Heart sounds: Normal heart sounds.   Pulmonary:      Effort: Pulmonary effort is normal.      Breath sounds: Normal breath sounds.     Musculoskeletal:         General: Normal range of motion.      Cervical back: Normal range of motion and neck supple.      Right lower leg: No edema.      Left lower leg: No edema.     Skin:     General: Skin is warm and dry.      Capillary Refill: Capillary refill takes less than 2 seconds.     Neurological:      General: No focal deficit present.      Mental Status: She is alert and oriented to person, place, and  time.     Psychiatric:         Mood and Affect: Mood normal.         Behavior: Behavior normal.              [1]   Patient Active Problem List  Diagnosis    Benign essential hypertension    Essential thrombocythemia (HCC)    Fatty liver    Hypothyroidism    IFG (impaired fasting glucose)    Insomnia    Osteopenia    Presence of drug-eluting stent in right coronary artery    Vertigo, benign paroxysmal, left    Atherosclerosis of native coronary artery of native heart with angina pectoris (HCC)    Mixed stress and urge urinary incontinence    Chronic right-sided low back pain without sciatica   [2]   Past Medical History:  Diagnosis Date    Biliary colic     last assessed: 01/05/16    Cholecystitis     Disease of thyroid gland     hypo    Hematuria     last assessed: 05/05/17    Hypercalcemia 12/30/2019    Hypertension     Hypokalemia 01/22/2016    Impaired fasting glucose 07/09/2013    Leukocytosis     last assessed: 04/05/16    Obesity (BMI 30.0-34.9) 06/21/2019    Prediabetes 11/10/2016    STEMI involving right coronary artery (HCC) 01/28/2022    Toxic nodular goiter     Uterine fibroid    [3]   Family History  Problem Relation Name Age of Onset    Hypothyroidism Mother      Heart disease Brother          Cardiac disorder   [4]   Past Surgical History:  Procedure Laterality Date    CARDIAC CATHETERIZATION N/A 1/28/2022    Procedure: Cardiac pci;  Surgeon: Noam Anaya MD;  Location: [facility];  Service: Cardiology    HYSTERECTOMY      INGRIS, with removal of both ovaries    DE LAPAROSCOPY SURG CHOLECYSTECTOMY N/A 3/9/2016    Procedure: LAPAROSCOPIC CHOLECYSTECTOMY ;  Surgeon: Blake Gonzalez MD;  Location: [facility];  Service: General    TUBAL LIGATION     [5]   Current Outpatient Medications:     amLODIPine (NORVASC) 5 mg tablet, Take 2 tablets by mouth once daily, Disp: 180 tablet, Rfl: 1    aspirin 81 MG tablet, Take 81 mg by mouth daily., Disp: , Rfl:     atorvastatin (LIPITOR) 40 mg tablet, Take 1  tablet by mouth once daily, Disp: 90 tablet, Rfl: 1    clopidogrel (PLAVIX) 75 mg tablet, Take 1 tablet by mouth once daily, Disp: 90 tablet, Rfl: 0    folic acid (FOLVITE) 1 mg tablet, Take 1 tablet (1 mg total) by mouth daily, Disp: 90 tablet, Rfl: 2    hydroxyurea (HYDREA) 500 mg capsule, TAKE 1 CAPSULE BY MOUTH ONCE DAILY, Disp: 90 capsule, Rfl: 2    levothyroxine 112 mcg tablet, Take 1 tablet by mouth once daily, Disp: 30 tablet, Rfl: 5    lisinopril (ZESTRIL) 40 mg tablet, Take 1 tablet by mouth once daily, Disp: 100 tablet, Rfl: 1    metoprolol tartrate (LOPRESSOR) 25 mg tablet, TAKE 1 TABLET BY MOUTH EVERY 12 HOURS, Disp: 180 tablet, Rfl: 1    nitroglycerin (NITROSTAT) 0.4 mg SL tablet, Place 1 tablet (0.4 mg total) under the tongue every 5 (five) minutes as needed for chest pain (Patient not taking: Reported on 2/13/2025), Disp: 30 tablet, Rfl: 3

## 2025-06-10 ENCOUNTER — OFFICE VISIT (OUTPATIENT)
Dept: CARDIOLOGY CLINIC | Facility: CLINIC | Age: 80
End: 2025-06-10
Payer: COMMERCIAL

## 2025-06-10 VITALS
HEIGHT: 62 IN | DIASTOLIC BLOOD PRESSURE: 62 MMHG | SYSTOLIC BLOOD PRESSURE: 110 MMHG | WEIGHT: 177.9 LBS | OXYGEN SATURATION: 96 % | BODY MASS INDEX: 32.74 KG/M2 | HEART RATE: 67 BPM

## 2025-06-10 DIAGNOSIS — I10 BENIGN ESSENTIAL HYPERTENSION: ICD-10-CM

## 2025-06-10 DIAGNOSIS — E66.9 OBESITY (BMI 30-39.9): ICD-10-CM

## 2025-06-10 DIAGNOSIS — R53.83 OTHER FATIGUE: Primary | ICD-10-CM

## 2025-06-10 DIAGNOSIS — Z95.5 PRESENCE OF DRUG-ELUTING STENT IN RIGHT CORONARY ARTERY: ICD-10-CM

## 2025-06-10 PROCEDURE — 99214 OFFICE O/P EST MOD 30 MIN: CPT | Performed by: NURSE PRACTITIONER

## 2025-06-10 RX ORDER — AMLODIPINE BESYLATE 5 MG/1
5 TABLET ORAL DAILY
Qty: 180 TABLET | Refills: 1 | Status: SHIPPED | OUTPATIENT
Start: 2025-06-10

## 2025-06-10 NOTE — ASSESSMENT & PLAN NOTE
RUE sitting 90/60  Decrease Amlodipine from 10mg daily to 5mg daily   Continue metoprolol tartrate 25 mg twice daily.  Lisinopril 40 mg daily  DASH diet   Home blood pressure monitoring as instrcuted.  Goal -130

## 2025-06-10 NOTE — ASSESSMENT & PLAN NOTE
1/2022 IW STEMI, 1/28/22 sp HANH to RCA  Denies CP  Continue on Toprol tartrate 25 mg every 12 hours, lisinopril 40 mg daily, Plavix 75 mg daily, aspirin 81 mg daily, Lipitor 40 mg daily, decrease amlodipine from 10 mg daily to 5 mg daily.  Heart healthy diet   Daily walking

## 2025-06-10 NOTE — PATIENT INSTRUCTIONS
Decrease Amlodipine to 5mg daily at lunchtime   Lisinopril at bedtime   Metoprolol in am and pm     Daily home BP monitoring, goal -130   Sit for 10 min one hour before taking BP

## 2025-06-19 DIAGNOSIS — I21.11 STEMI INVOLVING RIGHT CORONARY ARTERY (HCC): ICD-10-CM

## 2025-06-19 RX ORDER — CLOPIDOGREL BISULFATE 75 MG/1
75 TABLET ORAL DAILY
Qty: 90 TABLET | Refills: 0 | Status: SHIPPED | OUTPATIENT
Start: 2025-06-19

## 2025-08-18 ENCOUNTER — TELEPHONE (OUTPATIENT)
Age: 80
End: 2025-08-18

## 2025-08-21 ENCOUNTER — NURSE TRIAGE (OUTPATIENT)
Age: 80
End: 2025-08-21

## (undated) DEVICE — RUNTHROUGH NS EXTRA FLOPPY PTCA GUIDEWIRE: Brand: RUNTHROUGH

## (undated) DEVICE — TR BAND RADIAL ARTERY COMPRESSION DEVICE: Brand: TR BAND

## (undated) DEVICE — DGW .035 FC J3MM 260CM TEF: Brand: EMERALD

## (undated) DEVICE — GUIDEWIRE WHOLEY HI TORQUE INTERM MOD J .035 145CM

## (undated) DEVICE — GLIDESHEATH SLENDER STAINLESS STEEL KIT: Brand: GLIDESHEATH SLENDER

## (undated) DEVICE — TREK CORONARY DILATATION CATHETER 2.50 MM X 15 MM / RAPID-EXCHANGE: Brand: TREK

## (undated) DEVICE — CATH THROMBECTOMY EXPORT 6FR 140CM

## (undated) DEVICE — NC TREK CORONARY DILATATION CATHETER 4.0 MM X 15 MM / RAPID-EXCHANGE: Brand: NC TREK

## (undated) DEVICE — CATH DIAG 5FR IMPULSE 100CM FL4

## (undated) DEVICE — CATH DIAG 6FR IMPULSE 100CM FR4